# Patient Record
Sex: MALE | Race: WHITE | NOT HISPANIC OR LATINO | Employment: OTHER | URBAN - METROPOLITAN AREA
[De-identification: names, ages, dates, MRNs, and addresses within clinical notes are randomized per-mention and may not be internally consistent; named-entity substitution may affect disease eponyms.]

---
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
## 2012-07-31 LAB — CRC RECOMMENDATION EXT: NORMAL

## 2017-04-07 RX ORDER — PRAVASTATIN SODIUM 20 MG/1
20 TABLET ORAL DAILY
COMMUNITY
End: 2021-01-26

## 2017-04-07 RX ORDER — GABAPENTIN 300 MG/1
300 CAPSULE ORAL 3 TIMES DAILY
COMMUNITY
End: 2021-02-18 | Stop reason: SDUPTHER

## 2017-04-07 RX ORDER — DIAZEPAM 10 MG/1
10 TABLET ORAL 2 TIMES DAILY
COMMUNITY
End: 2021-02-18 | Stop reason: SDUPTHER

## 2017-04-07 RX ORDER — OXYCODONE HYDROCHLORIDE 30 MG/1
30 TABLET ORAL EVERY 6 HOURS PRN
COMMUNITY
End: 2021-03-18 | Stop reason: DRUGHIGH

## 2017-04-07 RX ORDER — DICLOFENAC SODIUM 25 MG/1
75 TABLET, DELAYED RELEASE ORAL 3 TIMES DAILY
COMMUNITY
End: 2021-01-26

## 2017-04-18 PROBLEM — Z72.0 TOBACCO USE: Status: ACTIVE | Noted: 2017-04-18

## 2017-04-18 PROBLEM — E78.00 HYPERCHOLESTEROLEMIA: Status: ACTIVE | Noted: 2017-04-18

## 2017-04-18 PROBLEM — I73.9 PVD (PERIPHERAL VASCULAR DISEASE) WITH CLAUDICATION: Status: ACTIVE | Noted: 2017-04-18

## 2017-04-18 PROBLEM — I10 ESSENTIAL HYPERTENSION: Status: ACTIVE | Noted: 2017-04-18

## 2017-04-19 ENCOUNTER — OFFICE VISIT (OUTPATIENT)
Dept: CARDIOLOGY | Facility: CLINIC | Age: 62
End: 2017-04-19
Payer: MEDICARE

## 2017-04-19 VITALS
BODY MASS INDEX: 35.98 KG/M2 | WEIGHT: 257 LBS | SYSTOLIC BLOOD PRESSURE: 128 MMHG | HEIGHT: 71 IN | DIASTOLIC BLOOD PRESSURE: 72 MMHG | HEART RATE: 80 BPM

## 2017-04-19 DIAGNOSIS — R06.02 SOB (SHORTNESS OF BREATH): Primary | ICD-10-CM

## 2017-04-19 DIAGNOSIS — Z01.810 ENCOUNTER FOR PRE-OPERATIVE CARDIOVASCULAR CLEARANCE: ICD-10-CM

## 2017-04-19 DIAGNOSIS — I10 ESSENTIAL HYPERTENSION: ICD-10-CM

## 2017-04-19 DIAGNOSIS — Z72.0 TOBACCO USE: ICD-10-CM

## 2017-04-19 PROCEDURE — 99214 OFFICE O/P EST MOD 30 MIN: CPT | Mod: S$GLB,,, | Performed by: INTERNAL MEDICINE

## 2017-04-19 PROCEDURE — 93010 ELECTROCARDIOGRAM REPORT: CPT | Mod: ,,, | Performed by: INTERNAL MEDICINE

## 2017-04-19 PROCEDURE — 93005 ELECTROCARDIOGRAM TRACING: CPT | Mod: S$GLB,,, | Performed by: INTERNAL MEDICINE

## 2017-04-19 RX ORDER — ASPIRIN 81 MG/1
81 TABLET ORAL DAILY
Status: ON HOLD | COMMUNITY
End: 2017-06-29 | Stop reason: HOSPADM

## 2017-04-19 RX ORDER — KETOCONAZOLE 20 MG/G
CREAM TOPICAL
Refills: 2 | COMMUNITY
Start: 2017-04-11 | End: 2021-07-02 | Stop reason: SDUPTHER

## 2017-04-19 NOTE — MR AVS SNAPSHOT
[place] [facility]  [address] [facility] [address]  Phone: [phone]                  Lei Hsu   2017 10:40 AM   Office Visit    Description:  Male : 1955   Provider:  Babs Oneal MD   Department:  [place] [facility]           Reason for Visit     Peripheral Arterial Disease     Hypertension     Hyperlipidemia           Diagnoses this Visit        Comments    SOB (shortness of breath)    -  Primary SEDENTARY, SMOKING, WILL CHECK NUCLEAR STRESS    Encounter for pre-operative cardiovascular clearance     STRESS TEST    Essential hypertension     OK WITHOUT MEDS    Tobacco use     COUNSELLING    BMI 35.0-35.9,adult     DIET           To Do List           Goals (5 Years of Data)     None      [facility]     [facility] -  Assistance  Unless otherwise directed by your provider, please contact [facility], our nurse care line that is available for  assistance.     Registered nurses in the [facility] provide: appointment scheduling, clinical advisement, health education, and other advisory services.  Call: [phone] (toll free)               Medications           Message regarding Medications     Verify the changes and/or additions to your medication regime listed below are the same as discussed with your clinician today.  If any of these changes or additions are incorrect, please notify your healthcare provider.             Verify that the below list of medications is an accurate representation of the medications you are currently taking.  If none reported, the list may be blank. If incorrect, please contact your healthcare provider. Carry this list with you in case of emergency.           Current Medications     diazePAM (VALIUM) 10 MG Tab Take 10 mg by mouth once daily.    diclofenac (VOLTAREN) 25 MG TbEC Take 75 mg by mouth 3 (three) times daily.    gabapentin (NEURONTIN) 300 MG capsule Take 300 mg by mouth 3 (three)  "times daily.    oxycodone (ROXICODONE) 30 MG Tab Take 5 mg by mouth every 6 (six) hours as needed.    pravastatin (PRAVACHOL) 20 MG tablet Take 20 mg by mouth once daily.    aspirin (ECOTRIN) 81 MG EC tablet Take 81 mg by mouth once daily.    ketoconazole (NIZORAL) 2 % cream APPLY TO AFFECTED AREA TOPICALLY ONCE DAILY           Clinical Reference Information           Your Vitals Were     BP Pulse Height Weight BMI    128/72 80 5' 11" (1.803 m) 116.6 kg (257 lb) 35.84 kg/m2      Blood Pressure          Most Recent Value    BP  128/72      Allergies as of 4/19/2017     No Known Allergies      Immunizations Administered on Date of Encounter - 4/19/2017     None      [facility] Sign-Up     Activating your [facility] account is as easy as 1-2-3!     1) Visit [email or web address], select Sign Up Now, enter this activation code and your date of birth, then select Next.  [email or web address]  Expires: 6/3/2017 11:46 AM      2) Create a username and password to use when you visit [facility] in the future and select a security question in case you lose your password and select Next.    3) Enter your e-mail address and click Sign Up!    Additional Information  If you have questions, please e-mail [email or web address] or call [phone] to talk to our [facility] staff. Remember, [facility] is NOT to be used for urgent needs. For medical emergencies, dial 911.         Instructions      Risk Factors for Heart Disease  A risk factor is something that increases your chance of having heart disease. Heart disease (also called coronary artery disease) involves damage to the heart arteries. These blood vessels need to work well to provide the oxygen your heart needs to pump blood to the rest of your body. Things like smoking or high cholesterol levels can damage arteries. You cant control some risk factors, such as age and a family history of heart disease. But there are many things you can control to reduce your risk for heart " "disease.    Unhealthy cholesterol levels  Cholesterol is a fat-like substance in your blood. It can build up along the artery walls. This is called plaque. Over time, plaque narrows the arteries and reduces blood flow to your heart or brain. If a blood clot forms or a piece of the plaque breaks off, it can completely block the artery and cause a heart attack or stroke. Your risk of heart disease goes up if you have high levels of LDL ("bad") cholesterol or triglycerides (another fatty substance that can build up). Youre also at risk if you have low HDL cholesterol ("good") cholesterol. HDL helps clear the bad cholesterol away. You're at risk if you have:  HDL cholesterol 50 mg/dL or lower; LDL cholesterol 100 mg/dL; or triglycerides of 150 mg/dL or higher.  Smoking  This is the most important risk factor you can change. Smoking causes inflammation and damages the smooth muscle that lines the arteries making them less flexible. It also raises your blood pressure, causing further damage to the artery lining. Smoking also increases your risk that your blood may clot, block an artery, and cause a heart attack or stroke. Smoking also damages your lungs, which affects the delivery of oxygen to the body. If you smoke, you are 2 to 4 times more likely to develop coronary artery disease. If you smoke, it's never too late to help your heart. Ask your doctor about nicotine replacement products and smoking cessation support.  High blood pressure  High blood pressure occurs when blood pushes too hard against artery walls. This causes damage to the artery walls and the formation of scar tissue as it heals. This makes the arteries stiff and weak. Plaque sticks to the scarred tissue narrowing and hardening the arteries. High blood pressure also causes your heart to work harder to get blood out to the body. High blood pressure raises your risk of heart attack, also known as acute myocardial infarction, or AMI, and especially " stroke. The brain tissue is especially sensitive to high blood pressure damage. You're at risk if your blood pressure is 120/80 or higher.  Negative emotions  Chronic stress, pent-up anger, and other negative emotions have been linked to heart disease. This occurs because stress increases the levels of a hormone that increase the demand on your heart. Over time, these emotions could raise your heart disease risk.  Metabolic syndrome  This is caused by a combination of certain risk factors. It puts you at extra high risk of heart disease, stroke, and diabetes. You have metabolic syndrome if you have 3 or more of the following: low HDL cholesterol; high triglycerides; high blood pressure; high blood sugar; extra weight around the waist.  Diabetes  Diabetes occurs when you have high levels of sugar (glucose) in your blood. This can damage arteries if not kept under control. Having diabetes also makes you more likely to have a silent heart attack--one without any symptoms.  Excess weight  Excess weight makes other risk factors, such as diabetes, more likely. Excess weight around the waist or stomach increases your heart disease risk the most.  Lack of physical activity  When youre not active, youre more likely to develop diabetes, high blood pressure, high cholesterol levels, and excess weight.     Most people with heart disease have more than one risk factor.   Date Last Reviewed: 3/28/2016  © 9853-0293 [employer]. [address] [place] [address], [place] [address]. All rights reserved. This information is not intended as a substitute for professional medical care. Always follow your healthcare professional's instructions.        Established High Blood Pressure    High blood pressure (hypertension) is a chronic disease. Often health care providers dont know what causes it. But it can be caused by certain health conditions and medicines.  If you have high blood pressure, you may not have any symptoms. If you do  have symptoms, they may include headache, dizziness, changes in your vision, chest pain, and shortness of breath. But even without symptoms, high blood pressure thats not treated raises your risk for heart attack and stroke. High blood pressure is a serious health risk and shouldnt be ignored.  A blood pressure reading is made up of two numbers: a higher number over a lower number. The top number is the systolic pressure. The bottom number is the diastolic pressure. A normal blood pressure is less than 120 over less than 80.  High blood pressure is when either the top number is 140 or higher, or the bottom number is 90 or higher. This must be the result when taking your blood pressure a number of times. The blood pressures between normal and high are called prehypertension.  Home care  If you have high blood pressure, you should do what is listed below to lower your blood pressure. If you are taking medicines for high blood pressure, these methods may reduce or end your need for medicines in the future.  · Begin a weight-loss program if you are overweight.  · Cut back on how much salt you get in your diet. Heres how to do this:  ¨ Dont eat foods that have a lot of salt. These include olives, pickles, smoked meats, and salted potato chips.  ¨ Dont add salt to your food at the table.  ¨ Use only small amounts of salt when cooking.  · Begin an exercise program. Talk with your health care provider about the type of exercise program that would be best for you. It doesn't have to be hard. Even brisk walking for 20 minutes 3 times a week is a good form of exercise.  · Dont take medicines that have heart stimulants. This includes many cold and sinus decongestant pills and sprays, as well as diet pills. Check the warnings about hypertension on the label. Stimulants such as amphetamine or cocaine could be lethal for someone with high blood pressure. Never take these.  · Limit how much caffeine you get in your diet.  Switch to caffeine-free products.  · Stop smoking. If you are a long-time smoker, this can be hard. Enroll in a stop-smoking program to make it more likely that you will quit for good.  · Learn how to handle stress. This is an important part of any program to lower blood pressure. Learn about relaxation methods like meditation, yoga, or biofeedback.  · If your provider prescribed medicines, take them exactly as directed. Missing doses may cause your blood pressure get out of control.  · Consider buying an automatic blood pressure machine. You can get one of these at most pharmacies. Use this to watch your blood pressure at home. Give the results to your provider.  Follow-up care  You will need to make regular visits to your health care provider. This is to check your blood pressure and to make changes to your medicines. Make a follow-up appointment as directed.  When to seek medical advice  Call your health care provider right away if any of these occur:  · Chest pain or shortness of breath  · Severe headache  · Throbbing or rushing sound in the ears  · Nosebleed  · Sudden severe pain in your belly (abdomen)  · Extreme drowsiness, confusion, or fainting  · Dizziness or dizziness with a spinning sensation (vertigo)  · Weakness of an arm or leg or one side of the face  · You have problems speaking or seeing   Date Last Reviewed: 11/25/2014  © 2816-1113 [employer]. [address] [place], [place] [id number]. All rights reserved. This information is not intended as a substitute for professional medical care. Always follow your healthcare professional's instructions.        Smoking and Peripheral Arterial Disease (PAD)  Smoking is the greatest single danger to the health of your arteries. It puts you at higher risk for peripheral arterial disease (PAD). PAD is a disease of the arteries in the legs. If you have PAD, its likely that other parts of your body are diseased, too. That puts you at high risk for  heart attack or stroke. Read on to learn how smoking can lead to PAD and affect your health.  How can smoking lead to PAD?  Smoking causes swelling and redness (inflammation) that leads to plaque forming. Plaque is a waxy material made up of cholesterol and other particles. It can build up in your artery walls. When there is too much plaque, your arteries can become narrowed and restrict blood flow. This then raises your risk for PAD and blood clots. It also worsens other risk factors, such as high blood pressure and high cholesterol. These are things that make you more likely to have artery disease.  What happens if you dont quit smoking?  · You have 2 to 4 times the risk of dying from a heart attack or stroke as a nonsmoker.  · You have a greater risk of getting severe PAD, pain in your legs when walking (claudication), dead body tissue due to lack of blood flow (gangrene), or having a leg or foot amputated.  · You are at greater risk for abdominal aortic aneurysm (AAA). This is a bulge in the aorta, a major artery. It can burst suddenly and be fatal.  What happens if you quit smoking?  · Your risk for heart attack and stroke drops as soon as you quit smoking.  · After 1 year of not smoking, your risk for heart attack falls by 50%.  · In 5 to 15 years after you quit, your risk for heart attack or stroke is the same as someone who never smoked.  · Your risk for amputation and other complications of PAD is reduced.  · Your risk of developing AAA decreases.     For more information  · ForceManager[employer].gov/kjlv-qb-ad-expert  · National Cancer [place] Smoking Quitline:2-796-77H-QUIT (2-182-772-6347)      Date Last Reviewed: 6/1/2016  © 1888-8947 [employer]. [address] [place] [address], [place] [address]. All rights reserved. This information is not intended as a substitute for professional medical care. Always follow your healthcare professional's instructions.        How to Quit Smoking  Smoking is one of the  hardest habits to break. About half of all people who have ever smoked have been able to quit. Most people who still smoke want to quit. Here are some of the best ways to stop smoking.    Keep trying  It takes most smokers about eight tries before they can quit entirely. Its important not to give up.  Go cold turkey  Most former smokers quit cold turkey (all at once). Trying to cut back gradually doesn't seem to work as well, perhaps because it continues the smoking habit. Also, it is possible to inhale more while smoking fewer cigarettes. This results in the same amount of nicotine in your body!  Get support  Support programs can be a big help, especially for heavy smokers. These groups offer lectures, ways to change behavior, and peer support. Here are some ways to find a support program:  · Free national quitline: 800-QUIT-NOW (536-928-5964).  · Hospital quit-smoking programs.  · American Lung Association: (426.347.1032).  · American Cancer Society (514-580-0593).  Support at home is important too. Nonsmokers can offer praise and encouragement. If the smoker in your life finds it hard to quit, encourage them to keep trying!  Over-the-counter medicines  Nicotine replacement therapy may make quitting easier. Certain aids, such as the nicotine patch, gum, and lozenges, are available without a prescription. It is best to use these under a doctors care, though. The skin patch provides a steady supply of nicotine. Nicotine gum and lozenges give temporary bursts of low levels of nicotine. Both methods reduce the craving for cigarettes. Warning: If you have nausea, vomiting, dizziness, weakness, or a fast heartbeat, stop using these products and see your doctor.  Prescription medicines  After reviewing your smoking patterns and prior attempts to quit, your doctor may offer a prescription medicine such as bupropion, varenicline, a nicotine inhaler, or nasal spray. Each has advantages and side effects. Your doctor can  "review these with you.  Health benefits of quitting  The benefits of quitting start right away and keep improving the longer you go without smoking. These benefits occur at any age.  So whether you are 17 or 70, quitting is a good decision. Some of the benefits include:  · 20 minutes: Blood pressure and pulse return to normal.  · 8 hours: Oxygen levels return to normal.  · 2 days: Ability to smell and taste begin to improve as damaged nerves regrow.  · 2 to 3 weeks: Circulation and lung function improve.  · 1 to 9 months: Coughing, congestion, and shortness of breath decrease; tiredness decreases.  · 1 year: Risk of heart attack decreases by half.  · 5 years: Risk of lung cancer decreases by half; risk of stroke becomes the same as a nonsmokers.  For more on how to quit smoking, try these online resources:   · [employer].gov  · "Clearing the Air" booklet from the National Cancer [place]: smokefree.gov/sites/default/files/pdf/clearing-the-air-accessible.pdf  Date Last Reviewed: 4/28/2015 © 2000-2016 [employer]. [address] [place] [address]. All rights reserved. This information is not intended as a substitute for professional medical care. Always follow your healthcare professional's instructions.        Weight Management: Getting Started  Healthy bodies come in all shapes and sizes. Not all bodies are made to be thin. For some people, a healthy weight is higher than the average weight listed on weight charts. Your healthcare provider can help you decide on a healthy weight for you.    Reasons to lose weight  Losing weight can help with some health problems, such as high blood pressure, heart disease, diabetes, sleep apnea, and arthritis. You may also feel more energy.  Set your long-term goal  Your goal doesn't even have to be a specific weight. You may decide on a fitness goal (such as being able to walk 10 miles a week), or a health goal (such as lowering your blood pressure). Choose " a goal that is measurable and reasonable, so you know when you've reached it. A goal of reaching a BMI of less than 25 is not always reasonable (or possible).   Make an action plan  Habits dont change overnight. Setting your goals too high can leave you feeling discouraged if you cant reach them. Be realistic. Choose one or two small changes you can make now. Set an action plan for how you are going to make these changes. When you can stick to this plan, keep making a few more small changes. Taking small steps will help you stay on the path to success.  Track your progress  Write down your goals. Then, keep a daily record of your progress. Write down what you eat and how active you are. This record lets you look back on how much youve done. It may also help when youre feeling frustrated. Reward yourself for success. Even if you dont reach every goal, give yourself credit for what you do get done.  Get support  Encouragement from others can help make losing weight easier. Ask your family members and friends for support. They may even want to join you. Also look to your healthcare provider, registered dietitian, and  for help. Your local hospital can give you more information about nutrition, exercise, and weight loss.  Date Last Reviewed: 1/31/2016 © 2000-2016 [employer]. [address] [place] [address], [place] [address]. All rights reserved. This information is not intended as a substitute for professional medical care. Always follow your healthcare professional's instructions.             Smoking Cessation     If you would like to quit smoking:   You may be eligible for free services if you are a [place] resident and started smoking cigarettes before September 1, 1988.  Call the Smoking Cessation Trust (SCT) toll free at (703) 424-2162 or (171) 063-5652.   Call 1-800-QUIT-NOW if you do not meet the above criteria.   Contact us via email: [email or web address]   View our  website for more information: [email or web address]        Language Assistance Services     ATTENTION: Language assistance services are available, free of charge. Please call [phone].      ATENCIÓN: Si habreginald jefferson, tiene a pritchett disposición servicios gratuitos de asistencia lingüística. Llame al [phone].     CHÚ Ý: N?u b?n nói Ti?ng Vi?t, có các d?ch v? h? tr? ngôn ng? mi?n phí dành cho b?n. G?i s? [phone].         [facility] complies with applicable Federal civil rights laws and does not discriminate on the basis of race, color, national origin, age, disability, or sex.

## 2017-04-19 NOTE — PROGRESS NOTES
Subjective:    Patient ID:  Lei Hsu is a 62 y.o. male who presents for Peripheral Arterial Disease (Cardiac clearance right knee replacement surgery); Hypertension; and Hyperlipidemia      HPI  NEW PATIENT EVALUATION , WAS SEEN IN 2013, HAD PVD WITH INTERVENTIONS, NEEDS KNEE SURGERY,HAD STRESS TEST IN 2015 , WAS OK, DR COOK, OFF BP MEDS FOR 2-3 YEARS, BP OK, STOPPED PER DR PAREDES, RECENT LABS PER DR PAREDES PCP, SEE ROS  Past Medical History:   Diagnosis Date    Atherosclerosis     Cardiac murmur     Hyperlipidemia     Hypertension     Tobacco abuse      Past Surgical History:   Procedure Laterality Date    KNEE ARTHROSCOPY      KNEE SURGERY       History reviewed. No pertinent family history.  Social History     Social History    Marital status:      Spouse name: N/A    Number of children: N/A    Years of education: N/A     Social History Main Topics    Smoking status: Current Every Day Smoker     Packs/day: 1.00     Years: 40.00     Types: Cigarettes    Smokeless tobacco: Never Used    Alcohol use No    Drug use: None    Sexual activity: Not Asked     Other Topics Concern    None     Social History Narrative       Review of patient's allergies indicates:  No Known Allergies    Current Outpatient Prescriptions:     diazePAM (VALIUM) 10 MG Tab, Take 10 mg by mouth once daily., Disp: , Rfl:     diclofenac (VOLTAREN) 25 MG TbEC, Take 75 mg by mouth 3 (three) times daily., Disp: , Rfl:     gabapentin (NEURONTIN) 300 MG capsule, Take 300 mg by mouth 3 (three) times daily., Disp: , Rfl:     oxycodone (ROXICODONE) 30 MG Tab, Take 5 mg by mouth every 6 (six) hours as needed., Disp: , Rfl:     pravastatin (PRAVACHOL) 20 MG tablet, Take 20 mg by mouth once daily., Disp: , Rfl:     aspirin (ECOTRIN) 81 MG EC tablet, Take 81 mg by mouth once daily., Disp: , Rfl:     ketoconazole (NIZORAL) 2 % cream, APPLY TO AFFECTED AREA TOPICALLY ONCE DAILY, Disp: , Rfl: 2    Review of Systems  "  Constitution: Negative for chills, decreased appetite, diaphoresis, fever, weakness, malaise/fatigue, night sweats and weight gain.   HENT: Negative for congestion, hoarse voice and nosebleeds.    Eyes: Negative for blurred vision, discharge and visual disturbance.        DRY EYES   Cardiovascular: Negative for chest pain, claudication, cyanosis, irregular heartbeat, leg swelling, near-syncope, orthopnea, palpitations, paroxysmal nocturnal dyspnea and syncope. Dyspnea on exertion: MILD.        NOT ACTIVE/ KNEES   Respiratory: Positive for shortness of breath. Negative for cough, hemoptysis, sleep disturbances due to breathing, sputum production and wheezing.    Endocrine: Negative for cold intolerance, heat intolerance, polydipsia and polyphagia.   Hematologic/Lymphatic: Negative for adenopathy and bleeding problem. Does not bruise/bleed easily.   Skin: Negative for color change, itching and nail changes.   Musculoskeletal: Positive for arthritis, joint pain and stiffness. Negative for falls. Back pain: STILL DESPITE SURGERY/FUSION.   Gastrointestinal: Negative for bloating, abdominal pain, change in bowel habit, constipation, dysphagia, flatus, heartburn, hematemesis, jaundice and melena.   Genitourinary: Negative for dysuria, flank pain, frequency, hematuria and incomplete emptying.   Neurological: Negative for brief paralysis, difficulty with concentration, dizziness, focal weakness, light-headedness, numbness, paresthesias and tremors. Loss of balance: KNEES.   Psychiatric/Behavioral: Negative for altered mental status, depression, hypervigilance and substance abuse. Memory loss: SOME. The patient has insomnia. The patient is not nervous/anxious.    Allergic/Immunologic: Negative for environmental allergies, hives and persistent infections.        Objective:      Vitals:    04/19/17 1112   BP: 128/72   Pulse: 80   Weight: 116.6 kg (257 lb)   Height: 5' 11" (1.803 m)   PainSc: 0-No pain     Body mass index is " 35.84 kg/(m^2).    Physical Exam   Constitutional: He is oriented to person, place, and time. He appears well-developed and well-nourished. He is active.   OVERWEIGHT   HENT:   Head: Normocephalic and atraumatic.   Mouth/Throat: Oropharynx is clear and moist and mucous membranes are normal.   Eyes: Conjunctivae and EOM are normal. Pupils are equal, round, and reactive to light.   Neck: Neck supple. Normal carotid pulses, no hepatojugular reflux and no JVD present. Carotid bruit is not present. No tracheal deviation, no edema and no erythema present. No thyromegaly present.   Cardiovascular: Normal rate and regular rhythm.  Exam reveals no gallop, no distant heart sounds, no friction rub and no midsystolic click.    Murmur heard.   Systolic murmur is present with a grade of 1/6  at the lower left sternal border  Pulses:       Carotid pulses are 2+ on the right side, and 2+ on the left side.       Radial pulses are 2+ on the right side, and 2+ on the left side.        Femoral pulses are 2+ on the right side, and 2+ on the left side with bruit.       Popliteal pulses are 2+ on the right side, and 2+ on the left side.        Dorsalis pedis pulses are 2+ on the right side, and 2+ on the left side.        Posterior tibial pulses are 2+ on the right side, and 2+ on the left side.   Pulmonary/Chest: Effort normal and breath sounds normal. No tachypnea and no bradypnea.   Abdominal: Soft. Bowel sounds are normal. He exhibits no distension and no mass. There is no tenderness.   Musculoskeletal: Normal range of motion. He exhibits no edema.   Lymphadenopathy:     He has no cervical adenopathy.   Neurological: He is alert and oriented to person, place, and time. No cranial nerve deficit. Coordination normal.   Skin: Skin is warm and dry. No erythema.   Psychiatric: He has a normal mood and affect. Judgment and thought content normal.   Slightly slow speech               ..    Chemistry    No results found for: NA, K, CL, CO2,  BUN, CREATININE, GLU No results found for: CALCIUM, ALKPHOS, AST, ALT, BILITOT         ..No results found for: CHOL  No results found for: HDL  No results found for: LDLCALC  No results found for: TRIG  No results found for: CHOLHDL  ..No results found for: WBC, HGB, HCT, MCV, PLT    Test(s) Reviewed  I have reviewed the following in detail:  [] Stress test   [] Angiography   [] Echocardiogram   [] Labs   [x] Other:       Assessment:         ICD-10-CM ICD-9-CM   1. SOB (shortness of breath) R06.02 786.05   2. Encounter for pre-operative cardiovascular clearance Z01.810 V72.81   3. Essential hypertension I10 401.9   4. Tobacco use Z72.0 305.1   5. BMI 35.0-35.9,adult Z68.35 V85.35     Problem List Items Addressed This Visit        Cardiology Problems    Essential hypertension       Other    Tobacco use    BMI 35.0-35.9,adult    SOB (shortness of breath) - Primary    Encounter for pre-operative cardiovascular clearance           Plan:     EKG SR , WNL, will check nuclear stress test in view of risk factors, even thogh sx mostly with smoking sedentary lifestyle, diet, weight loss, watch bp off meds, explained plan to pt/ wife, WILL CALL WITH RESULTS, rtc in 6 mo      SOB (shortness of breath)  Comments:  SEDENTARY, SMOKING, WILL CHECK NUCLEAR STRESS    Encounter for pre-operative cardiovascular clearance  Comments:  STRESS TEST    Essential hypertension  Comments:  OK WITHOUT MEDS    Tobacco use  Comments:  COUNSELLING    BMI 35.0-35.9,adult  Comments:  DIET  RTC Low level/low impact aerobic exercise 5x's/wk. Heart healthy diet and risk factor modification.    See labs and med orders.    Aerobic exercise 5x's/wk. Heart healthy diet and risk factor modification.    See labs and med orders.

## 2017-04-19 NOTE — PATIENT INSTRUCTIONS
"  Risk Factors for Heart Disease  A risk factor is something that increases your chance of having heart disease. Heart disease (also called coronary artery disease) involves damage to the heart arteries. These blood vessels need to work well to provide the oxygen your heart needs to pump blood to the rest of your body. Things like smoking or high cholesterol levels can damage arteries. You cant control some risk factors, such as age and a family history of heart disease. But there are many things you can control to reduce your risk for heart disease.    Unhealthy cholesterol levels  Cholesterol is a fat-like substance in your blood. It can build up along the artery walls. This is called plaque. Over time, plaque narrows the arteries and reduces blood flow to your heart or brain. If a blood clot forms or a piece of the plaque breaks off, it can completely block the artery and cause a heart attack or stroke. Your risk of heart disease goes up if you have high levels of LDL ("bad") cholesterol or triglycerides (another fatty substance that can build up). Youre also at risk if you have low HDL cholesterol ("good") cholesterol. HDL helps clear the bad cholesterol away. You're at risk if you have:  HDL cholesterol 50 mg/dL or lower; LDL cholesterol 100 mg/dL; or triglycerides of 150 mg/dL or higher.  Smoking  This is the most important risk factor you can change. Smoking causes inflammation and damages the smooth muscle that lines the arteries making them less flexible. It also raises your blood pressure, causing further damage to the artery lining. Smoking also increases your risk that your blood may clot, block an artery, and cause a heart attack or stroke. Smoking also damages your lungs, which affects the delivery of oxygen to the body. If you smoke, you are 2 to 4 times more likely to develop coronary artery disease. If you smoke, it's never too late to help your heart. Ask your doctor about nicotine replacement " products and smoking cessation support.  High blood pressure  High blood pressure occurs when blood pushes too hard against artery walls. This causes damage to the artery walls and the formation of scar tissue as it heals. This makes the arteries stiff and weak. Plaque sticks to the scarred tissue narrowing and hardening the arteries. High blood pressure also causes your heart to work harder to get blood out to the body. High blood pressure raises your risk of heart attack, also known as acute myocardial infarction, or AMI, and especially stroke. The brain tissue is especially sensitive to high blood pressure damage. You're at risk if your blood pressure is 120/80 or higher.  Negative emotions  Chronic stress, pent-up anger, and other negative emotions have been linked to heart disease. This occurs because stress increases the levels of a hormone that increase the demand on your heart. Over time, these emotions could raise your heart disease risk.  Metabolic syndrome  This is caused by a combination of certain risk factors. It puts you at extra high risk of heart disease, stroke, and diabetes. You have metabolic syndrome if you have 3 or more of the following: low HDL cholesterol; high triglycerides; high blood pressure; high blood sugar; extra weight around the waist.  Diabetes  Diabetes occurs when you have high levels of sugar (glucose) in your blood. This can damage arteries if not kept under control. Having diabetes also makes you more likely to have a silent heart attack--one without any symptoms.  Excess weight  Excess weight makes other risk factors, such as diabetes, more likely. Excess weight around the waist or stomach increases your heart disease risk the most.  Lack of physical activity  When youre not active, youre more likely to develop diabetes, high blood pressure, high cholesterol levels, and excess weight.     Most people with heart disease have more than one risk factor.   Date Last Reviewed:  3/28/2016  © 6627-2541 [employer]. [address] [place] [address], [place] [address]. All rights reserved. This information is not intended as a substitute for professional medical care. Always follow your healthcare professional's instructions.        Established High Blood Pressure    High blood pressure (hypertension) is a chronic disease. Often health care providers dont know what causes it. But it can be caused by certain health conditions and medicines.  If you have high blood pressure, you may not have any symptoms. If you do have symptoms, they may include headache, dizziness, changes in your vision, chest pain, and shortness of breath. But even without symptoms, high blood pressure thats not treated raises your risk for heart attack and stroke. High blood pressure is a serious health risk and shouldnt be ignored.  A blood pressure reading is made up of two numbers: a higher number over a lower number. The top number is the systolic pressure. The bottom number is the diastolic pressure. A normal blood pressure is less than 120 over less than 80.  High blood pressure is when either the top number is 140 or higher, or the bottom number is 90 or higher. This must be the result when taking your blood pressure a number of times. The blood pressures between normal and high are called prehypertension.  Home care  If you have high blood pressure, you should do what is listed below to lower your blood pressure. If you are taking medicines for high blood pressure, these methods may reduce or end your need for medicines in the future.  · Begin a weight-loss program if you are overweight.  · Cut back on how much salt you get in your diet. Heres how to do this:  ¨ Dont eat foods that have a lot of salt. These include olives, pickles, smoked meats, and salted potato chips.  ¨ Dont add salt to your food at the table.  ¨ Use only small amounts of salt when cooking.  · Begin an exercise program. Talk with your  health care provider about the type of exercise program that would be best for you. It doesn't have to be hard. Even brisk walking for 20 minutes 3 times a week is a good form of exercise.  · Dont take medicines that have heart stimulants. This includes many cold and sinus decongestant pills and sprays, as well as diet pills. Check the warnings about hypertension on the label. Stimulants such as amphetamine or cocaine could be lethal for someone with high blood pressure. Never take these.  · Limit how much caffeine you get in your diet. Switch to caffeine-free products.  · Stop smoking. If you are a long-time smoker, this can be hard. Enroll in a stop-smoking program to make it more likely that you will quit for good.  · Learn how to handle stress. This is an important part of any program to lower blood pressure. Learn about relaxation methods like meditation, yoga, or biofeedback.  · If your provider prescribed medicines, take them exactly as directed. Missing doses may cause your blood pressure get out of control.  · Consider buying an automatic blood pressure machine. You can get one of these at most pharmacies. Use this to watch your blood pressure at home. Give the results to your provider.  Follow-up care  You will need to make regular visits to your health care provider. This is to check your blood pressure and to make changes to your medicines. Make a follow-up appointment as directed.  When to seek medical advice  Call your health care provider right away if any of these occur:  · Chest pain or shortness of breath  · Severe headache  · Throbbing or rushing sound in the ears  · Nosebleed  · Sudden severe pain in your belly (abdomen)  · Extreme drowsiness, confusion, or fainting  · Dizziness or dizziness with a spinning sensation (vertigo)  · Weakness of an arm or leg or one side of the face  · You have problems speaking or seeing   Date Last Reviewed: 11/25/2014  © 7633-1089 [employer] [address]  [place] [address]. All rights reserved. This information is not intended as a substitute for professional medical care. Always follow your healthcare professional's instructions.        Smoking and Peripheral Arterial Disease (PAD)  Smoking is the greatest single danger to the health of your arteries. It puts you at higher risk for peripheral arterial disease (PAD). PAD is a disease of the arteries in the legs. If you have PAD, its likely that other parts of your body are diseased, too. That puts you at high risk for heart attack or stroke. Read on to learn how smoking can lead to PAD and affect your health.  How can smoking lead to PAD?  Smoking causes swelling and redness (inflammation) that leads to plaque forming. Plaque is a waxy material made up of cholesterol and other particles. It can build up in your artery walls. When there is too much plaque, your arteries can become narrowed and restrict blood flow. This then raises your risk for PAD and blood clots. It also worsens other risk factors, such as high blood pressure and high cholesterol. These are things that make you more likely to have artery disease.  What happens if you dont quit smoking?  · You have 2 to 4 times the risk of dying from a heart attack or stroke as a nonsmoker.  · You have a greater risk of getting severe PAD, pain in your legs when walking (claudication), dead body tissue due to lack of blood flow (gangrene), or having a leg or foot amputated.  · You are at greater risk for abdominal aortic aneurysm (AAA). This is a bulge in the aorta, a major artery. It can burst suddenly and be fatal.  What happens if you quit smoking?  · Your risk for heart attack and stroke drops as soon as you quit smoking.  · After 1 year of not smoking, your risk for heart attack falls by 50%.  · In 5 to 15 years after you quit, your risk for heart attack or stroke is the same as someone who never smoked.  · Your risk for amputation and  other complications of PAD is reduced.  · Your risk of developing AAA decreases.     For more information  · Smokefree.gov/lopw-uj-jc-expert  · National Cancer [place] Smoking Quitline:8-753-09G-QUIT (1-423.278.6431)      Date Last Reviewed: 6/1/2016  © 1561-6429 [employer]. [address] [place] [address], [place] [address]. All rights reserved. This information is not intended as a substitute for professional medical care. Always follow your healthcare professional's instructions.        How to Quit Smoking  Smoking is one of the hardest habits to break. About half of all people who have ever smoked have been able to quit. Most people who still smoke want to quit. Here are some of the best ways to stop smoking.    Keep trying  It takes most smokers about eight tries before they can quit entirely. Its important not to give up.  Go cold turkey  Most former smokers quit cold turkey (all at once). Trying to cut back gradually doesn't seem to work as well, perhaps because it continues the smoking habit. Also, it is possible to inhale more while smoking fewer cigarettes. This results in the same amount of nicotine in your body!  Get support  Support programs can be a big help, especially for heavy smokers. These groups offer lectures, ways to change behavior, and peer support. Here are some ways to find a support program:  · Free national quitline: 800-QUIT-NOW (802-382-4644).  · Hospital quit-smoking programs.  · American Lung Association: (837.470.8037).  · American Cancer Society (489-321-8836).  Support at home is important too. Nonsmokers can offer praise and encouragement. If the smoker in your life finds it hard to quit, encourage them to keep trying!  Over-the-counter medicines  Nicotine replacement therapy may make quitting easier. Certain aids, such as the nicotine patch, gum, and lozenges, are available without a prescription. It is best to use these under a doctors care, though. The skin patch  "provides a steady supply of nicotine. Nicotine gum and lozenges give temporary bursts of low levels of nicotine. Both methods reduce the craving for cigarettes. Warning: If you have nausea, vomiting, dizziness, weakness, or a fast heartbeat, stop using these products and see your doctor.  Prescription medicines  After reviewing your smoking patterns and prior attempts to quit, your doctor may offer a prescription medicine such as bupropion, varenicline, a nicotine inhaler, or nasal spray. Each has advantages and side effects. Your doctor can review these with you.  Health benefits of quitting  The benefits of quitting start right away and keep improving the longer you go without smoking. These benefits occur at any age.  So whether you are 17 or 70, quitting is a good decision. Some of the benefits include:  · 20 minutes: Blood pressure and pulse return to normal.  · 8 hours: Oxygen levels return to normal.  · 2 days: Ability to smell and taste begin to improve as damaged nerves regrow.  · 2 to 3 weeks: Circulation and lung function improve.  · 1 to 9 months: Coughing, congestion, and shortness of breath decrease; tiredness decreases.  · 1 year: Risk of heart attack decreases by half.  · 5 years: Risk of lung cancer decreases by half; risk of stroke becomes the same as a nonsmokers.  For more on how to quit smoking, try these online resources:   · Smokefree.gov  · "Clearing the Air" booklet from the National Cancer [place]: smokefree.gov/sites/default/files/pdf/clearing-the-air-accessible.pdf  Date Last Reviewed: 4/28/2015 © 2000-2016 [employer]. [address] [place] [address], [place] [address]. All rights reserved. This information is not intended as a substitute for professional medical care. Always follow your healthcare professional's instructions.        Weight Management: Getting Started  Healthy bodies come in all shapes and sizes. Not all bodies are made to be thin. For some people, a healthy " weight is higher than the average weight listed on weight charts. Your healthcare provider can help you decide on a healthy weight for you.    Reasons to lose weight  Losing weight can help with some health problems, such as high blood pressure, heart disease, diabetes, sleep apnea, and arthritis. You may also feel more energy.  Set your long-term goal  Your goal doesn't even have to be a specific weight. You may decide on a fitness goal (such as being able to walk 10 miles a week), or a health goal (such as lowering your blood pressure). Choose a goal that is measurable and reasonable, so you know when you've reached it. A goal of reaching a BMI of less than 25 is not always reasonable (or possible).   Make an action plan  Habits dont change overnight. Setting your goals too high can leave you feeling discouraged if you cant reach them. Be realistic. Choose one or two small changes you can make now. Set an action plan for how you are going to make these changes. When you can stick to this plan, keep making a few more small changes. Taking small steps will help you stay on the path to success.  Track your progress  Write down your goals. Then, keep a daily record of your progress. Write down what you eat and how active you are. This record lets you look back on how much youve done. It may also help when youre feeling frustrated. Reward yourself for success. Even if you dont reach every goal, give yourself credit for what you do get done.  Get support  Encouragement from others can help make losing weight easier. Ask your family members and friends for support. They may even want to join you. Also look to your healthcare provider, registered dietitian, and  for help. Your local hospital can give you more information about nutrition, exercise, and weight loss.  Date Last Reviewed: 1/31/2016  © 6321-5576 [employer]. [address] [place] [address], [place] [address]. All rights reserved.  This information is not intended as a substitute for professional medical care. Always follow your healthcare professional's instructions.

## 2017-04-24 ENCOUNTER — TELEPHONE (OUTPATIENT)
Dept: CARDIOLOGY | Facility: CLINIC | Age: 62
End: 2017-04-24

## 2017-04-24 NOTE — TELEPHONE ENCOUNTER
----- Message from Irlanda Toledo sent at 4/24/2017 11:08 AM CDT -----  Patient calling in reference to scheduling a stress test.     Please call patient back at [phone].     Thank you.

## 2017-04-28 ENCOUNTER — TELEPHONE (OUTPATIENT)
Dept: CARDIOLOGY | Facility: CLINIC | Age: 62
End: 2017-04-28

## 2017-04-28 NOTE — TELEPHONE ENCOUNTER
----- Message from Patrice Jackman sent at 4/28/2017 12:46 PM CDT -----  Contact: Patient  Patient called requesting appointment for stress test. Please call back at [phone]. Thanks,

## 2017-05-02 ENCOUNTER — TELEPHONE (OUTPATIENT)
Dept: CARDIOLOGY | Facility: CLINIC | Age: 62
End: 2017-05-02

## 2017-05-02 NOTE — TELEPHONE ENCOUNTER
----- Message from Miguel Angel Carvalho sent at 5/2/2017 10:35 AM CDT -----  Contact: pt  Pt needs a cardiac clearance for surgery also needs to know what's going on with the Stress test(pt states he has made several calls and not getting a response)  Call Back#[phone] or   Thanks

## 2017-05-08 ENCOUNTER — TELEPHONE (OUTPATIENT)
Dept: CARDIOLOGY | Facility: CLINIC | Age: 62
End: 2017-05-08

## 2017-05-08 NOTE — TELEPHONE ENCOUNTER
----- Message from Kate Au sent at 5/8/2017  1:29 PM CDT -----  Contact: pt, #[phone]  Clarification on injection/stress Test have on May 10  Call back on # [phone]  thanks

## 2017-05-09 ENCOUNTER — TELEPHONE (OUTPATIENT)
Dept: CARDIOLOGY | Facility: CLINIC | Age: 62
End: 2017-05-09

## 2017-05-09 DIAGNOSIS — Z72.0 TOBACCO USE: ICD-10-CM

## 2017-05-09 DIAGNOSIS — Z01.818 PRE-OP EXAM: ICD-10-CM

## 2017-05-09 DIAGNOSIS — I10 ESSENTIAL HYPERTENSION: ICD-10-CM

## 2017-05-09 DIAGNOSIS — R06.02 SOB (SHORTNESS OF BREATH): Primary | ICD-10-CM

## 2017-05-09 NOTE — TELEPHONE ENCOUNTER
----- Message from Kameron Armando sent at 5/9/2017  3:36 PM CDT -----  Contact: Wife/Brittaney Quispe called in regarding the attached patient and patient would like a call back to go over all of the instructions for his test tomorrow.  Patient especially wants to know about taking his pain medication??    Patient call back number is [phone]

## 2017-05-10 ENCOUNTER — HOSPITAL ENCOUNTER (OUTPATIENT)
Dept: RADIOLOGY | Facility: HOSPITAL | Age: 62
Discharge: HOME | End: 2017-05-10
Attending: FAMILY MEDICINE
Payer: MEDICARE

## 2017-05-10 ENCOUNTER — CLINICAL SUPPORT (OUTPATIENT)
Dept: CARDIOLOGY | Facility: CLINIC | Age: 62
End: 2017-05-10
Payer: MEDICARE

## 2017-05-10 DIAGNOSIS — R06.02 SOB (SHORTNESS OF BREATH): ICD-10-CM

## 2017-05-10 DIAGNOSIS — Z01.818 PRE-OP EXAM: ICD-10-CM

## 2017-05-10 DIAGNOSIS — Z72.0 TOBACCO USE: ICD-10-CM

## 2017-05-10 DIAGNOSIS — Z01.810 ENCOUNTER FOR PRE-OPERATIVE CARDIOVASCULAR CLEARANCE: ICD-10-CM

## 2017-05-10 DIAGNOSIS — I10 ESSENTIAL HYPERTENSION: ICD-10-CM

## 2017-05-10 LAB — DIASTOLIC DYSFUNCTION: NO

## 2017-05-10 PROCEDURE — 93016 CV STRESS TEST SUPVJ ONLY: CPT | Mod: S$PBB,,, | Performed by: INTERNAL MEDICINE

## 2017-05-10 PROCEDURE — 78452 HT MUSCLE IMAGE SPECT MULT: CPT | Mod: 26,,, | Performed by: RADIOLOGY

## 2017-05-10 PROCEDURE — 93017 CV STRESS TEST TRACING ONLY: CPT | Mod: PBBFAC,PO | Performed by: INTERNAL MEDICINE

## 2017-05-10 PROCEDURE — 93018 CV STRESS TEST I&R ONLY: CPT | Mod: S$PBB,,, | Performed by: INTERNAL MEDICINE

## 2017-06-13 ENCOUNTER — TELEPHONE (OUTPATIENT)
Dept: CARDIOLOGY | Facility: CLINIC | Age: 62
End: 2017-06-13

## 2017-06-21 ENCOUNTER — HOSPITAL ENCOUNTER (OUTPATIENT)
Dept: RADIOLOGY | Facility: HOSPITAL | Age: 62
Discharge: HOME | End: 2017-06-21
Attending: ORTHOPAEDIC SURGERY
Payer: MEDICARE

## 2017-06-21 ENCOUNTER — HOSPITAL ENCOUNTER (OUTPATIENT)
Dept: PREADMISSION TESTING | Facility: HOSPITAL | Age: 62
Discharge: HOME | End: 2017-06-21
Attending: ORTHOPAEDIC SURGERY
Payer: MEDICARE

## 2017-06-21 VITALS — HEIGHT: 72 IN | BODY MASS INDEX: 34.54 KG/M2 | WEIGHT: 255 LBS

## 2017-06-21 DIAGNOSIS — M17.11 OSTEOARTHRITIS OF RIGHT KNEE, UNSPECIFIED OSTEOARTHRITIS TYPE: Primary | ICD-10-CM

## 2017-06-21 LAB
ANION GAP SERPL CALC-SCNC: 10 MMOL/L
BACTERIA #/AREA URNS HPF: ABNORMAL /HPF
BASOPHILS # BLD AUTO: 0 K/UL
BASOPHILS NFR BLD: 0.7 %
BILIRUB UR QL STRIP: NEGATIVE
BUN SERPL-MCNC: 25 MG/DL
CALCIUM SERPL-MCNC: 9.3 MG/DL
CHLORIDE SERPL-SCNC: 102 MMOL/L
CLARITY UR: CLEAR
CO2 SERPL-SCNC: 28 MMOL/L
COLOR UR: YELLOW
CREAT SERPL-MCNC: 1.5 MG/DL
DIFFERENTIAL METHOD: ABNORMAL
EOSINOPHIL # BLD AUTO: 0.1 K/UL
EOSINOPHIL NFR BLD: 1.8 %
ERYTHROCYTE [DISTWIDTH] IN BLOOD BY AUTOMATED COUNT: 13.3 %
EST. GFR  (AFRICAN AMERICAN): 57 ML/MIN/1.73 M^2
EST. GFR  (NON AFRICAN AMERICAN): 49 ML/MIN/1.73 M^2
GLUCOSE SERPL-MCNC: 107 MG/DL
GLUCOSE UR QL STRIP: NEGATIVE
HCT VFR BLD AUTO: 44.6 %
HGB BLD-MCNC: 15.2 G/DL
HGB UR QL STRIP: NEGATIVE
HYALINE CASTS #/AREA URNS LPF: 0 /LPF
KETONES UR QL STRIP: NEGATIVE
LEUKOCYTE ESTERASE UR QL STRIP: ABNORMAL
LYMPHOCYTES # BLD AUTO: 2.7 K/UL
LYMPHOCYTES NFR BLD: 40.8 %
MCH RBC QN AUTO: 31.4 PG
MCHC RBC AUTO-ENTMCNC: 34.1 %
MCV RBC AUTO: 92 FL
MICROSCOPIC COMMENT: ABNORMAL
MONOCYTES # BLD AUTO: 0.3 K/UL
MONOCYTES NFR BLD: 5.3 %
NEUTROPHILS # BLD AUTO: 3.3 K/UL
NEUTROPHILS NFR BLD: 51.4 %
NITRITE UR QL STRIP: NEGATIVE
OTHER ELEMENTS URNS MICRO: ABNORMAL
PH UR STRIP: 6 [PH] (ref 5–8)
PLATELET # BLD AUTO: 136 K/UL
PMV BLD AUTO: 8.2 FL
POTASSIUM SERPL-SCNC: 4.6 MMOL/L
PROT UR QL STRIP: ABNORMAL
RBC # BLD AUTO: 4.84 M/UL
RBC #/AREA URNS HPF: 2 /HPF (ref 0–4)
SODIUM SERPL-SCNC: 140 MMOL/L
SP GR UR STRIP: 1.02 (ref 1–1.03)
URN SPEC COLLECT METH UR: ABNORMAL
UROBILINOGEN UR STRIP-ACNC: ABNORMAL EU/DL
WBC # BLD AUTO: 6.5 K/UL
WBC #/AREA URNS HPF: 10 /HPF (ref 0–5)

## 2017-06-21 PROCEDURE — 81000 URINALYSIS NONAUTO W/SCOPE: CPT

## 2017-06-21 PROCEDURE — 99900104 DSU ONLY-NO CHARGE-EA ADD'L HR (STAT)

## 2017-06-21 PROCEDURE — 99900103 DSU ONLY-NO CHARGE-INITIAL HR (STAT)

## 2017-06-21 PROCEDURE — 93005 ELECTROCARDIOGRAM TRACING: CPT

## 2017-06-21 PROCEDURE — 87081 CULTURE SCREEN ONLY: CPT

## 2017-06-21 PROCEDURE — 36415 COLL VENOUS BLD VENIPUNCTURE: CPT

## 2017-06-21 PROCEDURE — 71020 XR CHEST PA AND LATERAL PRE-OP: CPT | Mod: TC

## 2017-06-21 PROCEDURE — 85025 COMPLETE CBC W/AUTO DIFF WBC: CPT

## 2017-06-21 PROCEDURE — 71020 XR CHEST PA AND LATERAL PRE-OP: CPT | Mod: 26,,, | Performed by: RADIOLOGY

## 2017-06-21 PROCEDURE — 80048 BASIC METABOLIC PNL TOTAL CA: CPT

## 2017-06-21 PROCEDURE — 93010 ELECTROCARDIOGRAM REPORT: CPT | Mod: ,,, | Performed by: INTERNAL MEDICINE

## 2017-06-23 LAB — MRSA SPEC QL CULT: NORMAL

## 2017-06-26 ENCOUNTER — ANESTHESIA EVENT (OUTPATIENT)
Dept: SURGERY | Facility: HOSPITAL | Age: 62
DRG: 470 | End: 2017-06-26
Payer: MEDICARE

## 2017-06-27 ENCOUNTER — ANESTHESIA (OUTPATIENT)
Dept: SURGERY | Facility: HOSPITAL | Age: 62
DRG: 470 | End: 2017-06-27
Payer: MEDICARE

## 2017-06-27 ENCOUNTER — HOSPITAL ENCOUNTER (INPATIENT)
Facility: HOSPITAL | Age: 62
LOS: 2 days | Discharge: HOME HEALTH | DRG: 470 | End: 2017-06-29
Attending: ORTHOPAEDIC SURGERY | Admitting: INTERNAL MEDICINE
Payer: MEDICARE

## 2017-06-27 ENCOUNTER — SURGERY (OUTPATIENT)
Age: 62
End: 2017-06-27

## 2017-06-27 DIAGNOSIS — E78.00 HYPERCHOLESTEROLEMIA: ICD-10-CM

## 2017-06-27 DIAGNOSIS — I10 ESSENTIAL HYPERTENSION: ICD-10-CM

## 2017-06-27 DIAGNOSIS — Z96.651 STATUS POST TOTAL RIGHT KNEE REPLACEMENT: ICD-10-CM

## 2017-06-27 DIAGNOSIS — M17.11 OSTEOARTHRITIS OF RIGHT KNEE, UNSPECIFIED OSTEOARTHRITIS TYPE: Primary | ICD-10-CM

## 2017-06-27 DIAGNOSIS — Z72.0 TOBACCO USE: ICD-10-CM

## 2017-06-27 PROCEDURE — 76942 ECHO GUIDE FOR BIOPSY: CPT | Performed by: ANESTHESIOLOGY

## 2017-06-27 PROCEDURE — 71000033 HC RECOVERY, INTIAL HOUR: Performed by: ORTHOPAEDIC SURGERY

## 2017-06-27 PROCEDURE — 63600175 PHARM REV CODE 636 W HCPCS: Performed by: ANESTHESIOLOGY

## 2017-06-27 PROCEDURE — C1713 ANCHOR/SCREW BN/BN,TIS/BN: HCPCS | Performed by: ORTHOPAEDIC SURGERY

## 2017-06-27 PROCEDURE — C1729 CATH, DRAINAGE: HCPCS | Performed by: ORTHOPAEDIC SURGERY

## 2017-06-27 PROCEDURE — 99900104 DSU ONLY-NO CHARGE-EA ADD'L HR (STAT): Performed by: ORTHOPAEDIC SURGERY

## 2017-06-27 PROCEDURE — 25000003 PHARM REV CODE 250: Performed by: ANESTHESIOLOGY

## 2017-06-27 PROCEDURE — 63600175 PHARM REV CODE 636 W HCPCS: Performed by: NURSE ANESTHETIST, CERTIFIED REGISTERED

## 2017-06-27 PROCEDURE — 25000003 PHARM REV CODE 250: Performed by: NURSE ANESTHETIST, CERTIFIED REGISTERED

## 2017-06-27 PROCEDURE — 27200664 HC NERVE BLOCK COMPLETE KIT: Performed by: ANESTHESIOLOGY

## 2017-06-27 PROCEDURE — 37000008 HC ANESTHESIA 1ST 15 MINUTES: Performed by: ORTHOPAEDIC SURGERY

## 2017-06-27 PROCEDURE — 25000003 PHARM REV CODE 250: Performed by: ORTHOPAEDIC SURGERY

## 2017-06-27 PROCEDURE — 25000003 PHARM REV CODE 250: Performed by: INTERNAL MEDICINE

## 2017-06-27 PROCEDURE — C1776 JOINT DEVICE (IMPLANTABLE): HCPCS | Performed by: ORTHOPAEDIC SURGERY

## 2017-06-27 PROCEDURE — 63600175 PHARM REV CODE 636 W HCPCS: Performed by: ORTHOPAEDIC SURGERY

## 2017-06-27 PROCEDURE — 27200688 HC TRAY, SPINAL-HYPER/ ISOBARIC: Performed by: NURSE ANESTHETIST, CERTIFIED REGISTERED

## 2017-06-27 PROCEDURE — 36000710: Performed by: ORTHOPAEDIC SURGERY

## 2017-06-27 PROCEDURE — D9220A PRA ANESTHESIA: Mod: ANES,,, | Performed by: ANESTHESIOLOGY

## 2017-06-27 PROCEDURE — 97116 GAIT TRAINING THERAPY: CPT

## 2017-06-27 PROCEDURE — 27200750 HC INSULATED NEEDLE/ STIMUPLEX: Performed by: ANESTHESIOLOGY

## 2017-06-27 PROCEDURE — C2626 INFUSION PUMP, NON-PROG,TEMP: HCPCS | Performed by: ANESTHESIOLOGY

## 2017-06-27 PROCEDURE — 11000001 HC ACUTE MED/SURG PRIVATE ROOM

## 2017-06-27 PROCEDURE — 36000711: Performed by: ORTHOPAEDIC SURGERY

## 2017-06-27 PROCEDURE — 94761 N-INVAS EAR/PLS OXIMETRY MLT: CPT

## 2017-06-27 PROCEDURE — 27201423 OPTIME MED/SURG SUP & DEVICES STERILE SUPPLY: Performed by: ORTHOPAEDIC SURGERY

## 2017-06-27 PROCEDURE — 27000221 HC OXYGEN, UP TO 24 HOURS

## 2017-06-27 PROCEDURE — 37000009 HC ANESTHESIA EA ADD 15 MINS: Performed by: ORTHOPAEDIC SURGERY

## 2017-06-27 PROCEDURE — 99222 1ST HOSP IP/OBS MODERATE 55: CPT | Mod: ,,, | Performed by: INTERNAL MEDICINE

## 2017-06-27 PROCEDURE — 3E0T3CZ INTRODUCE REGIONAL ANESTH IN PERIPH NRV, PLEXI, PERC: ICD-10-PCS | Performed by: ANESTHESIOLOGY

## 2017-06-27 PROCEDURE — 99900103 DSU ONLY-NO CHARGE-INITIAL HR (STAT): Performed by: ORTHOPAEDIC SURGERY

## 2017-06-27 PROCEDURE — 71000039 HC RECOVERY, EACH ADD'L HOUR: Performed by: ORTHOPAEDIC SURGERY

## 2017-06-27 PROCEDURE — 97162 PT EVAL MOD COMPLEX 30 MIN: CPT

## 2017-06-27 PROCEDURE — 64448 NJX AA&/STRD FEM NRV NFS IMG: CPT | Mod: 59,RT,, | Performed by: ANESTHESIOLOGY

## 2017-06-27 PROCEDURE — 63600175 PHARM REV CODE 636 W HCPCS

## 2017-06-27 PROCEDURE — 64447 NJX AA&/STRD FEMORAL NRV IMG: CPT | Mod: 59,RT,, | Performed by: ANESTHESIOLOGY

## 2017-06-27 PROCEDURE — 0SRC0J9 REPLACEMENT OF RIGHT KNEE JOINT WITH SYNTHETIC SUBSTITUTE, CEMENTED, OPEN APPROACH: ICD-10-PCS | Performed by: ORTHOPAEDIC SURGERY

## 2017-06-27 PROCEDURE — 76942 ECHO GUIDE FOR BIOPSY: CPT | Mod: 26,,, | Performed by: ANESTHESIOLOGY

## 2017-06-27 PROCEDURE — D9220A PRA ANESTHESIA: Mod: CRNA,,, | Performed by: NURSE ANESTHETIST, CERTIFIED REGISTERED

## 2017-06-27 PROCEDURE — 97110 THERAPEUTIC EXERCISES: CPT

## 2017-06-27 DEVICE — PATELLA TRI 38X11 X3 POLYETHYL: Type: IMPLANTABLE DEVICE | Site: KNEE | Status: FUNCTIONAL

## 2017-06-27 DEVICE — CEMENT BONE W/TOBRAMYCIN: Type: IMPLANTABLE DEVICE | Site: KNEE | Status: FUNCTIONAL

## 2017-06-27 DEVICE — IMPLANTABLE DEVICE: Type: IMPLANTABLE DEVICE | Site: KNEE | Status: FUNCTIONAL

## 2017-06-27 DEVICE — BASEPLATE TIB CEM PRIM SZ 7: Type: IMPLANTABLE DEVICE | Site: KNEE | Status: FUNCTIONAL

## 2017-06-27 RX ORDER — GABAPENTIN 300 MG/1
300 CAPSULE ORAL 3 TIMES DAILY
Status: DISCONTINUED | OUTPATIENT
Start: 2017-06-27 | End: 2017-06-29 | Stop reason: HOSPADM

## 2017-06-27 RX ORDER — BUPIVACAINE HYDROCHLORIDE AND EPINEPHRINE 5; 5 MG/ML; UG/ML
INJECTION, SOLUTION EPIDURAL; INTRACAUDAL; PERINEURAL
Status: DISCONTINUED | OUTPATIENT
Start: 2017-06-27 | End: 2017-06-27 | Stop reason: HOSPADM

## 2017-06-27 RX ORDER — MIDAZOLAM HYDROCHLORIDE 1 MG/ML
INJECTION, SOLUTION INTRAMUSCULAR; INTRAVENOUS
Status: DISCONTINUED | OUTPATIENT
Start: 2017-06-27 | End: 2017-06-27

## 2017-06-27 RX ORDER — KETOROLAC TROMETHAMINE 30 MG/ML
INJECTION, SOLUTION INTRAMUSCULAR; INTRAVENOUS
Status: DISCONTINUED | OUTPATIENT
Start: 2017-06-27 | End: 2017-06-27 | Stop reason: HOSPADM

## 2017-06-27 RX ORDER — ACETAMINOPHEN 10 MG/ML
1000 INJECTION, SOLUTION INTRAVENOUS ONCE
Status: COMPLETED | OUTPATIENT
Start: 2017-06-27 | End: 2017-06-27

## 2017-06-27 RX ORDER — LIDOCAINE HCL/PF 100 MG/5ML
SYRINGE (ML) INTRAVENOUS
Status: DISCONTINUED | OUTPATIENT
Start: 2017-06-27 | End: 2017-06-27

## 2017-06-27 RX ORDER — AMOXICILLIN 250 MG
1 CAPSULE ORAL 2 TIMES DAILY
Status: DISCONTINUED | OUTPATIENT
Start: 2017-06-27 | End: 2017-06-29 | Stop reason: HOSPADM

## 2017-06-27 RX ORDER — OXYCODONE HCL 10 MG/1
30 TABLET, FILM COATED, EXTENDED RELEASE ORAL EVERY 12 HOURS
Status: DISCONTINUED | OUTPATIENT
Start: 2017-06-27 | End: 2017-06-27

## 2017-06-27 RX ORDER — OXYCODONE HYDROCHLORIDE 5 MG/1
10 TABLET ORAL
Status: DISCONTINUED | OUTPATIENT
Start: 2017-06-27 | End: 2017-06-27

## 2017-06-27 RX ORDER — ACETAMINOPHEN 500 MG
1000 TABLET ORAL EVERY 6 HOURS
Status: COMPLETED | OUTPATIENT
Start: 2017-06-28 | End: 2017-06-29

## 2017-06-27 RX ORDER — OXYCODONE HYDROCHLORIDE 5 MG/1
5 TABLET ORAL
Status: DISCONTINUED | OUTPATIENT
Start: 2017-06-27 | End: 2017-06-27

## 2017-06-27 RX ORDER — SODIUM CHLORIDE, SODIUM LACTATE, POTASSIUM CHLORIDE, CALCIUM CHLORIDE 600; 310; 30; 20 MG/100ML; MG/100ML; MG/100ML; MG/100ML
10 INJECTION, SOLUTION INTRAVENOUS CONTINUOUS
Status: DISCONTINUED | OUTPATIENT
Start: 2017-06-28 | End: 2017-06-27

## 2017-06-27 RX ORDER — ACETAMINOPHEN 325 MG/1
650 TABLET ORAL EVERY 6 HOURS
Status: DISCONTINUED | OUTPATIENT
Start: 2017-07-01 | End: 2017-06-29 | Stop reason: HOSPADM

## 2017-06-27 RX ORDER — GABAPENTIN 300 MG/1
600 CAPSULE ORAL 3 TIMES DAILY
Status: DISCONTINUED | OUTPATIENT
Start: 2017-06-27 | End: 2017-06-27

## 2017-06-27 RX ORDER — ONDANSETRON 2 MG/ML
4 INJECTION INTRAMUSCULAR; INTRAVENOUS DAILY PRN
Status: DISCONTINUED | OUTPATIENT
Start: 2017-06-27 | End: 2017-06-27

## 2017-06-27 RX ORDER — METHOCARBAMOL 500 MG/1
500 TABLET, FILM COATED ORAL 3 TIMES DAILY
Status: DISCONTINUED | OUTPATIENT
Start: 2017-06-27 | End: 2017-06-29 | Stop reason: HOSPADM

## 2017-06-27 RX ORDER — FENTANYL CITRATE 50 UG/ML
INJECTION, SOLUTION INTRAMUSCULAR; INTRAVENOUS
Status: DISCONTINUED | OUTPATIENT
Start: 2017-06-27 | End: 2017-06-27

## 2017-06-27 RX ORDER — ROPIVACAINE HYDROCHLORIDE 2 MG/ML
INJECTION, SOLUTION EPIDURAL; INFILTRATION; PERINEURAL
Status: DISCONTINUED | OUTPATIENT
Start: 2017-06-27 | End: 2017-06-27

## 2017-06-27 RX ORDER — GABAPENTIN 400 MG/1
800 CAPSULE ORAL 3 TIMES DAILY
Status: DISCONTINUED | OUTPATIENT
Start: 2017-06-27 | End: 2017-06-27

## 2017-06-27 RX ORDER — MELOXICAM 7.5 MG/1
7.5 TABLET ORAL DAILY
Status: DISCONTINUED | OUTPATIENT
Start: 2017-06-27 | End: 2017-06-27

## 2017-06-27 RX ORDER — MUPIROCIN 20 MG/G
1 OINTMENT TOPICAL 2 TIMES DAILY
Status: DISCONTINUED | OUTPATIENT
Start: 2017-06-27 | End: 2017-06-29 | Stop reason: HOSPADM

## 2017-06-27 RX ORDER — PROPOFOL 10 MG/ML
VIAL (ML) INTRAVENOUS
Status: DISCONTINUED | OUTPATIENT
Start: 2017-06-27 | End: 2017-06-27

## 2017-06-27 RX ORDER — SODIUM CHLORIDE, SODIUM LACTATE, POTASSIUM CHLORIDE, CALCIUM CHLORIDE 600; 310; 30; 20 MG/100ML; MG/100ML; MG/100ML; MG/100ML
INJECTION, SOLUTION INTRAVENOUS CONTINUOUS
Status: DISCONTINUED | OUTPATIENT
Start: 2017-06-27 | End: 2017-06-27

## 2017-06-27 RX ORDER — SODIUM CHLORIDE 450 MG/100ML
INJECTION, SOLUTION INTRAVENOUS CONTINUOUS
Status: DISCONTINUED | OUTPATIENT
Start: 2017-06-27 | End: 2017-06-29

## 2017-06-27 RX ORDER — ASPIRIN 325 MG
325 TABLET ORAL 2 TIMES DAILY
Status: DISCONTINUED | OUTPATIENT
Start: 2017-06-28 | End: 2017-06-27

## 2017-06-27 RX ORDER — OXYCODONE HYDROCHLORIDE 5 MG/1
15 TABLET ORAL
Status: DISCONTINUED | OUTPATIENT
Start: 2017-06-27 | End: 2017-06-27

## 2017-06-27 RX ORDER — ASPIRIN 325 MG
325 TABLET ORAL 2 TIMES DAILY
Status: DISCONTINUED | OUTPATIENT
Start: 2017-06-27 | End: 2017-06-29 | Stop reason: HOSPADM

## 2017-06-27 RX ORDER — ONDANSETRON HYDROCHLORIDE 2 MG/ML
INJECTION, SOLUTION INTRAMUSCULAR; INTRAVENOUS
Status: DISCONTINUED | OUTPATIENT
Start: 2017-06-27 | End: 2017-06-27

## 2017-06-27 RX ORDER — ONDANSETRON 2 MG/ML
4 INJECTION INTRAMUSCULAR; INTRAVENOUS EVERY 12 HOURS PRN
Status: DISCONTINUED | OUTPATIENT
Start: 2017-06-27 | End: 2017-06-29 | Stop reason: HOSPADM

## 2017-06-27 RX ORDER — TRANEXAMIC ACID 100 MG/ML
INJECTION, SOLUTION INTRAVENOUS
Status: DISCONTINUED | OUTPATIENT
Start: 2017-06-27 | End: 2017-06-27

## 2017-06-27 RX ORDER — KETAMINE HYDROCHLORIDE 10 MG/ML
INJECTION, SOLUTION INTRAMUSCULAR; INTRAVENOUS
Status: DISCONTINUED | OUTPATIENT
Start: 2017-06-27 | End: 2017-06-27

## 2017-06-27 RX ORDER — HYDROMORPHONE HYDROCHLORIDE 2 MG/ML
0.5 INJECTION, SOLUTION INTRAMUSCULAR; INTRAVENOUS; SUBCUTANEOUS
Status: DISCONTINUED | OUTPATIENT
Start: 2017-06-27 | End: 2017-06-29 | Stop reason: HOSPADM

## 2017-06-27 RX ORDER — PRAVASTATIN SODIUM 10 MG/1
20 TABLET ORAL DAILY
Status: DISCONTINUED | OUTPATIENT
Start: 2017-06-27 | End: 2017-06-29 | Stop reason: HOSPADM

## 2017-06-27 RX ORDER — METHOCARBAMOL 500 MG/1
500 TABLET, FILM COATED ORAL 3 TIMES DAILY
Status: DISCONTINUED | OUTPATIENT
Start: 2017-06-27 | End: 2017-06-27

## 2017-06-27 RX ORDER — CEFAZOLIN SODIUM 2 G/50ML
2 SOLUTION INTRAVENOUS ONCE
Status: COMPLETED | OUTPATIENT
Start: 2017-06-27 | End: 2017-06-27

## 2017-06-27 RX ORDER — LIDOCAINE HYDROCHLORIDE 10 MG/ML
1 INJECTION, SOLUTION EPIDURAL; INFILTRATION; INTRACAUDAL; PERINEURAL
Status: DISCONTINUED | OUTPATIENT
Start: 2017-06-27 | End: 2017-06-27

## 2017-06-27 RX ORDER — SODIUM CHLORIDE 0.9 % (FLUSH) 0.9 %
3 SYRINGE (ML) INJECTION
Status: DISCONTINUED | OUTPATIENT
Start: 2017-06-27 | End: 2017-06-27

## 2017-06-27 RX ORDER — DIPHENHYDRAMINE HYDROCHLORIDE 50 MG/ML
12.5 INJECTION INTRAMUSCULAR; INTRAVENOUS
Status: DISCONTINUED | OUTPATIENT
Start: 2017-06-27 | End: 2017-06-29 | Stop reason: HOSPADM

## 2017-06-27 RX ORDER — BUPIVACAINE HYDROCHLORIDE 2.5 MG/ML
INJECTION, SOLUTION EPIDURAL; INFILTRATION; INTRACAUDAL
Status: DISCONTINUED | OUTPATIENT
Start: 2017-06-27 | End: 2017-06-27

## 2017-06-27 RX ORDER — ACETAMINOPHEN 10 MG/ML
INJECTION, SOLUTION INTRAVENOUS
Status: DISCONTINUED | OUTPATIENT
Start: 2017-06-27 | End: 2017-06-27

## 2017-06-27 RX ADMIN — BUPIVACAINE HYDROCHLORIDE 20 ML: 2.5 INJECTION, SOLUTION EPIDURAL; INFILTRATION; INTRACAUDAL; PERINEURAL at 07:06

## 2017-06-27 RX ADMIN — Medication 1000 MG: at 10:06

## 2017-06-27 RX ADMIN — MIDAZOLAM 2 MG: 1 INJECTION INTRAMUSCULAR; INTRAVENOUS at 07:06

## 2017-06-27 RX ADMIN — STANDARDIZED SENNA CONCENTRATE AND DOCUSATE SODIUM 1 TABLET: 8.6; 5 TABLET, FILM COATED ORAL at 09:06

## 2017-06-27 RX ADMIN — KETAMINE HYDROCHLORIDE 20 MG: 10 INJECTION, SOLUTION INTRAMUSCULAR; INTRAVENOUS at 08:06

## 2017-06-27 RX ADMIN — GABAPENTIN 300 MG: 300 CAPSULE ORAL at 09:06

## 2017-06-27 RX ADMIN — METHOCARBAMOL 500 MG: 500 TABLET ORAL at 04:06

## 2017-06-27 RX ADMIN — LIDOCAINE HYDROCHLORIDE 50 MG: 20 INJECTION, SOLUTION INTRAVENOUS at 08:06

## 2017-06-27 RX ADMIN — ASPIRIN 325 MG ORAL TABLET 325 MG: 325 PILL ORAL at 09:06

## 2017-06-27 RX ADMIN — CEFAZOLIN SODIUM 2 G: 2 SOLUTION INTRAVENOUS at 08:06

## 2017-06-27 RX ADMIN — METHOCARBAMOL 500 MG: 500 TABLET ORAL at 09:06

## 2017-06-27 RX ADMIN — ACETAMINOPHEN 1000 MG: 10 INJECTION, SOLUTION INTRAVENOUS at 03:06

## 2017-06-27 RX ADMIN — GABAPENTIN 600 MG: 300 CAPSULE ORAL at 11:06

## 2017-06-27 RX ADMIN — PROPOFOL 10 MG: 10 INJECTION, EMULSION INTRAVENOUS at 08:06

## 2017-06-27 RX ADMIN — OXYCODONE HYDROCHLORIDE 30 MG: 10 TABLET, FILM COATED, EXTENDED RELEASE ORAL at 11:06

## 2017-06-27 RX ADMIN — ONDANSETRON 4 MG: 2 INJECTION, SOLUTION INTRAMUSCULAR; INTRAVENOUS at 07:06

## 2017-06-27 RX ADMIN — BUPIVACAINE HYDROCHLORIDE AND EPINEPHRINE BITARTRATE 30 ML: 5; .0091 INJECTION, SOLUTION EPIDURAL; INTRACAUDAL; PERINEURAL at 07:06

## 2017-06-27 RX ADMIN — MELOXICAM 7.5 MG: 7.5 TABLET ORAL at 11:06

## 2017-06-27 RX ADMIN — STANDARDIZED SENNA CONCENTRATE AND DOCUSATE SODIUM 1 TABLET: 8.6; 5 TABLET, FILM COATED ORAL at 01:06

## 2017-06-27 RX ADMIN — FENTANYL CITRATE 100 MCG: 50 INJECTION INTRAMUSCULAR; INTRAVENOUS at 07:06

## 2017-06-27 RX ADMIN — KETOROLAC TROMETHAMINE 30 MG: 30 INJECTION, SOLUTION INTRAMUSCULAR; INTRAVENOUS at 07:06

## 2017-06-27 RX ADMIN — PRAVASTATIN SODIUM 20 MG: 10 TABLET ORAL at 01:06

## 2017-06-27 RX ADMIN — TRANEXAMIC ACID 1000 MG: 100 INJECTION, SOLUTION INTRAVENOUS at 08:06

## 2017-06-27 RX ADMIN — ACETAMINOPHEN 1000 MG: 10 INJECTION, SOLUTION INTRAVENOUS at 08:06

## 2017-06-27 RX ADMIN — ROPIVACAINE HYDROCHLORIDE 20 ML: 2 INJECTION, SOLUTION EPIDURAL; INFILTRATION at 07:06

## 2017-06-27 RX ADMIN — SODIUM CHLORIDE: 0.45 INJECTION, SOLUTION INTRAVENOUS at 01:06

## 2017-06-27 RX ADMIN — HYDROMORPHONE HYDROCHLORIDE 0.5 MG: 2 INJECTION INTRAMUSCULAR; INTRAVENOUS; SUBCUTANEOUS at 05:06

## 2017-06-27 RX ADMIN — HYDROMORPHONE HYDROCHLORIDE 0.5 MG: 2 INJECTION INTRAMUSCULAR; INTRAVENOUS; SUBCUTANEOUS at 01:06

## 2017-06-27 RX ADMIN — SODIUM CHLORIDE, SODIUM LACTATE, POTASSIUM CHLORIDE, AND CALCIUM CHLORIDE: .6; .31; .03; .02 INJECTION, SOLUTION INTRAVENOUS at 06:06

## 2017-06-27 RX ADMIN — GABAPENTIN 300 MG: 300 CAPSULE ORAL at 01:06

## 2017-06-27 RX ADMIN — MUPIROCIN 1 G: 20 OINTMENT TOPICAL at 09:06

## 2017-06-27 RX ADMIN — ROPIVACAINE HYDROCHLORIDE: 2 INJECTION, SOLUTION EPIDURAL; INFILTRATION at 10:06

## 2017-06-27 RX ADMIN — PROPOFOL 20 MG: 10 INJECTION, EMULSION INTRAVENOUS at 09:06

## 2017-06-27 RX ADMIN — SODIUM CHLORIDE, SODIUM LACTATE, POTASSIUM CHLORIDE, AND CALCIUM CHLORIDE: .6; .31; .03; .02 INJECTION, SOLUTION INTRAVENOUS at 08:06

## 2017-06-27 RX ADMIN — MIDAZOLAM 2 MG: 1 INJECTION INTRAMUSCULAR; INTRAVENOUS at 08:06

## 2017-06-27 RX ADMIN — LIDOCAINE HYDROCHLORIDE 10 MG: 10 INJECTION, SOLUTION EPIDURAL; INFILTRATION; INTRACAUDAL; PERINEURAL at 06:06

## 2017-06-27 NOTE — TRANSFER OF CARE
"Anesthesia Transfer of Care Note    Patient: Lei Hsu    Procedure(s) Performed: Procedure(s) (LRB):  ARTHROPLASTY-KNEE (Right)    Patient location: PACU    Anesthesia Type: regional, MAC and spinal    Transport from OR: Transported from OR on 2-3 L/min O2 by NC with adequate spontaneous ventilation    Post pain: adequate analgesia    Post assessment: no apparent anesthetic complications and tolerated procedure well    Post vital signs: stable    Level of consciousness: awake, alert and oriented    Nausea/Vomiting: no nausea/vomiting    Complications: none    Transfer of care protocol was followed      Last vitals:   Visit Vitals  /77 (BP Location: Left arm, Patient Position: Lying, BP Method: Automatic)   Pulse 67   Temp 36.5 °C (97.7 °F) (Other (see comments))   Resp 20   Ht 5' 11.5" (1.816 m)   Wt 115.7 kg (255 lb)   SpO2 (!) 94%   BMI 35.07 kg/m²     "

## 2017-06-27 NOTE — PLAN OF CARE
Call to Dr Gutierrez  Pt able to move both ankles and lift the left leg off the med and bend to knee  He is able to lift hip level on the right leg  Pt sandy to maintain airway  Pt meets criteria to tranfers to the floor  Pt is released albertina

## 2017-06-27 NOTE — ANESTHESIA PROCEDURE NOTES
Spinal    Diagnosis: right total knee   Patient location during procedure: OR  Start time: 6/27/2017 7:35 AM  Timeout: 6/27/2017 7:35 AM  End time: 6/27/2017 7:48 AM  Staffing  Anesthesiologist: TANA CLARK II  Performed: anesthesiologist   Preanesthetic Checklist  Completed: patient identified, site marked, surgical consent, pre-op evaluation, timeout performed, IV checked, risks and benefits discussed and monitors and equipment checked  Spinal Block  Patient position: sitting  Prep: ChloraPrep  Patient monitoring: heart rate  Approach: midline  Location: L3-4  Injection technique: single shot  CSF Fluid: clear free-flowing CSF  Needle  Needle type: Maynor   Needle gauge: 25 G  Needle length: 3.5 in  Additional Documentation: incremental injection, negative aspiration for heme and no paresthesia on injection  Needle localization: anatomical landmarks  Assessment  Sensory level: T8   Dermatomal levels determined by pinch or prick  Ease of block: easy  Patient's tolerance of the procedure: comfortable throughout block and no complaints  Medications:  Bolus administered: 1.6 mL of 0.75 and with dextrose bupivacaine  Additional Notes  Attempted at L4/L5, unable to locate interspace 2/2 hypertrophy, reattempted at L2/3, csf obtained

## 2017-06-27 NOTE — BRIEF OP NOTE
[facility]  Brief Operative Note    SUMMARY     Surgery Date: 6/27/2017     Surgeon(s) and Role:     * Lei Almonte Jr., MD - Primary    Assisting Surgeon: Fabiana schumacher    Pre-op Diagnosis:  Osteoarthritis of right knee [M17.11]    Post-op Diagnosis:  Post-Op Diagnosis Codes:     * Osteoarthritis of right knee [M17.11]    Procedure(s) (LRB):  ARTHROPLASTY-KNEE (Right)    Anesthesia: spinal    Description of the findings of the procedure:severe o.a    Estimated Blood Loss: 150 mL         Specimens:   Specimen (12h ago through future)    None

## 2017-06-27 NOTE — PLAN OF CARE
Problem: Patient Care Overview  Goal: Plan of Care Review  Outcome: Ongoing (interventions implemented as appropriate)  A&Ox4. Gould draining clear, yellow urine. IVF infusing per order. VSS. Telemetry monitoring in use. Remains afebrile throughout shift. Remains fall-free throughout shift. Comfort level established. Good pain control with prn medications. Bed low, brakes locked, SR up x2, call light within reach. Verbalized understanding of poc. Open communication facilitated. Will continue to monitor.

## 2017-06-27 NOTE — ANESTHESIA POSTPROCEDURE EVALUATION
"Anesthesia Post Evaluation    Patient: Lei Hsu    Procedure(s) Performed: Procedure(s) (LRB):  ARTHROPLASTY-KNEE (Right)    Final Anesthesia Type: spinal  Patient location during evaluation: PACU  Patient participation: Yes- Able to Participate  Level of consciousness: awake and alert and oriented  Post-procedure vital signs: reviewed and stable  Pain management: adequate  Airway patency: patent  PONV status at discharge: No PONV  Anesthetic complications: no      Cardiovascular status: blood pressure returned to baseline  Respiratory status: unassisted, spontaneous ventilation and room air  Hydration status: euvolemic  Follow-up not needed.        Visit Vitals  /72 (BP Location: Left arm, Patient Position: Lying, BP Method: Automatic)   Pulse (!) 57   Temp 36.4 °C (97.6 °F) (Oral)   Resp 16   Ht 5' 11.5" (1.816 m)   Wt 115.7 kg (255 lb)   SpO2 97%   BMI 35.07 kg/m²       Pain/Pop Score: Pain Assessment Performed: Yes (6/27/2017 11:15 AM)  Presence of Pain: denies (6/27/2017 11:15 AM)  Pain Rating Prior to Med Admin: 0 (6/27/2017 11:10 AM)  Pop Score: 8 (6/27/2017 11:15 AM)      "

## 2017-06-27 NOTE — ANESTHESIA PROCEDURE NOTES
Peripheral    Patient location during procedure: pre-op   Block not for primary anesthetic.  Reason for block: at surgeon's request and post-op pain management   Post-op Pain Location: right knee  Start time: 6/27/2017 7:15 AM  Timeout: 6/27/2017 7:15 AM   End time: 6/27/2017 7:28 AM  Surgery related to: right tka  Staffing  Anesthesiologist: TANA CLARK II  Performed: anesthesiologist   Preanesthetic Checklist  Completed: patient identified, site marked, surgical consent, pre-op evaluation, timeout performed, IV checked, risks and benefits discussed and monitors and equipment checked  Peripheral Block  Patient position: supine  Prep: ChloraPrep  Patient monitoring: heart rate, cardiac monitor, continuous pulse ox and frequent blood pressure checks  Block type: adductor canal  Laterality: right  Injection technique: continuous  Needle  Needle type: Tuohy   Needle length: 4 in  Needle localization: anatomical landmarks and ultrasound guidance  Catheter type: non-stimulating  Catheter size: 20 G  Test dose: lidocaine 1.5% with Epi 1-to-200,000 and negative   -ultrasound image captured on disc.  Assessment  Injection assessment: negative aspiration, negative parasthesia and local visualized surrounding nerve  Paresthesia pain: none  Heart rate change: no  Slow fractionated injection: yes  Medications:  Bolus administered: 20 mL of 0.2 ropivacaine  Epinephrine added: none

## 2017-06-27 NOTE — ANESTHESIA PREPROCEDURE EVALUATION
06/27/2017  Lei Hsu is a 62 y.o., male.    Anesthesia Evaluation    I have reviewed the Patient Summary Reports.    I have reviewed the Nursing Notes.   I have reviewed the Medications.     Review of Systems  Anesthesia Hx:  No problems with previous Anesthesia Denies Hx of Anesthetic complications    Social:  Smoker    Cardiovascular:   Hypertension Denies Valvular problems/Murmurs.  Denies CAD.    Denies Dysrhythmias.   Denies Angina. hyperlipidemia    Pulmonary:   Denies COPD.  Denies Asthma. Shortness of breath  Denies Recent URI.  Denies Sleep Apnea.    Renal/:   Denies Chronic Renal Disease.     Hepatic/GI:   Denies GERD. Denies Liver Disease.    Musculoskeletal:   Arthritis     Neurological:   Denies TIA. Denies CVA. Denies Seizures.    Endocrine:   Denies Diabetes. Denies Hypothyroidism.        Physical Exam  General:  Well nourished, Obesity    Airway/Jaw/Neck:  Airway Findings: Mouth Opening: Normal Tongue: Normal  General Airway Assessment: Adult, Good  Mallampati: III  Improves to II with phonation.  TM Distance: 4-6 cm      Dental:  Dental Findings: In tact   Chest/Lungs:  Chest/Lungs Findings: Clear to auscultation, Normal Respiratory Rate     Heart/Vascular:  Heart Findings: Rate: Normal  Rhythm: Regular Rhythm  Sounds: Normal  Heart murmur: negative       Mental Status:  Mental Status Findings:  Cooperative, Alert and Oriented         Anesthesia Plan  Type of Anesthesia, risks & benefits discussed:  Anesthesia Type:  spinal  Patient's Preference:   Intra-op Monitoring Plan:   Intra-op Monitoring Plan Comments:   Post Op Pain Control Plan:   Post Op Pain Control Plan Comments:   Induction:   IV  Beta Blocker:  Patient is not currently on a Beta-Blocker (No further documentation required).       Informed Consent: Patient understands risks and agrees with Anesthesia plan.  Questions  answered. Anesthesia consent signed with patient.  ASA Score: 2     Day of Surgery Review of History & Physical:    H&P update referred to the surgeon.         Ready For Surgery From Anesthesia Perspective.

## 2017-06-27 NOTE — NURSING TRANSFER
Nursing Transfer Note      6/27/2017     Transfer To: 313    Transfer via bed    Transfer with 2 lpm  nc to O2    Transported by damon MAK/[place]    Medicines sent: N/A    Chart send with patient: Yes    Notified: spouse  At the beside    Patient reassessed at: 6/27/2017   1150  Upon arrival to floor: patient oriented to room, call bell in reach and bed in lowest position    Report tot Sharri MAK  Dressing dry and intact  CHARMAINE bulb to suction  Wife at the bedside

## 2017-06-27 NOTE — PLAN OF CARE
06/27/17 1650   PRE-TX-O2-ETCO2   O2 Device (Oxygen Therapy) nasal cannula   $ Is the patient on Oxygen? Yes   Flow (L/min) 2   Oxygen Concentration (%) 28   SpO2 96 %   Pulse Oximetry Type Intermittent   $ Pulse Oximetry - Multiple Charge Pulse Oximetry - Multiple   Ready to Wean/Extubation Screen   FIO2<60 (chart decimal) 0.28

## 2017-06-27 NOTE — H&P
PCP: Lei North MD    History & Physical    Chief Complaint: s/p right TKA    History of Present Illness:  Patient is a 62 y.o. male admitted to Hospitalist Service from Operation Room s/p right total knee arthroplasty p[erformed by Dr. Almonte. Patient reportedly has past medical history significant for OA, hypertension, hyperlipidemia and nicotine addiction. Post-operatively, patient denied chest pain, shortness of breath, abdominal pain, nausea, vomiting, headache, vision changes, focal neuro-deficits, cough or fever.    Past Medical History:   Diagnosis Date    Atherosclerosis     Cardiac murmur     Hyperlipidemia     Hypertension     Tobacco abuse      Past Surgical History:   Procedure Laterality Date    BACK SURGERY      L3-L4    KNEE ARTHROSCOPY      KNEE SURGERY       History reviewed. No pertinent family history.  Social History   Substance Use Topics    Smoking status: Current Every Day Smoker     Packs/day: 1.00     Years: 40.00     Types: Cigarettes    Smokeless tobacco: Never Used    Alcohol use No      Review of patient's allergies indicates:  No Known Allergies  PTA Medications   Medication Sig    diazePAM (VALIUM) 10 MG Tab Take 10 mg by mouth 2 (two) times daily.     gabapentin (NEURONTIN) 300 MG capsule Take 300 mg by mouth 3 (three) times daily.    ketoconazole (NIZORAL) 2 % cream APPLY TO AFFECTED AREA TOPICALLY ONCE DAILY    oxycodone (ROXICODONE) 30 MG Tab Take 30 mg by mouth every 6 (six) hours as needed.     pravastatin (PRAVACHOL) 20 MG tablet Take 20 mg by mouth once daily.    aspirin (ECOTRIN) 81 MG EC tablet Take 81 mg by mouth once daily.    diclofenac (VOLTAREN) 25 MG TbEC Take 75 mg by mouth 3 (three) times daily.     Review of Systems:  Constitutional: no fever or chills  Eyes: no visual changes  Ears, nose, mouth, throat, and face: no nasal congestion or sore throat  Respiratory: no cough or shorness of breath  Cardiovascular: no chest pain or  palpitations  Gastrointestinal: no nausea or vomiting, no abdominal pain or change in bowel habits  Genitourinary: no hematuria or dysuria  Integument/breast: no rash or pruritis  Hematologic/lymphatic: no easy bruising or lymphadenopathy  Musculoskeletal: see HPI.  Neurological: no seizures or tremors.  Behavioral/Psych: no auditory or visual hallucinations  Endocrine: no heat or cold intolerance     OBJECTIVE:     Vital Signs (Most Recent)  Temp: 97.6 °F (36.4 °C) (06/27/17 1155)  Pulse: (!) 57 (06/27/17 1155)  Resp: 16 (06/27/17 1155)  BP: 124/72 (06/27/17 1155)  SpO2: 97 % (06/27/17 1155)    Physical Exam:  General appearance: well developed, appears stated age  Head: normocephalic, atraumatic  Eyes:  conjunctivae/corneas clear. PERRL.  Nose: Nares normal. Septum midline.  Throat: lips, mucosa, and tongue normal; teeth and gums normal, no throat erythema.  Neck: supple, symmetrical, trachea midline, no JVD and thyroid not enlarged, symmetric, no tenderness/mass/nodules  Lungs:  clear to auscultation bilaterally and normal respiratory effort  Chest wall: no tenderness  Heart: regular rate and rhythm, S1, S2 normal, no murmur, click, rub or gallop  Abdomen: soft, non-tender non-distented; bowel sounds normal; no masses,  no organomegaly  Extremities: no cyanosis, clubbing or edema. Right knee dressing C/D/I.  Pulses: 2+ and symmetric  Skin: Skin color, texture, turgor normal. No rashes or lesions.  Lymph nodes: Cervical, supraclavicular, and axillary nodes normal.  Neurologic: Normal strength and tone. No focal numbness or weakness. CNII-XII intact.      Laboratory:   CBC:   Recent Labs  Lab 06/21/17  1104   WBC 6.50   RBC 4.84   HGB 15.2   HCT 44.6   *   MCV 92   MCH 31.4*   MCHC 34.1     BMP:   Recent Labs  Lab 06/21/17  1104         K 4.6      CO2 28   BUN 25*   CREATININE 1.5*   CALCIUM 9.3     Diagnostic Results:  Right knee X-Ray: There is right TKA appearing in good position and  alignment without complicating factors.  The postoperative changes including a drain in the soft tissues    Assessment/Plan:     Active Hospital Problems    Diagnosis  POA    *Status post total right knee replacement [Z96.651]  Continue to follow Orthopedic recommendations.  Needs aggressive incentive spirometry.  Follow hemoglobin and hematocrit closely.  Pain control with IV narcotics and antiemetics as needed.  Physical therapy as per Orthopedics protocol with fall precautions.    Not Applicable    Osteoarthritis of right knee [M17.11]  PRN analgesics.    Yes    BMI 35.0-35.9,adult [Z68.35]  Body mass index is 35.07 kg/m². Morbid obesity complicates all aspects of disease management from diagnostic modalities to treatment. Weight loss encouraged and health benefits explained to patient.    Not Applicable    Tobacco use [Z72.0]  Smoking cessation counseling performed. Dangers of cigarette smoking were reviewed with patient in detail and patient was encouraged to quit. Nicotine replacement options were discussed for > 3 minutes.    Yes    Hypercholesterolemia [E78.00]  Chronic problem. Will continue chronic medications and monitor for any changes, adjusting as needed.    Yes    Essential hypertension [I10]  Chronic problem. Will continue chronic medications and monitor for any changes, adjusting as needed.    Yes     VTE Risk Mitigation         Ordered     Place sequential compression device  Until discontinued  - On  mg po bid as per Dr. Almonte.    06/27/17 1149     Medium Risk of VTE  Once      06/27/17 1151        Nina Brewer MD  Department of Hospital Medicine   [facility]

## 2017-06-27 NOTE — ANESTHESIA PROCEDURE NOTES
Peripheral    Patient location during procedure: pre-op   Block not for primary anesthetic.  Reason for block: at surgeon's request and post-op pain management   Post-op Pain Location: right knee  Start time: 6/27/2017 7:15 AM  Timeout: 6/27/2017 7:15 AM   End time: 6/27/2017 7:28 AM  Surgery related to: right tka  Staffing  Anesthesiologist: TANA CLARK II  Performed: anesthesiologist   Preanesthetic Checklist  Completed: patient identified, site marked, surgical consent, pre-op evaluation, timeout performed, IV checked, risks and benefits discussed and monitors and equipment checked  Peripheral Block  Patient position: supine  Prep: ChloraPrep  Patient monitoring: heart rate, cardiac monitor, continuous pulse ox and frequent blood pressure checks  Block type: adductor canal  Laterality: right  Injection technique: single shot  Needle  Needle type: Stimuplex   Needle gauge: 21 G  Needle length: 4 in  Needle localization: anatomical landmarks and ultrasound guidance   -ultrasound image captured on disc.  Assessment  Injection assessment: negative aspiration, negative parasthesia and local visualized surrounding nerve  Paresthesia pain: none  Heart rate change: no  Slow fractionated injection: yes  Medications:  Bolus administered: 20 mL of 0.25 bupivacaine  Epinephrine added: 5 mcg/mL (1/200,000)

## 2017-06-27 NOTE — PT/OT/SLP EVAL
"Physical Therapy  Evaluation    Lei Hsu   MRN: [id number]   Admitting Diagnosis: Status post total right knee replacement    PT Received On: 06/27/17  PT Start Time: 1413     PT Stop Time: 1453    PT Total Time (min): 40 min       Billable Minutes:  Evaluation 10, Gait [id number] and Therapeutic Exercise 10    Diagnosis: Status post total right knee replacement  06-    Past Medical History:   Diagnosis Date    Atherosclerosis     Cardiac murmur     Hyperlipidemia     Hypertension     Tobacco abuse       Past Surgical History:   Procedure Laterality Date    BACK SURGERY      L3-L4    KNEE ARTHROSCOPY      KNEE SURGERY         Referring physician: Suzy  Date referred to PT: 06-    General Precautions: Standard, fall  Orthopedic Precautions: RLE weight bearing as tolerated   Braces:              Patient History:  Lives With: spouse  Living Arrangements: house  Home Accessibility: stairs to enter home  Number of Stairs to Enter Home: 11  Transportation Available: family or friend will provide  Living Environment Comment: home with spouse in an elevated home with  elevator nonfunctional and will not be fixed at discharged  DME owned (not currently used):     Previous Level of Function:  Ambulation Skills: independent  Transfer Skills: independent  ADL Skills: independent    Subjective:  Communicated with nurse Harrell prior to session.  Pt seen supine in bed with spouse present- pt with slurring of speech- stated "[place]"   Spouse agreed that it is slurred and from medications  Chief Complaint: weakness  Patient goals: get well    Pain/Comfort  Pain Rating 1: 5/10  Location - Side 1: Right  Location 1: knee  Pain Addressed 1: Reposition      Objective:   Patient found with: CHARMAINE drain, peripheral IV, Polar ice, perineural catheter     Cognitive Exam:  Oriented to: Person, Place, Time and Situation    Follows Commands/attention: Follows multistep  commands  Communication: slurring  Safety " awareness/insight to disability: impaired    Physical Exam:  Postural examination/scapula alignment: Rounded shoulder and Head forward    Skin integrity: dressing RK with CHARMAINE drain and iceman  Edema:     Sensation:   Intact    Upper Extremity Range of Motion:  Right Upper Extremity: WFL  Left Upper Extremity: WFL    Upper Extremity Strength:  Right Upper Extremity: WFL  Left Upper Extremity: WFL    Lower Extremity Range of Motion:  Right Lower Extremity: knee flexion 80 degrees  Left Lower Extremity: WFL    Lower Extremity Strength:  Right Lower Extremity: 3/5  Left Lower Extremity: 4-/5     Fine motor coordination:      Gross motor coordination:     Functional Mobility:  Bed Mobility:  Supine to Sit: Moderate Assistance  Sit to Supine: Moderate Assistance    Transfers:  Sit <> Stand Assistance: Moderate Assistance  Sit <> Stand Assistive Device: Rolling Walker    Gait:   Gait Distance: 60ft with RW with rest stands, no KI, kyphotic posture  Assistance 1: Moderate assistance  Gait Assistive Device: Rolling walker  Gait Pattern: 3-point gait  Gait Deviation(s): decreased braydon, increased time in double stance, decreased velocity of limb motion, decreased step length    Stairs:      Balance:   Static Sit: FAIR-: Maintains without assist but inconsistent   Dynamic Sit: FAIR: Cannot move trunk without losing balance  Static Stand: POOR: Needs MODERATE assist to maintain  Dynamic stand: POOR: N/A    Therapeutic Activities and Exercises:  EOB sitting with assist to RLE  Sit to stand mod assist with VC for safety- pt does not have KI ordered  Ambulated with RW to hallways with vc for posture awareness- kyphotic/back problems  Back to bed with CPM set up 0/60 degrees  Nurse Harrell at bedside    AM-PAC 6 CLICK MOBILITY  How much help from another person does this patient currently need?   1 = Unable, Total/Dependent Assistance  2 = A lot, Maximum/Moderate Assistance  3 = A little, Minimum/Contact Guard/Supervision  4 =  None, Modified Webb/Independent          AM-PAC Raw Score CMS G-Code Modifier Level of Impairment Assistance   6 % Total / Unable   7 - 9 CM 80 - 100% Maximal Assist   10 - 14 CL 60 - 80% Moderate Assist   15 - 19 CK 40 - 60% Moderate Assist   20 - 22 CJ 20 - 40% Minimal Assist   23 CI 1-20% SBA / CGA   24 CH 0% Independent/ Mod I     Patient left supine with all lines intact, call button in reach and nurse demetrio present.    Assessment:   Lei Hsu is a 62 y.o. male with a medical diagnosis of Status post total right knee replacement and seen po Day 0. Pt alert but appears medicated with slurring of speech. Previously home with spouse, functional and independent in an elevated home with stairs. Pt stated elevator is broke and stated will not be fixed in time for discharge. Pt will benefit from PT to restore strength and function.    Rehab identified problem list/impairments: Rehab identified problem list/impairments: weakness, impaired endurance, gait instability, impaired functional mobilty, impaired self care skills, impaired balance, decreased lower extremity function, decreased safety awareness, pain, decreased ROM    Rehab potential is fair.    Activity tolerance: Fair    Discharge recommendations: Discharge Facility/Level Of Care Needs: home with home health, home health PT     Barriers to discharge: Barriers to Discharge: Inaccessible home environment    Equipment recommendations: Equipment Needed After Discharge: walker, rolling     GOALS:    Physical Therapy Goals        Problem: Physical Therapy Goal    Goal Priority Disciplines Outcome Goal Variances Interventions   Physical Therapy Goal    High PT/OT, PT      Description:  Goals to be met by: 2017     Patient will increase functional independence with mobility by performin. Supine to sit with Contact Guard Assistance  2. Sit to supine with Contact Guard Assistance  3. Sit to stand transfer with Contact Guard  Assistance  4. Bed to chair transfer with Contact Guard Assistance using Rolling Walker  5. Gait  x 150 feet with Minimal Assistance using Rolling Walker.   6. Lower extremity exercise program x20 reps per handout, with assistance as needed                      PLAN:    Patient to be seen BID to address the above listed problems via gait training, therapeutic activities, therapeutic exercises  Plan of Care expires: 07/07/17  Plan of Care reviewed with: patient, spouse          Lacy Osuna, PT  06/27/2017

## 2017-06-27 NOTE — OR NURSING
SCD placed to left lower extremity prior to procedure start.  Removed by first assistant states MD does not want for procedure.  KATHY RN

## 2017-06-28 LAB
ANION GAP SERPL CALC-SCNC: 8 MMOL/L
BASOPHILS # BLD AUTO: 0.1 K/UL
BASOPHILS NFR BLD: 0.7 %
BUN SERPL-MCNC: 18 MG/DL
CALCIUM SERPL-MCNC: 8.7 MG/DL
CHLORIDE SERPL-SCNC: 104 MMOL/L
CO2 SERPL-SCNC: 27 MMOL/L
CREAT SERPL-MCNC: 1.1 MG/DL
DIFFERENTIAL METHOD: ABNORMAL
EOSINOPHIL # BLD AUTO: 0 K/UL
EOSINOPHIL NFR BLD: 0.2 %
ERYTHROCYTE [DISTWIDTH] IN BLOOD BY AUTOMATED COUNT: 12.9 %
EST. GFR  (AFRICAN AMERICAN): >60 ML/MIN/1.73 M^2
EST. GFR  (NON AFRICAN AMERICAN): >60 ML/MIN/1.73 M^2
GLUCOSE SERPL-MCNC: 206 MG/DL
HCT VFR BLD AUTO: 33.9 %
HGB BLD-MCNC: 11.6 G/DL
LYMPHOCYTES # BLD AUTO: 1.5 K/UL
LYMPHOCYTES NFR BLD: 20.9 %
MCH RBC QN AUTO: 31.5 PG
MCHC RBC AUTO-ENTMCNC: 34.3 %
MCV RBC AUTO: 92 FL
MONOCYTES # BLD AUTO: 0.6 K/UL
MONOCYTES NFR BLD: 7.9 %
NEUTROPHILS # BLD AUTO: 5.2 K/UL
NEUTROPHILS NFR BLD: 70.3 %
PLATELET # BLD AUTO: 95 K/UL
PMV BLD AUTO: 9.1 FL
POTASSIUM SERPL-SCNC: 4.6 MMOL/L
RBC # BLD AUTO: 3.69 M/UL
SODIUM SERPL-SCNC: 139 MMOL/L
WBC # BLD AUTO: 7.3 K/UL

## 2017-06-28 PROCEDURE — 97530 THERAPEUTIC ACTIVITIES: CPT

## 2017-06-28 PROCEDURE — 85025 COMPLETE CBC W/AUTO DIFF WBC: CPT

## 2017-06-28 PROCEDURE — 11000001 HC ACUTE MED/SURG PRIVATE ROOM

## 2017-06-28 PROCEDURE — 80048 BASIC METABOLIC PNL TOTAL CA: CPT

## 2017-06-28 PROCEDURE — 25000003 PHARM REV CODE 250: Performed by: ORTHOPAEDIC SURGERY

## 2017-06-28 PROCEDURE — 27000221 HC OXYGEN, UP TO 24 HOURS

## 2017-06-28 PROCEDURE — 63600175 PHARM REV CODE 636 W HCPCS: Performed by: ORTHOPAEDIC SURGERY

## 2017-06-28 PROCEDURE — 97116 GAIT TRAINING THERAPY: CPT

## 2017-06-28 PROCEDURE — 25000003 PHARM REV CODE 250: Performed by: INTERNAL MEDICINE

## 2017-06-28 PROCEDURE — 99232 SBSQ HOSP IP/OBS MODERATE 35: CPT | Mod: ,,, | Performed by: INTERNAL MEDICINE

## 2017-06-28 PROCEDURE — 36415 COLL VENOUS BLD VENIPUNCTURE: CPT

## 2017-06-28 PROCEDURE — 94799 UNLISTED PULMONARY SVC/PX: CPT

## 2017-06-28 PROCEDURE — 94761 N-INVAS EAR/PLS OXIMETRY MLT: CPT

## 2017-06-28 PROCEDURE — 25000003 PHARM REV CODE 250: Performed by: ANESTHESIOLOGY

## 2017-06-28 RX ADMIN — ACETAMINOPHEN 1000 MG: 500 TABLET, FILM COATED ORAL at 01:06

## 2017-06-28 RX ADMIN — HYDROMORPHONE HYDROCHLORIDE 0.5 MG: 2 INJECTION INTRAMUSCULAR; INTRAVENOUS; SUBCUTANEOUS at 12:06

## 2017-06-28 RX ADMIN — ACETAMINOPHEN 1000 MG: 500 TABLET, FILM COATED ORAL at 05:06

## 2017-06-28 RX ADMIN — GABAPENTIN 300 MG: 300 CAPSULE ORAL at 06:06

## 2017-06-28 RX ADMIN — ASPIRIN 325 MG ORAL TABLET 325 MG: 325 PILL ORAL at 09:06

## 2017-06-28 RX ADMIN — METHOCARBAMOL 500 MG: 500 TABLET ORAL at 02:06

## 2017-06-28 RX ADMIN — GABAPENTIN 300 MG: 300 CAPSULE ORAL at 09:06

## 2017-06-28 RX ADMIN — METHOCARBAMOL 500 MG: 500 TABLET ORAL at 09:06

## 2017-06-28 RX ADMIN — STANDARDIZED SENNA CONCENTRATE AND DOCUSATE SODIUM 1 TABLET: 8.6; 5 TABLET, FILM COATED ORAL at 09:06

## 2017-06-28 RX ADMIN — HYDROMORPHONE HYDROCHLORIDE 0.5 MG: 2 INJECTION INTRAMUSCULAR; INTRAVENOUS; SUBCUTANEOUS at 03:06

## 2017-06-28 RX ADMIN — PRAVASTATIN SODIUM 20 MG: 10 TABLET ORAL at 09:06

## 2017-06-28 RX ADMIN — HYDROMORPHONE HYDROCHLORIDE 0.5 MG: 2 INJECTION INTRAMUSCULAR; INTRAVENOUS; SUBCUTANEOUS at 04:06

## 2017-06-28 RX ADMIN — GABAPENTIN 300 MG: 300 CAPSULE ORAL at 02:06

## 2017-06-28 RX ADMIN — HYDROMORPHONE HYDROCHLORIDE 0.5 MG: 2 INJECTION INTRAMUSCULAR; INTRAVENOUS; SUBCUTANEOUS at 08:06

## 2017-06-28 RX ADMIN — ACETAMINOPHEN 1000 MG: 500 TABLET, FILM COATED ORAL at 06:06

## 2017-06-28 RX ADMIN — MUPIROCIN 1 G: 20 OINTMENT TOPICAL at 08:06

## 2017-06-28 RX ADMIN — MUPIROCIN 1 G: 20 OINTMENT TOPICAL at 09:06

## 2017-06-28 RX ADMIN — ACETAMINOPHEN 1000 MG: 500 TABLET, FILM COATED ORAL at 11:06

## 2017-06-28 RX ADMIN — METHOCARBAMOL 500 MG: 500 TABLET ORAL at 06:06

## 2017-06-28 RX ADMIN — HYDROMORPHONE HYDROCHLORIDE 0.5 MG: 2 INJECTION INTRAMUSCULAR; INTRAVENOUS; SUBCUTANEOUS at 09:06

## 2017-06-28 NOTE — PROGRESS NOTES
Progress Note  Hospital Medicine  Patient Name:Lei Hsu  MRN:  [id number]  Patient Class: IP- Inpatient  Admit Date: 6/27/2017  Length of Stay: 1 days  Expected Discharge Date:   Attending Physician: Nina Brewer MD  Primary Care Provider:  Lei North MD    SUBJECTIVE:     Principal Problem: Status post total right knee replacement  Initial history of present illness: Patient is a 62 y.o. male admitted to Hospitalist Service from [facility] s/p right total knee arthroplasty p[erformed by Dr. Almonte. Patient reportedly has past medical history significant for OA, hypertension, hyperlipidemia and nicotine addiction. Post-operatively, patient denied chest pain, shortness of breath, abdominal pain, nausea, vomiting, headache, vision changes, focal neuro-deficits, cough or fever.    PMH/PSH/SH/FH/Meds: reviewed.    Symptoms/Review of Systems: Complaining of surgical knee pain, worked with PT. No shortness of breath, cough, chest pain or headache, fever or abdominal pain.     Diet:  Adequate intake.    Activity level: Normal.    Pain: Chronic pains    OBJECTIVE:   Vital Signs (Most Recent):      Temp: 98.1 °F (36.7 °C) (06/27/17 2005)  Pulse: 60 (06/27/17 2305)  Resp: 18 (06/27/17 2305)  BP: 132/80 (06/27/17 2005)  SpO2: 98 % (06/27/17 2305)       Vital Signs Range (Last 24H):  Temp:  [97.6 °F (36.4 °C)-98.1 °F (36.7 °C)]   Pulse:  [54-92]   Resp:  [5-18]   BP: (108-132)/(58-80)   SpO2:  [94 %-98 %]     Weight: 115.7 kg (255 lb)  Body mass index is 35.07 kg/m².    Intake/Output Summary (Last 24 hours) at 06/28/17 0841  Last data filed at 06/28/17 0600   Gross per 24 hour   Intake             1825 ml   Output             3195 ml   Net            -1370 ml     Physical Examination:  General appearance: well developed, appears stated age  Head: normocephalic, atraumatic  Eyes:  conjunctivae/corneas clear. PERRL.  Nose: Nares normal. Septum midline.  Throat: lips, mucosa, and tongue normal; teeth and gums normal,  no throat erythema.  Neck: supple, symmetrical, trachea midline, no JVD and thyroid not enlarged, symmetric, no tenderness/mass/nodules  Lungs:  clear to auscultation bilaterally and normal respiratory effort  Chest wall: no tenderness  Heart: regular rate and rhythm, S1, S2 normal, no murmur, click, rub or gallop  Abdomen: soft, non-tender non-distented; bowel sounds normal; no masses,  no organomegaly  Extremities: no cyanosis, clubbing or edema. Right knee dressing C/D/I.  Pulses: 2+ and symmetric  Skin: Skin color, texture, turgor normal. No rashes or lesions.  Lymph nodes: Cervical, supraclavicular, and axillary nodes normal.  Neurologic: Normal strength and tone. No focal numbness or weakness. CNII-XII intact.      CBC:    Recent Labs  Lab 06/21/17  1104 06/28/17  0536   WBC 6.50 7.30   RBC 4.84 3.69*   HGB 15.2 11.6*   HCT 44.6 33.9*   * 95*   MCV 92 92   MCH 31.4* 31.5*   MCHC 34.1 34.3   BMP    Recent Labs  Lab 06/21/17  1104 06/28/17  0535    206*    139   K 4.6 4.6    104   CO2 28 27   BUN 25* 18   CREATININE 1.5* 1.1   CALCIUM 9.3 8.7      Diagnostic Results:  Microbiology Results (last 7 days)     ** No results found for the last 168 hours. **      Right knee X-Ray: There is right TKA appearing in good position and alignment without complicating factors.  The postoperative changes including a drain in the soft tissues    Assessment/Plan:      *Status post total right knee replacement [Z96.651]  Continue to follow Orthopedic recommendations.  Needs aggressive incentive spirometry.  Follow hemoglobin and hematocrit closely.  Pain control with PO narcotics and antiemetics as needed.  Physical therapy as per Orthopedics protocol with fall precautions.     Not Applicable    Osteoarthritis of right knee [M17.11]  PRN analgesics.      Yes    BMI 35.0-35.9,adult [Z68.35]  Body mass index is 35.07 kg/m². Morbid obesity complicates all aspects of disease management from diagnostic  modalities to treatment. Weight loss encouraged and health benefits explained to patient.     Not Applicable    Tobacco use [Z72.0]  Smoking cessation counseling performed. Dangers of cigarette smoking were reviewed with patient in detail and patient was encouraged to quit.      Yes    Hypercholesterolemia [E78.00]  Chronic problem. Will continue chronic medications and monitor for any changes, adjusting as needed.     Yes    Essential hypertension [I10]  Chronic problem. Will continue chronic medications and monitor for any changes, adjusting as needed.     Yes         VTE Risk Mitigation         Ordered     Place sequential compression device  Until discontinued      06/27/17 1149     Medium Risk of VTE  Once      06/27/17 1151        Nina Brewer MD  Department of Hospital Medicine   [facility]

## 2017-06-28 NOTE — PLAN OF CARE
06/28/17 1148   PRE-TX-O2-ETCO2   O2 Device (Oxygen Therapy) nasal cannula   $ Is the patient on Oxygen? Yes   Flow (L/min) 2   Oxygen Concentration (%) 28   SpO2 97 %   Pulse Oximetry Type Intermittent   $ Pulse Oximetry - Multiple Charge Pulse Oximetry - Multiple   Ready to Wean/Extubation Screen   FIO2<60 (chart decimal) 0.28

## 2017-06-28 NOTE — PLAN OF CARE
Problem: Patient Care Overview  Goal: Plan of Care Review  Outcome: Ongoing (interventions implemented as appropriate)  A&Ox4. Up with assist to commode. IVF infusing per order. VSS. Telemetry monitoring intact. Remains afebrile throughout shift. Remains fall-free throughout shift. Cryotherapy intact. Pain ball at 10 mL/hr. Dressing changed during my shift. Comfort level established. Good pain control with prn medications. Bed low, brakes locked, SR up x2, call light within reach. Verbalized understanding of poc. Open communication facilitated. Will continue to monitor.

## 2017-06-28 NOTE — PLAN OF CARE
06/28/17 1327   Incentive Spirometer   $ Incentive Spirometer Charges done with encouragement   Predicted Level (mL) (Incentive Spirometer) 3050   Administration (Incentive Spirometer) done with encouragement   Number of Repetitions (Incentive Spirometer) 10   Level (mL) (Incentive Spirometer) 1000   Patient Tolerance fair   instructed on IS

## 2017-06-28 NOTE — PROGRESS NOTES
[facility]  Orthopedics  Progress Note    Patient Name: Lei Hsu  MRN: [id number]  Admission Date: 6/27/2017  Hospital Length of Stay: 1 days  Attending Provider: Nina Brewer MD  Primary Care Provider: Lei North MD  Follow-up For: Procedure(s) (LRB):  ARTHROPLASTY-KNEE (Right)    Post-Operative Day: 1 Day Post-Op  Subjective:reasonable pain relief,  hct 33,  N/v ok     No new subjective & objective note has been filed under this hospital service since the last note was generated.    Assessment/Plan:routine tka rehab     No notes have been filed under this hospital service.  Service: Orthopedic Surgery        Lei Almonte Jr, MD  Orthopedics  [facility]

## 2017-06-28 NOTE — PLAN OF CARE
Problem: Physical Therapy Goal  Goal: Physical Therapy Goal  Goals to be met by: 2017     Patient will increase functional independence with mobility by performin. Supine to sit with Contact Guard Assistance  2. Sit to supine with Contact Guard Assistance  3. Sit to stand transfer with Contact Guard Assistance  4. Bed to chair transfer with Contact Guard Assistance using Rolling Walker  5. Gait  x 150 feet with Minimal Assistance using Rolling Walker.   6. Lower extremity exercise program x20 reps per handout, with assistance as needed     Outcome: Ongoing (interventions implemented as appropriate)  PT for gait training, transfer training, BLE there/ROM in AM and PM.

## 2017-06-28 NOTE — ANESTHESIA POST-OP PAIN MANAGEMENT
Acute Pain Service Progress Note    Lei Hsu is a 62 y.o., male, [id number].    Surgery:  TKA    Post Op Day #: 1    Catheter type: perineural  femoral    Infusion type: Ropivacaine 0.2%  8 basal    Problem List:    Active Hospital Problems    Diagnosis  POA    *Status post total right knee replacement [Z96.651]  Not Applicable    Osteoarthritis of right knee [M17.11]  Yes    BMI 35.0-35.9,adult [Z68.35]  Not Applicable    Tobacco use [Z72.0]  Yes    Hypercholesterolemia [E78.00]  Yes    Essential hypertension [I10]  Yes      Resolved Hospital Problems    Diagnosis Date Resolved POA   No resolved problems to display.       Subjective:     General appearance of sleepy   Pain with rest: 5    Numbers   Pain with movement: 5    Numbers   Side Effects    1. Pruritis No    2. Nausea No    3. Motor Blockade No, 2=Inability to bend knees    4. Sedation No, 2=slightly drowsy, easily aroused    Objective:        Vitals   Vitals:    06/28/17 1231   BP: 117/79   Pulse: 63   Resp: 20   Temp: 36.8 °C (98.3 °F)        Labs    Admission on 06/27/2017   Component Date Value Ref Range Status    WBC 06/28/2017 7.30  3.90 - 12.70 K/uL Final    RBC 06/28/2017 3.69* 4.60 - 6.20 M/uL Final    Hemoglobin 06/28/2017 11.6* 14.0 - 18.0 g/dL Final    Hematocrit 06/28/2017 33.9* 40.0 - 54.0 % Final    MCV 06/28/2017 92  82 - 98 fL Final    MCH 06/28/2017 31.5* 27.0 - 31.0 pg Final    MCHC 06/28/2017 34.3  32.0 - 36.0 % Final    RDW 06/28/2017 12.9  11.5 - 14.5 % Final    Platelets 06/28/2017 95* 150 - 350 K/uL Final    MPV 06/28/2017 9.1* 9.2 - 12.9 fL Final    Gran # 06/28/2017 5.2  1.8 - 7.7 K/uL Final    Lymph # 06/28/2017 1.5  1.0 - 4.8 K/uL Final    Mono # 06/28/2017 0.6  0.3 - 1.0 K/uL Final    Eos # 06/28/2017 0.0  0.0 - 0.5 K/uL Final    Baso # 06/28/2017 0.10  0.00 - 0.20 K/uL Final    Gran% 06/28/2017 70.3  38.0 - 73.0 % Final    Lymph% 06/28/2017 20.9  18.0 - 48.0 % Final    Mono% 06/28/2017 7.9  4.0 -  15.0 % Final    Eosinophil% 06/28/2017 0.2  0.0 - 8.0 % Final    Basophil% 06/28/2017 0.7  0.0 - 1.9 % Final    Differential Method 06/28/2017 Automated   Final    Sodium 06/28/2017 139  136 - 145 mmol/L Final    Potassium 06/28/2017 4.6  3.5 - 5.1 mmol/L Final    Chloride 06/28/2017 104  95 - 110 mmol/L Final    CO2 06/28/2017 27  23 - 29 mmol/L Final    Glucose 06/28/2017 206* 70 - 110 mg/dL Final    BUN, Bld 06/28/2017 18  8 - 23 mg/dL Final    Creatinine 06/28/2017 1.1  0.5 - 1.4 mg/dL Final    Calcium 06/28/2017 8.7  8.7 - 10.5 mg/dL Final    Anion Gap 06/28/2017 8  8 - 16 mmol/L Final    eGFR if  06/28/2017 >60  >60 mL/min/1.73 m^2 Final    eGFR if non African American 06/28/2017 >60  >60 mL/min/1.73 m^2 Final        Meds   Current Facility-Administered Medications   Medication Dose Route Frequency Provider Last Rate Last Dose    0.45% NaCl infusion   Intravenous Continuous Nina Brewer MD 50 mL/hr at 06/27/17 1330      acetaminophen tablet 1,000 mg  1,000 mg Oral Q6H Shaun Gutierrez II, MD   1,000 mg at 06/28/17 1135    [START ON 7/1/2017] acetaminophen tablet 650 mg  650 mg Oral Q6H Shaun Gutierrez II, MD        aspirin tablet 325 mg  325 mg Oral BID Nina Brewer MD   325 mg at 06/28/17 0902    diphenhydrAMINE injection 12.5 mg  12.5 mg Intravenous PRN Shaun Gutierrez II, MD        gabapentin capsule 300 mg  300 mg Oral TID Lei Almonte Jr., MD   300 mg at 06/28/17 0622    hydromorphone (PF) injection 0.5 mg  0.5 mg Intravenous Q3H PRN Lei Almonte Jr., MD   0.5 mg at 06/28/17 1207    methocarbamol tablet 500 mg  500 mg Oral TID Shaun Gutierrez II, MD   500 mg at 06/28/17 0622    mupirocin 2 % ointment 1 g  1 g Nasal BID Lei Almonte Jr., MD   1 g at 06/28/17 0902    ondansetron injection 4 mg  4 mg Intravenous Q12H PRN Shaun Gutierrez II, MD        pravastatin tablet 20 mg  20 mg Oral Daily Lei Almonte Jr., MD   20 mg at 06/28/17 0902    promethazine  (PHENERGAN) 6.25 mg in dextrose 5 % 50 mL IVPB  6.25 mg Intravenous Q6H PRN Shaun Gutierrez II, MD        ropivacaine (NAROPIN) 0.2% ON-Q C-BLOC 750mL (select a flow)   Perineural Continuous Shaun Gutierrez II, MD 8 mL/hr at 06/27/17 1053      senna-docusate 8.6-50 mg per tablet 1 tablet  1 tablet Oral BID Lei Almonte Jr., MD   1 tablet at 06/28/17 0902           Assessment:     Pain control inadequate    Plan:    Increased the infusion to 10 ml/hour    Evaluator Gregory Smith

## 2017-06-28 NOTE — PT/OT/SLP PROGRESS
Occupational Therapy      Lei Hsu  MRN: [id number]    Patient not seen today secondary to  (1st attempt, wife wanted pt to sleep and 2nd attempt with PT). Will follow-up.    ARIA Genao  6/28/2017

## 2017-06-28 NOTE — PLAN OF CARE
Report received, patient care assumed. VS stable. Patient resting easily awoke with verbal stimuli. Family at bedside. Call light within reach.

## 2017-06-28 NOTE — PLAN OF CARE
Report given to oncoming nurse. Patient continues sleep heavily awakening only with repeated verbal stimuli. CPM in use, correct alignment maintained with cryo in use and refilled. CHARMAINE drained 100cc. Gould emptied 1000. NV checks wnl.

## 2017-06-28 NOTE — PT/OT/SLP PROGRESS
"Physical Therapy  Treatment    Lei Hsu   MRN: [id number]   Admitting Diagnosis: Status post total right knee replacement    PT Received On: 06/28/17  PT Start Time: 1351     PT Stop Time: 1408    PT Total Time (min): 17 min       Billable Minutes:  Gait Training9 and therapeutic activity 8    Treatment Type: Treatment  PT/PTA: PTA     PTA Visit Number: 1       General Precautions: Standard, fall  Orthopedic Precautions: RLE weight bearing as tolerated   Braces:           Subjective:  Communicated with nurse Harrell prior to session.  Pt initially stating, "I don't feel like it." Pt agreeable with little encouragement    Pain/Comfort  Pain Rating 1:  (pt stating "8 or 10")  Location - Side 1: Right  Location 1: knee  Pain Addressed 1: Pre-medicate for activity, Reposition, Cessation of Activity, Nurse notified    Objective:   Patient found with: perineural catheter, oxygen, peripheral IV, Polar ice    Functional Mobility:  Bed Mobility:   Supine to Sit: Minimum Assistance  Sit to Supine: Moderate Assistance, With leg lift    Transfers:  Sit <> Stand Assistance: Moderate Assistance  Sit <> Stand Assistive Device: Rolling Walker      Gait:   Gait Distance: 50'  Assistance 1: Minimum assistance  Gait Assistive Device: Rolling walker  Gait Pattern: 3-point gait  Gait Deviation(s): decreased braydon, increased time in double stance, decreased velocity of limb motion, decreased step length, decreased stride length, decreased toe-to-floor clearance, decreased weight-shifting ability, excessive knee flexion, forward lean        Balance:   Static Sit: NORMAL: No deviations seen in posture held statically  Dynamic Sit: NORMAL: No deviations seen in posture held dynamically  Static Stand: FAIR: Maintains without assist but unable to take challenges  Dynamic stand: FAIR: Needs CONTACT GUARD during gait     Therapeutic Activities and Exercises:  Supine BLE therex: AP, SLR, hip abd/add x 10 reps each         Patient left " supine with all lines intact, call button in reach and nurse Sharri notified.    Assessment:  Lei Hsu is a 62 y.o. male with a medical diagnosis of Status post total right knee replacement and presents with impaired functional mobility/activity tolerance 2' generalized deconditioning and pain.    Rehab identified problem list/impairments: Rehab identified problem list/impairments: weakness, impaired endurance, impaired functional mobilty, gait instability, impaired balance, decreased lower extremity function, decreased safety awareness, pain, decreased ROM, orthopedic precautions    Rehab potential is fair.    Activity tolerance: Fair      GOALS:    Physical Therapy Goals        Problem: Physical Therapy Goal    Goal Priority Disciplines Outcome Goal Variances Interventions   Physical Therapy Goal    High PT/OT, PT      Description:  Goals to be met by: 2017     Patient will increase functional independence with mobility by performin. Supine to sit with Contact Guard Assistance  2. Sit to supine with Contact Guard Assistance  3. Sit to stand transfer with Contact Guard Assistance  4. Bed to chair transfer with Contact Guard Assistance using Rolling Walker  5. Gait  x 150 feet with Minimal Assistance using Rolling Walker.   6. Lower extremity exercise program x20 reps per handout, with assistance as needed                      PLAN:    Patient to be seen BID  to address the above listed problems via gait training, therapeutic activities, therapeutic exercises  Plan of Care expires: 17  Plan of Care reviewed with: patient         Araceli Anne, PTA  2017

## 2017-06-28 NOTE — PLAN OF CARE
The pt lives at home with his wife, Brittaney [phone]. Brittaney was present in the room and assisted with assessment questions. The pt denies HH but his wife is active with [facility] and they agree to see them both. The pt does not have any DME but his wife has ample that he can use including a walker, BSC, wc, and sc. He does not feel like he needs any additional DME.  The pt had a CPM machine delivered to his home last Friday. He uses [facility]. He sees Dr. North and has Medicare and [employer] insurance. He has no questions or concerns and I wrote my name and number on the pts white board. DC plan is home with [facility]. Dorene Chakraborty LMSW     06/28/17 1232   Discharge Assessment   Assessment Type Discharge Planning Assessment   Confirmed/corrected address and phone number on facesheet? Yes   Assessment information obtained from? Patient   Communicated expected length of stay with patient/caregiver no   Type of Healthcare Directive Received (Brittaney Coppolado [phone])   If Healthcare Directive is received, is it scanned into Epic? no (comment)   Prior to hospitilization cognitive status: Alert/Oriented   Prior to hospitalization functional status: Assistive Equipment   Current cognitive status: Alert/Oriented   Current Functional Status: Assistive Equipment   Lives With spouse   Able to Return to Prior Arrangements yes   Is patient able to care for self after discharge? Yes   How many people do you have in your home that can help with your care after discharge? 1   Readmission Within The Last 30 Days no previous admission in last 30 days   Patient currently being followed by outpatient case management? No   Patient currently receives home health services? No   Does the patient currently use HME? Yes   Patient currently receives private duty nursing? No   Patient currently receives any other outside agency services? No   Equipment Currently Used at Home shower chair;walker,  standard;wheelchair;other (see comments)  (CPM Machine)   Do you have any problems affording any of your prescribed medications? No  ([facility] )   Is the patient taking medications as prescribed? yes   Do you have any financial concerns preventing you from receiving the healthcare you need? No  (Medicare and [employer] )   Does the patient have transportation to healthcare appointments? Yes   Transportation Available car   On Dialysis? No   Does the patient receive services at the [facility]mad[facility]? No   Are there any open cases? No   Discharge Plan A Home;Home Health   Discharge Plan B Home with family   Patient/Family In Agreement With Plan yes

## 2017-06-28 NOTE — PT/OT/SLP PROGRESS
Physical Therapy  Treatment    Lei Hsu   MRN: [id number]   Admitting Diagnosis: Status post total right knee replacement    PT Received On: 06/28/17  PT Start Time: 1100     PT Stop Time: 1130    PT Total Time (min): 30 min       Billable Minutes:  Gait [id number] and Therapeutic Activity 15    Treatment Type: Treatment  PT/PTA: PTA     PTA Visit Number: 1       General Precautions: Standard, fall  Orthopedic Precautions: RLE weight bearing as tolerated   Braces:           Subjective:  Communicated with nurse Harrell prior to session.  Pt agreeable to session, reporting pain and fatigue.    Pain/Comfort  Pain Rating 1: 6/10  Location - Side 1: Right  Location 1: knee  Pain Addressed 1: Reposition, Distraction, Cessation of Activity, Nurse notified    Objective:   Patient found with: oxygen, peripheral IV, Polar ice, perineural catheter, SCD    Functional Mobility:  Bed Mobility:   Supine to Sit: Moderate Assistance    Transfers:  Sit <> Stand Assistance: Moderate Assistance, Maximum Assistance (3 x's total with max cueing for safety and proper tech)  Sit <> Stand Assistive Device: Rolling Walker  Bed <> Chair Technique: Stand Pivot  Bed <> Chair Assistance: Moderate Assistance  Bed <> Chair Assistive Device: Rolling Walker  Toilet Transfer Technique: Stand Pivot  Toilet Transfer Assistance: Moderate Assistance  Toilet Transfer Assistive Device: Rolling Walker    Gait:   Gait Distance: 40' in hallway and 10' from chair to restroom  Assistance 1: Moderate assistance  Gait Assistive Device: Rolling walker  Gait Pattern: 3-point gait  Gait Deviation(s): decreased braydon, increased time in double stance, decreased velocity of limb motion, decreased step length, decreased stride length, decreased toe-to-floor clearance, decreased weight-shifting ability, forward lean, excessive knee flexion (increased forward lean, not attmpeting to correct depsite cueing)      Balance:   Static Sit: NORMAL: No deviations seen in  posture held statically  Dynamic Sit: NORMAL: No deviations seen in posture held dynamically  Static Stand: FAIR: Maintains without assist but unable to take challenges  Dynamic stand: POOR: N/A     Therapeutic Activities and Exercises:  Pt assisted to restroom, however no BM noted. Pt required Supervision for toileting 2' decreased safety awareness.          Patient left up in chair with all lines intact, call button in reach and nurse Sharri notified.    Assessment:  Lei Hsu is a 62 y.o. male with a medical diagnosis of Status post total right knee replacement and presents with pain and impaired activity tolerance limiting functional mobility. .    Rehab identified problem list/impairments: Rehab identified problem list/impairments: weakness, impaired endurance, impaired functional mobilty, gait instability, impaired balance, decreased lower extremity function, decreased safety awareness, pain, decreased ROM, impaired joint extensibility, orthopedic precautions    Rehab potential is fair.    Activity tolerance: Fair      GOALS:    Physical Therapy Goals        Problem: Physical Therapy Goal    Goal Priority Disciplines Outcome Goal Variances Interventions   Physical Therapy Goal    High PT/OT, PT      Description:  Goals to be met by: 2017     Patient will increase functional independence with mobility by performin. Supine to sit with Contact Guard Assistance  2. Sit to supine with Contact Guard Assistance  3. Sit to stand transfer with Contact Guard Assistance  4. Bed to chair transfer with Contact Guard Assistance using Rolling Walker  5. Gait  x 150 feet with Minimal Assistance using Rolling Walker.   6. Lower extremity exercise program x20 reps per handout, with assistance as needed                      PLAN:    Patient to be seen BID  to address the above listed problems via gait training, therapeutic activities, therapeutic exercises  Plan of Care expires: 17  Plan of Care reviewed  with: patient         Araceli Anne, PTA  06/28/2017

## 2017-06-29 VITALS
OXYGEN SATURATION: 93 % | HEART RATE: 72 BPM | TEMPERATURE: 99 F | SYSTOLIC BLOOD PRESSURE: 136 MMHG | BODY MASS INDEX: 34.54 KG/M2 | DIASTOLIC BLOOD PRESSURE: 81 MMHG | RESPIRATION RATE: 18 BRPM | HEIGHT: 72 IN | WEIGHT: 255 LBS

## 2017-06-29 LAB
ANION GAP SERPL CALC-SCNC: 7 MMOL/L
BASOPHILS # BLD AUTO: 0 K/UL
BASOPHILS NFR BLD: 0.6 %
BUN SERPL-MCNC: 14 MG/DL
CALCIUM SERPL-MCNC: 8.7 MG/DL
CHLORIDE SERPL-SCNC: 108 MMOL/L
CO2 SERPL-SCNC: 28 MMOL/L
CREAT SERPL-MCNC: 0.9 MG/DL
DIFFERENTIAL METHOD: ABNORMAL
EOSINOPHIL # BLD AUTO: 0 K/UL
EOSINOPHIL NFR BLD: 0.8 %
ERYTHROCYTE [DISTWIDTH] IN BLOOD BY AUTOMATED COUNT: 13.2 %
EST. GFR  (AFRICAN AMERICAN): >60 ML/MIN/1.73 M^2
EST. GFR  (NON AFRICAN AMERICAN): >60 ML/MIN/1.73 M^2
GLUCOSE SERPL-MCNC: 136 MG/DL
HCT VFR BLD AUTO: 31.9 %
HGB BLD-MCNC: 11 G/DL
LYMPHOCYTES # BLD AUTO: 1.6 K/UL
LYMPHOCYTES NFR BLD: 28.8 %
MCH RBC QN AUTO: 31.8 PG
MCHC RBC AUTO-ENTMCNC: 34.6 %
MCV RBC AUTO: 92 FL
MONOCYTES # BLD AUTO: 0.5 K/UL
MONOCYTES NFR BLD: 9.6 %
NEUTROPHILS # BLD AUTO: 3.4 K/UL
NEUTROPHILS NFR BLD: 60.2 %
PLATELET # BLD AUTO: 98 K/UL
PMV BLD AUTO: 8.9 FL
POTASSIUM SERPL-SCNC: 3.9 MMOL/L
RBC # BLD AUTO: 3.47 M/UL
SODIUM SERPL-SCNC: 143 MMOL/L
WBC # BLD AUTO: 5.7 K/UL

## 2017-06-29 PROCEDURE — 25000003 PHARM REV CODE 250: Performed by: INTERNAL MEDICINE

## 2017-06-29 PROCEDURE — 94761 N-INVAS EAR/PLS OXIMETRY MLT: CPT

## 2017-06-29 PROCEDURE — 85025 COMPLETE CBC W/AUTO DIFF WBC: CPT

## 2017-06-29 PROCEDURE — 36415 COLL VENOUS BLD VENIPUNCTURE: CPT

## 2017-06-29 PROCEDURE — 97530 THERAPEUTIC ACTIVITIES: CPT

## 2017-06-29 PROCEDURE — 80048 BASIC METABOLIC PNL TOTAL CA: CPT

## 2017-06-29 PROCEDURE — 94799 UNLISTED PULMONARY SVC/PX: CPT

## 2017-06-29 PROCEDURE — 25000003 PHARM REV CODE 250: Performed by: ORTHOPAEDIC SURGERY

## 2017-06-29 PROCEDURE — 99239 HOSP IP/OBS DSCHRG MGMT >30: CPT | Mod: ,,, | Performed by: INTERNAL MEDICINE

## 2017-06-29 PROCEDURE — 25000003 PHARM REV CODE 250: Performed by: ANESTHESIOLOGY

## 2017-06-29 PROCEDURE — 63600175 PHARM REV CODE 636 W HCPCS: Performed by: ORTHOPAEDIC SURGERY

## 2017-06-29 PROCEDURE — 97116 GAIT TRAINING THERAPY: CPT

## 2017-06-29 RX ORDER — ASPIRIN 325 MG
325 TABLET ORAL 2 TIMES DAILY
Refills: 0 | COMMUNITY
Start: 2017-06-29 | End: 2021-01-26 | Stop reason: DRUGHIGH

## 2017-06-29 RX ADMIN — ACETAMINOPHEN 1000 MG: 500 TABLET, FILM COATED ORAL at 05:06

## 2017-06-29 RX ADMIN — GABAPENTIN 300 MG: 300 CAPSULE ORAL at 05:06

## 2017-06-29 RX ADMIN — PRAVASTATIN SODIUM 20 MG: 10 TABLET ORAL at 08:06

## 2017-06-29 RX ADMIN — MUPIROCIN 1 G: 20 OINTMENT TOPICAL at 09:06

## 2017-06-29 RX ADMIN — STANDARDIZED SENNA CONCENTRATE AND DOCUSATE SODIUM 1 TABLET: 8.6; 5 TABLET, FILM COATED ORAL at 08:06

## 2017-06-29 RX ADMIN — HYDROMORPHONE HYDROCHLORIDE 0.5 MG: 2 INJECTION INTRAMUSCULAR; INTRAVENOUS; SUBCUTANEOUS at 01:06

## 2017-06-29 RX ADMIN — HYDROMORPHONE HYDROCHLORIDE 0.5 MG: 2 INJECTION INTRAMUSCULAR; INTRAVENOUS; SUBCUTANEOUS at 05:06

## 2017-06-29 RX ADMIN — METHOCARBAMOL 500 MG: 500 TABLET ORAL at 01:06

## 2017-06-29 RX ADMIN — GABAPENTIN 300 MG: 300 CAPSULE ORAL at 01:06

## 2017-06-29 RX ADMIN — ASPIRIN 325 MG ORAL TABLET 325 MG: 325 PILL ORAL at 08:06

## 2017-06-29 RX ADMIN — ACETAMINOPHEN 1000 MG: 500 TABLET, FILM COATED ORAL at 01:06

## 2017-06-29 RX ADMIN — ACETAMINOPHEN 1000 MG: 500 TABLET, FILM COATED ORAL at 12:06

## 2017-06-29 RX ADMIN — HYDROMORPHONE HYDROCHLORIDE 0.5 MG: 2 INJECTION INTRAMUSCULAR; INTRAVENOUS; SUBCUTANEOUS at 08:06

## 2017-06-29 RX ADMIN — HYDROMORPHONE HYDROCHLORIDE 0.5 MG: 2 INJECTION INTRAMUSCULAR; INTRAVENOUS; SUBCUTANEOUS at 02:06

## 2017-06-29 RX ADMIN — METHOCARBAMOL 500 MG: 500 TABLET ORAL at 05:06

## 2017-06-29 RX ADMIN — SODIUM CHLORIDE: 0.45 INJECTION, SOLUTION INTRAVENOUS at 05:06

## 2017-06-29 NOTE — DISCHARGE SUMMARY
Discharge Summary  Hospital Medicine    Admit Date: 6/27/2017    Date and Time: 6/29/20171:41 PM    Discharge Attending Physician: Nina Brewer MD    Primary Care Physician: Lei North MD    Diagnoses:  Active Hospital Problems    Diagnosis  POA    *Status post total right knee replacement [Z96.651]  Not Applicable    Osteoarthritis of right knee [M17.11]  Yes    BMI 35.0-35.9,adult [Z68.35]  Not Applicable    Tobacco use [Z72.0]  Yes    Hypercholesterolemia [E78.00]  Yes    Essential hypertension [I10]  Yes      Resolved Hospital Problems    Diagnosis Date Resolved POA   No resolved problems to display.     Discharged Condition: Good    Hospital Course:   Patient is a 62 y.o. male admitted to Hospitalist Service from Operation Room s/p right total knee arthroplasty p[erformed by Dr. Almonte. Patient reportedly has past medical history significant for OA, hypertension, hyperlipidemia and nicotine addiction. Post-operatively, patient denied chest pain, shortness of breath, abdominal pain, nausea, vomiting, headache, vision changes, focal neuro-deficits, cough or fever. Patient was admitted to Hospitalist medicine service. Patient was followed by Dr. Almonte. Post-operative, patient did well. Pain adequately controlled. Patient participated with physical therapy. Home health and home physical therapy has been arranged. Fall precautions discussed with the patient. Patient to follow up with primary care physician next week and orthopedic doctor in 2 weeks. Post-operative anti-coagulation as per orthopedics recommendations advised. In case of chest pain, shortness of breath, stroke or stroke like symptoms, high grade fever or any signs or symptoms of surgical site wound infection symptoms, patient to return to nearest emergency room as soon as possible. Patient was discharged home in stable condition with following discharge plan of care.   Total time with the patient was 30 minutes and greater than 50% was spent  in counseling and coordination of care. The assessment and plan have been discussed at length. Physicians' notes reviewed. Labs and procedure reviewed.     Consults: Dr. Almonte    Significant Diagnostic Studies:   Right knee X-Ray: There is right TKA appearing in good position and alignment without complicating factors.  The postoperative changes including a drain in the soft tissues    Microbiology Results (last 7 days)     ** No results found for the last 168 hours. **        Special Treatments/Procedures: None  Disposition: Home or Self Care    Medications:  Reconciled Home Medications: Current Discharge Medication List      START taking these medications    Details   aspirin 325 MG tablet Take 1 tablet (325 mg total) by mouth 2 (two) times daily.  Refills: 0         CONTINUE these medications which have NOT CHANGED    Details   diazePAM (VALIUM) 10 MG Tab Take 10 mg by mouth 2 (two) times daily.       gabapentin (NEURONTIN) 300 MG capsule Take 300 mg by mouth 3 (three) times daily.      ketoconazole (NIZORAL) 2 % cream APPLY TO AFFECTED AREA TOPICALLY ONCE DAILY  Refills: 2      oxycodone (ROXICODONE) 30 MG Tab Take 30 mg by mouth every 6 (six) hours as needed.       pravastatin (PRAVACHOL) 20 MG tablet Take 20 mg by mouth once daily.      diclofenac (VOLTAREN) 25 MG TbEC Take 75 mg by mouth 3 (three) times daily.         STOP taking these medications       aspirin (ECOTRIN) 81 MG EC tablet Comments:   Reason for Stopping:               Discharge Procedure Orders  Ambulatory referral to Home Health   Referral Priority: Routine Referral Type: Home Health   Referral Reason: Specialty Services Required    Requested Specialty: Home Health Services    Number of Visits Requested: 1      Diet general   Order Comments: Cardiac/ 2 gram sodium low cholesterol diet     Other restrictions (specify):   Order Comments: Fall precautions     Call MD for:   Order Comments: For worsening symptoms, chest pain, shortness of breath,  increased abdominal pain, high grade fever, stroke or stroke like symptoms, immediately go to the nearest Emergency Room or call 911 as soon as possible.       Follow-up Information     Lei Almonte Jr, MD On 7/12/2017.    Specialty:  Orthopedic Surgery  Why:  @1:30pm   Contact information:  [address] [address]  [phone]             Lei North MD In 1 week.    Specialty:  Family Medicine  Contact information:  [address] [facility] [id number] [phone]

## 2017-06-29 NOTE — PLAN OF CARE
Problem: Physical Therapy Goal  Goal: Physical Therapy Goal  Goals to be met by: 2017     Patient will increase functional independence with mobility by performin. Supine to sit with Contact Guard Assistance  2. Sit to supine with Contact Guard Assistance  3. Sit to stand transfer with Contact Guard Assistance  4. Bed to chair transfer with Contact Guard Assistance using Rolling Walker  5. Gait  x 150 feet with Minimal Assistance using Rolling Walker.   6. Lower extremity exercise program x20 reps per handout, with assistance as needed     Outcome: Ongoing (interventions implemented as appropriate)  PT for gait training, transfer training in AM and PM.

## 2017-06-29 NOTE — PROGRESS NOTES
[facility] sent patient information to [facility] through [facility] for processing and acceptance.[email or web address] Carmona      The referral for the patient in [facility], room 313, bed 313 A admitted at 6/27/2017 5:59 AM has been updated by [email or web address].  Update: Accepted.

## 2017-06-29 NOTE — PROGRESS NOTES
"[facility]  Orthopedics  Progress Note    Patient Name: Lei Hsu  MRN: [id number]  Admission Date: 6/27/2017  Hospital Length of Stay: 2 days  Attending Provider: Nina Brewer MD  Primary Care Provider: Lei North MD  Follow-up For: Procedure(s) (LRB):  ARTHROPLASTY-KNEE (Right)    Post-Operative Day: 2 Days Post-Op  Subjective:     Principal Problem:Status post total right knee replacement    Principal Orthopedic Problem:     Interval History:     Review of patient's allergies indicates:  No Known Allergies    Current Facility-Administered Medications   Medication    acetaminophen tablet 1,000 mg    [START ON 7/1/2017] acetaminophen tablet 650 mg    aspirin tablet 325 mg    diphenhydrAMINE injection 12.5 mg    gabapentin capsule 300 mg    hydromorphone (PF) injection 0.5 mg    methocarbamol tablet 500 mg    mupirocin 2 % ointment 1 g    ondansetron injection 4 mg    pravastatin tablet 20 mg    promethazine (PHENERGAN) 6.25 mg in dextrose 5 % 50 mL IVPB    ropivacaine (NAROPIN) 0.2% ON-Q C-BLOC 750mL (select a flow)    senna-docusate 8.6-50 mg per tablet 1 tablet     Objective:     Vital Signs (Most Recent):  Temp: 98.7 °F (37.1 °C) (06/29/17 1223)  Pulse: 72 (06/29/17 1223)  Resp: 18 (06/29/17 1223)  BP: 136/81 (06/29/17 1223)  SpO2: (!) 93 % (06/29/17 1223) Vital Signs (24h Range):  Temp:  [97.7 °F (36.5 °C)-98.8 °F (37.1 °C)] 98.7 °F (37.1 °C)  Pulse:  [65-73] 72  Resp:  [18-20] 18  SpO2:  [93 %-100 %] 93 %  BP: (115-139)/(63-81) 136/81     Weight: 115.7 kg (255 lb)  Height: 5' 11.5" (181.6 cm)  Body mass index is 35.07 kg/m².      Intake/Output Summary (Last 24 hours) at 06/29/17 1238  Last data filed at 06/29/17 0600   Gross per 24 hour   Intake             2160 ml   Output              250 ml   Net             1910 ml       Ortho/SPM Exam    Significant Labs:   CBC:   Recent Labs  Lab 06/28/17  0536 06/29/17  0548   WBC 7.30 5.70   HGB 11.6* 11.0*   HCT 33.9* 31.9* "   PLT 95* 98*       Significant Imaging: I have reviewed all pertinent imaging results/findings.    Assessment/Plan:     * Status post total right knee replacement    Pod 2,   Reasonable pain relief,,  Knee looks good,, n/v ok,, moder swell,, walks fair         Can go home later today  If walk better,,, has pain meds,,, rtc 2 weeks              Lei Almonte Jr, MD  Orthopedics  [facility]

## 2017-06-29 NOTE — PLAN OF CARE
Problem: Patient Care Overview  Goal: Plan of Care Review  Outcome: Ongoing (interventions implemented as appropriate)  Pt is on room air. IS done 10* 1300, with good effort.

## 2017-06-29 NOTE — ASSESSMENT & PLAN NOTE
Pod 2,   Reasonable pain relief,,  Knee looks good,, n/v ok,, moder swell,, walks fair         Can go home later today  If walk better,,, has pain meds,,, rtc 2 weeks

## 2017-06-29 NOTE — PT/OT/SLP PROGRESS
Physical Therapy  Treatment    Lei Hsu   MRN: [id number]   Admitting Diagnosis: Status post total right knee replacement    PT Received On: 06/29/17  PT Start Time: 1355     PT Stop Time: 1420    PT Total Time (min): 25 min       Billable Minutes:  Gait [id number]    Treatment Type: Treatment  PT/PTA: PTA     PTA Visit Number: 3       General Precautions: Standard, fall  Orthopedic Precautions: RLE weight bearing as tolerated   Braces:           Subjective:  Communicated with nurse Mcneil prior to session.  Pt agreeable to session, in hopes of going home after walking.     Pain/Comfort  Pain Rating 1:  (did not numerically rate)  Location - Side 1: Right  Location 1: knee  Pain Addressed 1: Distraction, Nurse notified, Pre-medicate for activity    Objective:   Patient found with: perineural catheter, peripheral IV    Functional Mobility:  Bed Mobility:   Supine to Sit: Stand by Assistance  Sit to Supine: Stand by Assistance    Transfers:  Sit <> Stand Assistance: Minimum Assistance  Sit <> Stand Assistive Device: Rolling Walker      Gait:   Gait Distance: 220' with brief standing rest breaks  Assistance 1: Minimum assistance, Contact Guard Assistance  Gait Assistive Device: Rolling walker  Gait Pattern: 3-point gait  Gait Deviation(s): decreased braydon, increased time in double stance, decreased velocity of limb motion, decreased step length, decreased toe-to-floor clearance, excessive knee flexion, forward lean      Balance:   Static Sit: NORMAL: No deviations seen in posture held statically  Dynamic Sit: NORMAL: No deviations seen in posture held dynamically  Static Stand: FAIR: Maintains without assist but unable to take challenges  Dynamic stand: FAIR: Needs CONTACT GUARD during gait         Patient left supine with all lines intact, call button in reach, nurse Mcneil notified and spouse present.    Assessment:  Lei Hsu is a 62 y.o. male with a medical diagnosis of Status post total right knee  replacement and presents with improved gait tolerance/distance this PM, inhipes of going home.    Rehab identified problem list/impairments: Rehab identified problem list/impairments: weakness, impaired endurance, impaired functional mobilty, gait instability, impaired balance, decreased lower extremity function, decreased safety awareness, pain, decreased ROM, orthopedic precautions    Rehab potential is good.    Activity tolerance: Fair        GOALS:    Physical Therapy Goals        Problem: Physical Therapy Goal    Goal Priority Disciplines Outcome Goal Variances Interventions   Physical Therapy Goal    High PT/OT, PT Ongoing (interventions implemented as appropriate)     Description:  Goals to be met by: 2017     Patient will increase functional independence with mobility by performin. Supine to sit with Contact Guard Assistance  2. Sit to supine with Contact Guard Assistance  3. Sit to stand transfer with Contact Guard Assistance  4. Bed to chair transfer with Contact Guard Assistance using Rolling Walker  5. Gait  x 150 feet with Minimal Assistance using Rolling Walker.   6. Lower extremity exercise program x20 reps per handout, with assistance as needed                      PLAN:    Patient to be seen BID  to address the above listed problems via gait training, therapeutic activities, therapeutic exercises  Plan of Care expires: 17  Plan of Care reviewed with: patient, spouse         Araceli Anne, PTA  2017

## 2017-06-29 NOTE — PROGRESS NOTES
[facility]  Orthopedics  Progress Note    Patient Name: Lei Hsu  MRN: [id number]  Admission Date: 6/27/2017  Hospital Length of Stay: 2 days  Attending Provider: Nina Brewer MD  Primary Care Provider: Lei North MD  Follow-up For: Procedure(s) (LRB):  ARTHROPLASTY-KNEE (Right)    Post-Operative Day: 2 Days Post-Op  Subjective:     No new subjective & objective note has been filed under this hospital service since the last note was generated.    Assessment/Plan:     * Status post total right knee replacement    Pod 2,   Reasonable pain relief,,  Knee looks good,, n/v ok,, moder swell,, walks fair         Can go home later today  If walk better,,, has pain meds,,, rtc 2 weeks              Lei Almonte Jr, MD  Orthopedics  [facility]

## 2017-06-29 NOTE — PLAN OF CARE
Problem: Patient Care Overview  Goal: Plan of Care Review  Hourly rounding utilized. Pt. Able to verbalize needs. POC discussed with pt. And his wife. Both verbalized understanding. No further questions at this time. Pt. Requesting to have his home medication Roxicodone 30mg q6 hr resumed. States that Dilaudid is not working for him. States that even the increase in Q-Bloc anesthesia has not helped. Pt. Does admit to having chronic back pain. Using cryotherapy.  Pt. Refusing SCD and to wear CPM. Educated and stressed importance of using them both. Pt. Verbalized understanding, to still chose not to wear. Tolerating regular diet. Reports BM on 6/28/17. Dressing to right knee, CDI. Remains free from injury. Call light in reach. Bed low and locked. SRX2.

## 2017-06-29 NOTE — PLAN OF CARE
Problem: Patient Care Overview  Goal: Plan of Care Review  Patient averaging 2250ml on IS.  Hr 70 and 02 saturation 94% on room air.

## 2017-06-29 NOTE — PT/OT/SLP PROGRESS
"Physical Therapy  Treatment    Lei Hsu   MRN: [id number]   Admitting Diagnosis: Status post total right knee replacement    PT Received On: 06/29/17  PT Start Time: 0908     PT Stop Time: 0934    PT Total Time (min): 26 min       Billable Minutes:  Gait [id number] and Therapeutic Activity 16    Treatment Type: Treatment  PT/PTA: PTA     PTA Visit Number: 2       General Precautions: Standard, fall  Orthopedic Precautions: RLE weight bearing as tolerated   Braces:           Subjective:  Communicated with nurse Mcneil prior to session.  Pt agreeable to session.     Pain/Comfort  Pain Rating 1: 5/10 ("It's a little less today")  Location - Side 1: Right  Location 1: knee  Pain Addressed 1: Pre-medicate for activity, Reposition, Cessation of Activity, Nurse notified, Distraction    Objective:   Patient found with: perineural catheter, peripheral IV, Polar ice    Functional Mobility:  Bed Mobility:   Supine to Sit: Minimum Assistance    Transfers:  Sit <> Stand Assistance: Moderate Assistance, Minimum Assistance  Sit <> Stand Assistive Device: Rolling Walker  Bed <> Chair Technique: Stand Pivot  Bed <> Chair Assistance: Minimum Assistance  Bed <> Chair Assistive Device: Rolling Walker  Toilet Transfer Technique: Squat Pivot  Toilet Transfer Assistance: Minimum Assistance  Toilet Transfer Assistive Device: Rolling Walker    Gait:   Gait Distance: 15 from bed to restroom; 20' and 35' in hallway with seated rest break in between   Assistance 1: Minimum assistance  Gait Assistive Device: Rolling walker  Gait Pattern: 3-point gait  Gait Deviation(s): decreased braydon, increased time in double stance, decreased velocity of limb motion, decreased step length, decreased stride length, decreased toe-to-floor clearance, excessive knee flexion, forward lean        Balance:   Static Sit: NORMAL: No deviations seen in posture held statically  Dynamic Sit: NORMAL: No deviations seen in posture held dynamically  Static Stand: " FAIR: Maintains without assist but unable to take challenges  Dynamic stand: FAIR: Needs CONTACT GUARD during gait     Therapeutic Activities and Exercises:  Pt assisted to restroom, SBA for toileting 2' pt's decreased safety awareness.     Pt performed BLE therex seated in chair: AP, LAQ (AAROM R), hip abd/add x 15 reps each    Patient left up in chair with all lines intact, call button in reach and nurse Tarun notified.    Assessment:  Lei Hsu is a 62 y.o. male with a medical diagnosis of Status post total right knee replacement and presents with pain and and overall impaired activity tolerance.    Rehab identified problem list/impairments: Rehab identified problem list/impairments: weakness, impaired endurance, impaired functional mobilty, gait instability, impaired balance, impaired cognition, decreased coordination, decreased lower extremity function, decreased safety awareness, pain, decreased ROM, orthopedic precautions    Rehab potential is fair.    Activity tolerance: Fair      GOALS:    Physical Therapy Goals        Problem: Physical Therapy Goal    Goal Priority Disciplines Outcome Goal Variances Interventions   Physical Therapy Goal    High PT/OT, PT Ongoing (interventions implemented as appropriate)     Description:  Goals to be met by: 2017     Patient will increase functional independence with mobility by performin. Supine to sit with Contact Guard Assistance  2. Sit to supine with Contact Guard Assistance  3. Sit to stand transfer with Contact Guard Assistance  4. Bed to chair transfer with Contact Guard Assistance using Rolling Walker  5. Gait  x 150 feet with Minimal Assistance using Rolling Walker.   6. Lower extremity exercise program x20 reps per handout, with assistance as needed                      PLAN:    Patient to be seen BID  to address the above listed problems via gait training, therapeutic activities, therapeutic exercises  Plan of Care expires: 17  Plan of  Care reviewed with: patient         Araceli Anne, PTA  06/29/2017

## 2017-06-29 NOTE — SUBJECTIVE & OBJECTIVE
"Principal Problem:Status post total right knee replacement    Principal Orthopedic Problem:     Interval History:     Review of patient's allergies indicates:  No Known Allergies    Current Facility-Administered Medications   Medication    acetaminophen tablet 1,000 mg    [START ON 7/1/2017] acetaminophen tablet 650 mg    aspirin tablet 325 mg    diphenhydrAMINE injection 12.5 mg    gabapentin capsule 300 mg    hydromorphone (PF) injection 0.5 mg    methocarbamol tablet 500 mg    mupirocin 2 % ointment 1 g    ondansetron injection 4 mg    pravastatin tablet 20 mg    promethazine (PHENERGAN) 6.25 mg in dextrose 5 % 50 mL IVPB    ropivacaine (NAROPIN) 0.2% ON-Q C-BLOC 750mL (select a flow)    senna-docusate 8.6-50 mg per tablet 1 tablet     Objective:     Vital Signs (Most Recent):  Temp: 98.7 °F (37.1 °C) (06/29/17 1223)  Pulse: 72 (06/29/17 1223)  Resp: 18 (06/29/17 1223)  BP: 136/81 (06/29/17 1223)  SpO2: (!) 93 % (06/29/17 1223) Vital Signs (24h Range):  Temp:  [97.7 °F (36.5 °C)-98.8 °F (37.1 °C)] 98.7 °F (37.1 °C)  Pulse:  [65-73] 72  Resp:  [18-20] 18  SpO2:  [93 %-100 %] 93 %  BP: (115-139)/(63-81) 136/81     Weight: 115.7 kg (255 lb)  Height: 5' 11.5" (181.6 cm)  Body mass index is 35.07 kg/m².      Intake/Output Summary (Last 24 hours) at 06/29/17 1238  Last data filed at 06/29/17 0600   Gross per 24 hour   Intake             2160 ml   Output              250 ml   Net             1910 ml       Ortho/SPM Exam    Significant Labs:   CBC:   Recent Labs  Lab 06/28/17  0536 06/29/17  0548   WBC 7.30 5.70   HGB 11.6* 11.0*   HCT 33.9* 31.9*   PLT 95* 98*       Significant Imaging: I have reviewed all pertinent imaging results/findings.  "

## 2017-06-29 NOTE — ANESTHESIA POST-OP PAIN MANAGEMENT
Acute Pain Service Progress Note    Lei Hsu is a 62 y.o., male, [id number].    Surgery:  Right knee arthroplasty     Post Op Day #: 2    Catheter type: perineural  adductor canal     Infusion type: Ropivacaine 0.2%  8 basal    Problem List:    Active Hospital Problems    Diagnosis  POA    *Status post total right knee replacement [Z96.651]  Not Applicable    Osteoarthritis of right knee [M17.11]  Yes    BMI 35.0-35.9,adult [Z68.35]  Not Applicable    Tobacco use [Z72.0]  Yes    Hypercholesterolemia [E78.00]  Yes    Essential hypertension [I10]  Yes      Resolved Hospital Problems    Diagnosis Date Resolved POA   No resolved problems to display.       Subjective:     General appearance of alert, oriented, no complaints   Pain with rest: 3        Pain with movement: 4        Side Effects    1. Pruritis No    2. Nausea No    3. Motor Blockade No,    4. Sedation No,    Objective:        Catheter site clean, dry, intact         Vitals   Vitals:    06/29/17 1223   BP: 136/81   Pulse: 72   Resp: 18   Temp: 37.1 °C (98.7 °F)        Labs    Admission on 06/27/2017   Component Date Value Ref Range Status    WBC 06/28/2017 7.30  3.90 - 12.70 K/uL Final    RBC 06/28/2017 3.69* 4.60 - 6.20 M/uL Final    Hemoglobin 06/28/2017 11.6* 14.0 - 18.0 g/dL Final    Hematocrit 06/28/2017 33.9* 40.0 - 54.0 % Final    MCV 06/28/2017 92  82 - 98 fL Final    MCH 06/28/2017 31.5* 27.0 - 31.0 pg Final    MCHC 06/28/2017 34.3  32.0 - 36.0 % Final    RDW 06/28/2017 12.9  11.5 - 14.5 % Final    Platelets 06/28/2017 95* 150 - 350 K/uL Final    MPV 06/28/2017 9.1* 9.2 - 12.9 fL Final    Gran # 06/28/2017 5.2  1.8 - 7.7 K/uL Final    Lymph # 06/28/2017 1.5  1.0 - 4.8 K/uL Final    Mono # 06/28/2017 0.6  0.3 - 1.0 K/uL Final    Eos # 06/28/2017 0.0  0.0 - 0.5 K/uL Final    Baso # 06/28/2017 0.10  0.00 - 0.20 K/uL Final    Gran% 06/28/2017 70.3  38.0 - 73.0 % Final    Lymph% 06/28/2017 20.9  18.0 - 48.0 % Final    Mono%  06/28/2017 7.9  4.0 - 15.0 % Final    Eosinophil% 06/28/2017 0.2  0.0 - 8.0 % Final    Basophil% 06/28/2017 0.7  0.0 - 1.9 % Final    Differential Method 06/28/2017 Automated   Final    Sodium 06/28/2017 139  136 - 145 mmol/L Final    Potassium 06/28/2017 4.6  3.5 - 5.1 mmol/L Final    Chloride 06/28/2017 104  95 - 110 mmol/L Final    CO2 06/28/2017 27  23 - 29 mmol/L Final    Glucose 06/28/2017 206* 70 - 110 mg/dL Final    BUN, Bld 06/28/2017 18  8 - 23 mg/dL Final    Creatinine 06/28/2017 1.1  0.5 - 1.4 mg/dL Final    Calcium 06/28/2017 8.7  8.7 - 10.5 mg/dL Final    Anion Gap 06/28/2017 8  8 - 16 mmol/L Final    eGFR if  06/28/2017 >60  >60 mL/min/1.73 m^2 Final    eGFR if non African American 06/28/2017 >60  >60 mL/min/1.73 m^2 Final    WBC 06/29/2017 5.70  3.90 - 12.70 K/uL Final    RBC 06/29/2017 3.47* 4.60 - 6.20 M/uL Final    Hemoglobin 06/29/2017 11.0* 14.0 - 18.0 g/dL Final    Hematocrit 06/29/2017 31.9* 40.0 - 54.0 % Final    MCV 06/29/2017 92  82 - 98 fL Final    MCH 06/29/2017 31.8* 27.0 - 31.0 pg Final    MCHC 06/29/2017 34.6  32.0 - 36.0 % Final    RDW 06/29/2017 13.2  11.5 - 14.5 % Final    Platelets 06/29/2017 98* 150 - 350 K/uL Final    MPV 06/29/2017 8.9* 9.2 - 12.9 fL Final    Gran # 06/29/2017 3.4  1.8 - 7.7 K/uL Final    Lymph # 06/29/2017 1.6  1.0 - 4.8 K/uL Final    Mono # 06/29/2017 0.5  0.3 - 1.0 K/uL Final    Eos # 06/29/2017 0.0  0.0 - 0.5 K/uL Final    Baso # 06/29/2017 0.00  0.00 - 0.20 K/uL Final    Gran% 06/29/2017 60.2  38.0 - 73.0 % Final    Lymph% 06/29/2017 28.8  18.0 - 48.0 % Final    Mono% 06/29/2017 9.6  4.0 - 15.0 % Final    Eosinophil% 06/29/2017 0.8  0.0 - 8.0 % Final    Basophil% 06/29/2017 0.6  0.0 - 1.9 % Final    Differential Method 06/29/2017 Automated   Final    Sodium 06/29/2017 143  136 - 145 mmol/L Final    Potassium 06/29/2017 3.9  3.5 - 5.1 mmol/L Final    Chloride 06/29/2017 108  95 - 110 mmol/L Final    CO2  06/29/2017 28  23 - 29 mmol/L Final    Glucose 06/29/2017 136* 70 - 110 mg/dL Final    BUN, Bld 06/29/2017 14  8 - 23 mg/dL Final    Creatinine 06/29/2017 0.9  0.5 - 1.4 mg/dL Final    Calcium 06/29/2017 8.7  8.7 - 10.5 mg/dL Final    Anion Gap 06/29/2017 7* 8 - 16 mmol/L Final    eGFR if African American 06/29/2017 >60  >60 mL/min/1.73 m^2 Final    eGFR if non African American 06/29/2017 >60  >60 mL/min/1.73 m^2 Final        Meds   Current Facility-Administered Medications   Medication Dose Route Frequency Provider Last Rate Last Dose    acetaminophen tablet 1,000 mg  1,000 mg Oral Q6H Shaun Gutierrez II, MD   1,000 mg at 06/29/17 0534    [START ON 7/1/2017] acetaminophen tablet 650 mg  650 mg Oral Q6H Shaun Gutierrez II, MD        aspirin tablet 325 mg  325 mg Oral BID Aqib MD Daniella   325 mg at 06/29/17 0857    diphenhydrAMINE injection 12.5 mg  12.5 mg Intravenous PRN Shaun Gutierrez II, MD        gabapentin capsule 300 mg  300 mg Oral TID Lei Almonte Jr., MD   300 mg at 06/29/17 0534    hydromorphone (PF) injection 0.5 mg  0.5 mg Intravenous Q3H PRN Lei Almonte Jr., MD   0.5 mg at 06/29/17 1328    methocarbamol tablet 500 mg  500 mg Oral TID Shaun Gutierrez II, MD   500 mg at 06/29/17 0534    mupirocin 2 % ointment 1 g  1 g Nasal BID Lei Almonte Jr., MD   1 g at 06/29/17 0900    ondansetron injection 4 mg  4 mg Intravenous Q12H PRN Shaun Gutierrez II, MD        pravastatin tablet 20 mg  20 mg Oral Daily Lei Almonte Jr., MD   20 mg at 06/29/17 0857    promethazine (PHENERGAN) 6.25 mg in dextrose 5 % 50 mL IVPB  6.25 mg Intravenous Q6H PRN Shaun Gutierrez II, MD        ropivacaine (NAROPIN) 0.2% ON-Q C-BLOC 750mL (select a flow)   Perineural Continuous Shaun Gutierrez II, MD 8 mL/hr at 06/27/17 1053      senna-docusate 8.6-50 mg per tablet 1 tablet  1 tablet Oral BID Lei Almonte Jr., MD   1 tablet at 06/29/17 0882          Assessment:     Pain control  adequate    Plan:     Patient doing well, continue present treatment. Will discontinue catheter tomorrow.      Evaluator Joe Pollock

## 2017-06-29 NOTE — PT/OT/SLP PROGRESS
"Occupational Therapy      Lei Hsu  MRN: [id number]    Patient not seen today secondary to  (wife saw therapist in elevator and stated, " Don't even think about going in room 323, we are leaving.").      ARIA Genao  6/29/2017  "

## 2017-06-30 ENCOUNTER — PATIENT OUTREACH (OUTPATIENT)
Dept: ADMINISTRATIVE | Facility: CLINIC | Age: 62
End: 2017-06-30

## 2017-06-30 NOTE — ADDENDUM NOTE
Addendum  created 06/30/17 0976 by Kun Saavedra MD    Anesthesia Event edited, Sign clinical note

## 2017-06-30 NOTE — ANESTHESIA POST-OP PAIN MANAGEMENT
Acute Pain Service Progress Note    Lei Hsu is a 62 y.o., male, [id number].    Surgery:  Knee arthroplasty    Post Op Day #: 3    Catheter type: perineural  femoral    Infusion type: Ropivacaine 0.2%  8 basal    Problem List:    Active Hospital Problems    Diagnosis  POA    *Status post total right knee replacement [Z96.651]  Not Applicable    Osteoarthritis of right knee [M17.11]  Yes    BMI 35.0-35.9,adult [Z68.35]  Not Applicable    Tobacco use [Z72.0]  Yes    Hypercholesterolemia [E78.00]  Yes    Essential hypertension [I10]  Yes      Resolved Hospital Problems    Diagnosis Date Resolved POA   No resolved problems to display.       Subjective:     General appearance of alert, oriented, no complaints   Pain with rest: 2    Numbers   Pain with movement: 2    Numbers   Side Effects    1. Pruritis No    2. Nausea No    3. Motor Blockade No, 0=Ability to raise lower extremities off bed    4. Sedation No, 1=awake and alert    Objective:     Catheter level    Catheter site removed with tip intact   Catheter placement   Vitals   Vitals:    06/29/17 1223   BP: 136/81   Pulse: 72   Resp: 18   Temp: 37.1 °C (98.7 °F)        Labs    Admission on 06/27/2017, Discharged on 06/29/2017   Component Date Value Ref Range Status    WBC 06/28/2017 7.30  3.90 - 12.70 K/uL Final    RBC 06/28/2017 3.69* 4.60 - 6.20 M/uL Final    Hemoglobin 06/28/2017 11.6* 14.0 - 18.0 g/dL Final    Hematocrit 06/28/2017 33.9* 40.0 - 54.0 % Final    MCV 06/28/2017 92  82 - 98 fL Final    MCH 06/28/2017 31.5* 27.0 - 31.0 pg Final    MCHC 06/28/2017 34.3  32.0 - 36.0 % Final    RDW 06/28/2017 12.9  11.5 - 14.5 % Final    Platelets 06/28/2017 95* 150 - 350 K/uL Final    MPV 06/28/2017 9.1* 9.2 - 12.9 fL Final    Gran # 06/28/2017 5.2  1.8 - 7.7 K/uL Final    Lymph # 06/28/2017 1.5  1.0 - 4.8 K/uL Final    Mono # 06/28/2017 0.6  0.3 - 1.0 K/uL Final    Eos # 06/28/2017 0.0  0.0 - 0.5 K/uL Final    Baso # 06/28/2017 0.10  0.00 -  0.20 K/uL Final    Gran% 06/28/2017 70.3  38.0 - 73.0 % Final    Lymph% 06/28/2017 20.9  18.0 - 48.0 % Final    Mono% 06/28/2017 7.9  4.0 - 15.0 % Final    Eosinophil% 06/28/2017 0.2  0.0 - 8.0 % Final    Basophil% 06/28/2017 0.7  0.0 - 1.9 % Final    Differential Method 06/28/2017 Automated   Final    Sodium 06/28/2017 139  136 - 145 mmol/L Final    Potassium 06/28/2017 4.6  3.5 - 5.1 mmol/L Final    Chloride 06/28/2017 104  95 - 110 mmol/L Final    CO2 06/28/2017 27  23 - 29 mmol/L Final    Glucose 06/28/2017 206* 70 - 110 mg/dL Final    BUN, Bld 06/28/2017 18  8 - 23 mg/dL Final    Creatinine 06/28/2017 1.1  0.5 - 1.4 mg/dL Final    Calcium 06/28/2017 8.7  8.7 - 10.5 mg/dL Final    Anion Gap 06/28/2017 8  8 - 16 mmol/L Final    eGFR if  06/28/2017 >60  >60 mL/min/1.73 m^2 Final    eGFR if non African American 06/28/2017 >60  >60 mL/min/1.73 m^2 Final    WBC 06/29/2017 5.70  3.90 - 12.70 K/uL Final    RBC 06/29/2017 3.47* 4.60 - 6.20 M/uL Final    Hemoglobin 06/29/2017 11.0* 14.0 - 18.0 g/dL Final    Hematocrit 06/29/2017 31.9* 40.0 - 54.0 % Final    MCV 06/29/2017 92  82 - 98 fL Final    MCH 06/29/2017 31.8* 27.0 - 31.0 pg Final    MCHC 06/29/2017 34.6  32.0 - 36.0 % Final    RDW 06/29/2017 13.2  11.5 - 14.5 % Final    Platelets 06/29/2017 98* 150 - 350 K/uL Final    MPV 06/29/2017 8.9* 9.2 - 12.9 fL Final    Gran # 06/29/2017 3.4  1.8 - 7.7 K/uL Final    Lymph # 06/29/2017 1.6  1.0 - 4.8 K/uL Final    Mono # 06/29/2017 0.5  0.3 - 1.0 K/uL Final    Eos # 06/29/2017 0.0  0.0 - 0.5 K/uL Final    Baso # 06/29/2017 0.00  0.00 - 0.20 K/uL Final    Gran% 06/29/2017 60.2  38.0 - 73.0 % Final    Lymph% 06/29/2017 28.8  18.0 - 48.0 % Final    Mono% 06/29/2017 9.6  4.0 - 15.0 % Final    Eosinophil% 06/29/2017 0.8  0.0 - 8.0 % Final    Basophil% 06/29/2017 0.6  0.0 - 1.9 % Final    Differential Method 06/29/2017 Automated   Final    Sodium 06/29/2017 143  136 - 145  mmol/L Final    Potassium 06/29/2017 3.9  3.5 - 5.1 mmol/L Final    Chloride 06/29/2017 108  95 - 110 mmol/L Final    CO2 06/29/2017 28  23 - 29 mmol/L Final    Glucose 06/29/2017 136* 70 - 110 mg/dL Final    BUN, Bld 06/29/2017 14  8 - 23 mg/dL Final    Creatinine 06/29/2017 0.9  0.5 - 1.4 mg/dL Final    Calcium 06/29/2017 8.7  8.7 - 10.5 mg/dL Final    Anion Gap 06/29/2017 7* 8 - 16 mmol/L Final    eGFR if African American 06/29/2017 >60  >60 mL/min/1.73 m^2 Final    eGFR if non African American 06/29/2017 >60  >60 mL/min/1.73 m^2 Final        Meds   No current facility-administered medications for this encounter.      Current Outpatient Prescriptions   Medication Sig Dispense Refill    diazePAM (VALIUM) 10 MG Tab Take 10 mg by mouth 2 (two) times daily.       gabapentin (NEURONTIN) 300 MG capsule Take 300 mg by mouth 3 (three) times daily.      ketoconazole (NIZORAL) 2 % cream APPLY TO AFFECTED AREA TOPICALLY ONCE DAILY  2    oxycodone (ROXICODONE) 30 MG Tab Take 30 mg by mouth every 6 (six) hours as needed.       pravastatin (PRAVACHOL) 20 MG tablet Take 20 mg by mouth once daily.      aspirin 325 MG tablet Take 1 tablet (325 mg total) by mouth 2 (two) times daily.  0    diclofenac (VOLTAREN) 25 MG TbEC Take 75 mg by mouth 3 (three) times daily.          Anticoagulant dose    Assessment:     Pain control adequate    Plan:     Patient doing well, continue present treatment.    Evaluator Kun Saavedra

## 2017-06-30 NOTE — PLAN OF CARE
06/30/17 1632   Final Note   Assessment Type Final Discharge Note   Discharge Disposition Home-Health   Discharge planning education complete? Yes

## 2017-07-13 NOTE — OP NOTE
DATE OF PROCEDURE:  06/27/2017    PROCEDURE:  Total knee arthroplasty, right.    PREOPERATIVE DIAGNOSIS:  Severe osteoarthritis of the right knee.    POSTOPERATIVE DIAGNOSIS:  Severe osteoarthritis of the right knee.    ANESTHESIA:  General.    SURGEON:  Lei Almonte Jr., M.D.    ASSISTANT:  Yash Zamarripa.    PROCEDURE IN DETAIL:  The patient was taken to the Operating Room and placed   under spinal anesthesia.  We then prepped and draped right leg in satisfactory   manner, exsanguinated with the Esmarch, elevated the tourniquet to 300.  I did   use a leg wynne.  Preoperatively, he had a very impressive flexion contracture   of 15 to 20 degrees.  We made a medial parapatellar incision using a prior   medial scar, opened the skin and subcutaneous, dissected across the extensor   mechanism, did our medial arthrotomy and then started to exposing the joint.  He   had a lot of synovitis.  He had lots of spurring and loose bodies throughout   the knee and we removed lots of loose bodies.  Once we were able to mobilize the   patella, we then did a rough cut on the patella, trying to get some of that out   of the way, lot of spurring around the patella.  We then exposed our tibia, big   spurring on the tibia, particularly medially.  This was basically a varus knee,   got our retractors in, could not see the tibia.  We put on our external tibial   cutting guide and made our proximal tibial cut, very good bone on the medial   side.  We then removed that big wafer of bone and worked our way to the femur,   again a lot of spurring all around the femur and even in the notch we removed   spurs as needed as we went along.  We put our intramedullary guide up the femur   and then attached our anterior referencing cut, made our anterior cut on the   femur and it came out satisfactorily.  We then made our distal femoral cut at 4   degrees with a 10 mm block, that came out nicely, good bone stock on the femur.    We then again  removed spurs as need be to size it up.  We sized that femur to a   size 7, [place] Triathlon, put on the finishing block and made our anterior and   posterior and chamfer cuts.  We then went posteriorly and removed some more of   the remaining menisci and then went around medially and released some of the   medial and posterior corner, which were very tight and also took off some of the   medial spurring on the tibia.  All this allowed us to mobilize that medial   corner.  A lot of loose bodies and lot of spurring encountered.  We also did   some releasing posterior and laterally, but then we were finally able to get a   good look at the tibia, cleaned it up, sized it to a 7, put on a baseplate, we   got our keel punch and then we did a trial reduction.  The size 7 femur fitted   out very nicely; 11 mm poly was a little tight in both flexion and extension.    So, I went back to a 9 mm poly, that gave us good extension and good flexion,   seemed to be balanced, medial side now was actually a little loose within the   lateral side as we had with all that releasing but I thought it was very   satisfactory, got an AP x-ray to show that the overall alignment was   satisfactory and the position of the implants was good.  Once we were satisfied   with all of that and all that cleaned up debris posteriorly, loose bodies, etc.,   we then finished our patella and sized it to a 38 which fit very nicely, 3   holes for cemented patella.  We then removed all of our trials, irrigated, put   some bone in the femoral canal and cemented on our tibia size 7, put a 9 mm,   size 7 X3 poly Linton deep dish cruciate retaining implant on the plate and   then put a Pressfit femur size 7, cruciate retaining and cemented on our 38 mm   oblong patella.  Excess cement removed.  We irrigated when we finished.  I   thought the stability of the knee and the position of the knee looked quite nice   when finished.  We then closed our medial  retinaculum with #1 Vicryl, replaced   the VMO with #2 Tycron.  Nature of the extensor mechanism was okay on the way   out, left 1 Jac-Martinez drain inside the knee, brought out through a separate   stab wound and our tourniquet was down as we were closing our retinaculum.  We   then closed sub-Q with 2-0 Vicryl and closed the skin with running subcuticular   Vicryl and Steri-Strips.  He was then dressed, reversed from anesthesia, taken   to the Recovery Room in satisfactory condition, tolerated the procedure well.    Blood loss about 150 mL, 1 drain was left.      SWAPNIL  dd: 06/27/2017 10:05:17 (CDT)  td: 07/13/2017 01:07:15 (CDT)  Doc ID   #[id number]  Job ID #[id number]    CC:

## 2020-12-14 ENCOUNTER — TELEPHONE (OUTPATIENT)
Dept: FAMILY MEDICINE | Facility: CLINIC | Age: 65
End: 2020-12-14

## 2020-12-14 NOTE — TELEPHONE ENCOUNTER
Called patient on home number and mobile number. Home number - unable to contact pt or leave voice message.   Mobile number - forwarded to ; left voice message requesting phone call back regarding recent paperwork inquiry.

## 2020-12-14 NOTE — TELEPHONE ENCOUNTER
Looked through patient's chart in [place] for document in question, no document to be found. Called patient for further clarification - no answer; unable to leave voice message. There is no document at  in [facility] under the patient's name, either.

## 2020-12-14 NOTE — TELEPHONE ENCOUNTER
Patient brought additional copy of form to office for Dr. North to complete. Lelo is taking care of form completion.

## 2020-12-14 NOTE — TELEPHONE ENCOUNTER
"----- Message from Peyton Calderon sent at 12/14/2020  9:42 AM CST -----  Contact: self/[phone]  Pt called in regards to checking the status of his life insurance paper work. Pt dropped the paper work off the to check out desk.  Pt would like a call back "     Please advise    "

## 2021-01-26 ENCOUNTER — OFFICE VISIT (OUTPATIENT)
Dept: CARDIOLOGY | Facility: CLINIC | Age: 66
End: 2021-01-26
Payer: MEDICARE

## 2021-01-26 VITALS
DIASTOLIC BLOOD PRESSURE: 74 MMHG | RESPIRATION RATE: 16 BRPM | BODY MASS INDEX: 35.21 KG/M2 | HEIGHT: 72 IN | WEIGHT: 260 LBS | OXYGEN SATURATION: 96 % | SYSTOLIC BLOOD PRESSURE: 120 MMHG | HEART RATE: 77 BPM

## 2021-01-26 DIAGNOSIS — I25.10 CORONARY ARTERY DISEASE, ANGINA PRESENCE UNSPECIFIED, UNSPECIFIED VESSEL OR LESION TYPE, UNSPECIFIED WHETHER NATIVE OR TRANSPLANTED HEART: ICD-10-CM

## 2021-01-26 DIAGNOSIS — E11.9 TYPE 2 DIABETES MELLITUS WITHOUT COMPLICATION, WITHOUT LONG-TERM CURRENT USE OF INSULIN: ICD-10-CM

## 2021-01-26 DIAGNOSIS — I10 ESSENTIAL HYPERTENSION: ICD-10-CM

## 2021-01-26 DIAGNOSIS — E78.00 HYPERCHOLESTEROLEMIA: ICD-10-CM

## 2021-01-26 PROCEDURE — 99214 OFFICE O/P EST MOD 30 MIN: CPT | Mod: S$GLB,,, | Performed by: NURSE PRACTITIONER

## 2021-01-26 PROCEDURE — 99214 PR OFFICE/OUTPT VISIT, EST, LEVL IV, 30-39 MIN: ICD-10-PCS | Mod: S$GLB,,, | Performed by: NURSE PRACTITIONER

## 2021-01-26 RX ORDER — MORPHINE SULFATE 15 MG/1
TABLET, FILM COATED, EXTENDED RELEASE ORAL
COMMUNITY
Start: 2020-11-07 | End: 2021-03-18 | Stop reason: DRUGHIGH

## 2021-01-26 RX ORDER — ROSUVASTATIN CALCIUM 5 MG/1
TABLET, COATED ORAL
COMMUNITY
Start: 2020-10-21 | End: 2021-02-18 | Stop reason: SDUPTHER

## 2021-01-26 RX ORDER — METFORMIN HYDROCHLORIDE 500 MG/1
TABLET, EXTENDED RELEASE ORAL
COMMUNITY
Start: 2021-01-16 | End: 2021-02-18 | Stop reason: SDUPTHER

## 2021-01-26 RX ORDER — ASPIRIN 81 MG/1
81 TABLET ORAL 2 TIMES DAILY
COMMUNITY

## 2021-02-18 ENCOUNTER — LAB VISIT (OUTPATIENT)
Dept: LAB | Facility: CLINIC | Age: 66
End: 2021-02-18
Payer: MEDICARE

## 2021-02-18 ENCOUNTER — OFFICE VISIT (OUTPATIENT)
Dept: FAMILY MEDICINE | Facility: CLINIC | Age: 66
End: 2021-02-18
Payer: MEDICARE

## 2021-02-18 VITALS
TEMPERATURE: 97 F | OXYGEN SATURATION: 97 % | WEIGHT: 264.19 LBS | SYSTOLIC BLOOD PRESSURE: 130 MMHG | DIASTOLIC BLOOD PRESSURE: 80 MMHG | BODY MASS INDEX: 35.78 KG/M2 | HEIGHT: 72 IN | HEART RATE: 89 BPM

## 2021-02-18 DIAGNOSIS — E11.21 DIABETIC NEPHROPATHY ASSOCIATED WITH TYPE 2 DIABETES MELLITUS: ICD-10-CM

## 2021-02-18 DIAGNOSIS — I25.10 CORONARY ARTERY DISEASE, ANGINA PRESENCE UNSPECIFIED, UNSPECIFIED VESSEL OR LESION TYPE, UNSPECIFIED WHETHER NATIVE OR TRANSPLANTED HEART: ICD-10-CM

## 2021-02-18 DIAGNOSIS — E11.51 PERIPHERAL VASCULAR DISORDER DUE TO DIABETES MELLITUS: Primary | ICD-10-CM

## 2021-02-18 DIAGNOSIS — E78.00 HYPERCHOLESTEROLEMIA: Primary | ICD-10-CM

## 2021-02-18 DIAGNOSIS — I10 ESSENTIAL HYPERTENSION: ICD-10-CM

## 2021-02-18 PROBLEM — E78.2 MIXED HYPERLIPIDEMIA: Status: ACTIVE | Noted: 2017-04-18

## 2021-02-18 PROBLEM — Z95.5 S/P CORONARY ARTERY STENT PLACEMENT: Status: ACTIVE | Noted: 2019-04-08

## 2021-02-18 PROBLEM — G89.29 CHRONIC PAIN: Status: ACTIVE | Noted: 2017-07-20

## 2021-02-18 PROBLEM — M19.90 OSTEOARTHRITIS: Status: ACTIVE | Noted: 2021-02-18

## 2021-02-18 PROBLEM — E66.9 OBESITY WITH BODY MASS INDEX 30 OR GREATER: Status: ACTIVE | Noted: 2017-04-19

## 2021-02-18 LAB
ALBUMIN SERPL BCP-MCNC: 4.3 G/DL (ref 3.5–5.2)
ALP SERPL-CCNC: 179 U/L (ref 55–135)
ALT SERPL W/O P-5'-P-CCNC: 31 U/L (ref 10–44)
ANION GAP SERPL CALC-SCNC: 12 MMOL/L (ref 8–16)
AST SERPL-CCNC: 27 U/L (ref 10–40)
BASOPHILS # BLD AUTO: 0.08 K/UL (ref 0–0.2)
BASOPHILS NFR BLD: 0.9 % (ref 0–1.9)
BILIRUB SERPL-MCNC: 0.5 MG/DL (ref 0.1–1)
BUN SERPL-MCNC: 21 MG/DL (ref 8–23)
CALCIUM SERPL-MCNC: 8.8 MG/DL (ref 8.7–10.5)
CHLORIDE SERPL-SCNC: 106 MMOL/L (ref 95–110)
CO2 SERPL-SCNC: 26 MMOL/L (ref 23–29)
CREAT SERPL-MCNC: 1.4 MG/DL (ref 0.5–1.4)
DIFFERENTIAL METHOD: ABNORMAL
EOSINOPHIL # BLD AUTO: 0.1 K/UL (ref 0–0.5)
EOSINOPHIL NFR BLD: 1.1 % (ref 0–8)
ERYTHROCYTE [DISTWIDTH] IN BLOOD BY AUTOMATED COUNT: 14.6 % (ref 11.5–14.5)
EST. GFR  (AFRICAN AMERICAN): >60 ML/MIN/1.73 M^2
EST. GFR  (NON AFRICAN AMERICAN): 52 ML/MIN/1.73 M^2
GLUCOSE SERPL-MCNC: 148 MG/DL (ref 70–110)
GLUCOSE SERPL-MCNC: 150 MG/DL (ref 70–110)
HBA1C MFR BLD: 7.3 %
HBA1C MFR BLD: 7.3 % (ref 4–6)
HCT VFR BLD AUTO: 40.7 % (ref 40–54)
HGB BLD-MCNC: 11.7 G/DL (ref 14–18)
IMM GRANULOCYTES # BLD AUTO: 0.02 K/UL (ref 0–0.04)
IMM GRANULOCYTES NFR BLD AUTO: 0.2 % (ref 0–0.5)
LYMPHOCYTES # BLD AUTO: 2 K/UL (ref 1–4.8)
LYMPHOCYTES NFR BLD: 22.6 % (ref 18–48)
MCH RBC QN AUTO: 23.5 PG (ref 27–31)
MCHC RBC AUTO-ENTMCNC: 28.7 G/DL (ref 32–36)
MCV RBC AUTO: 82 FL (ref 82–98)
MONOCYTES # BLD AUTO: 0.6 K/UL (ref 0.3–1)
MONOCYTES NFR BLD: 7.2 % (ref 4–15)
NEUTROPHILS # BLD AUTO: 6.1 K/UL (ref 1.8–7.7)
NEUTROPHILS NFR BLD: 68 % (ref 38–73)
NRBC BLD-RTO: 0 /100 WBC
PLATELET # BLD AUTO: 174 K/UL (ref 150–350)
PMV BLD AUTO: 10.8 FL (ref 9.2–12.9)
POTASSIUM SERPL-SCNC: 4.7 MMOL/L (ref 3.5–5.1)
PROT SERPL-MCNC: 7.9 G/DL (ref 6–8.4)
RBC # BLD AUTO: 4.98 M/UL (ref 4.6–6.2)
SODIUM SERPL-SCNC: 144 MMOL/L (ref 136–145)
WBC # BLD AUTO: 8.9 K/UL (ref 3.9–12.7)

## 2021-02-18 PROCEDURE — 82962 GLUCOSE BLOOD TEST: CPT | Mod: ,,, | Performed by: FAMILY MEDICINE

## 2021-02-18 PROCEDURE — 83036 POCT HEMOGLOBIN A1C: ICD-10-PCS | Mod: QW,,, | Performed by: FAMILY MEDICINE

## 2021-02-18 PROCEDURE — 99213 PR OFFICE/OUTPT VISIT, EST, LEVL III, 20-29 MIN: ICD-10-PCS | Mod: 25,S$GLB,, | Performed by: FAMILY MEDICINE

## 2021-02-18 PROCEDURE — 80053 COMPREHEN METABOLIC PANEL: CPT

## 2021-02-18 PROCEDURE — 85025 COMPLETE CBC W/AUTO DIFF WBC: CPT

## 2021-02-18 PROCEDURE — 82962 POCT GLUCOSE, HAND-HELD DEVICE: ICD-10-PCS | Mod: ,,, | Performed by: FAMILY MEDICINE

## 2021-02-18 PROCEDURE — 83036 HEMOGLOBIN GLYCOSYLATED A1C: CPT | Mod: QW,,, | Performed by: FAMILY MEDICINE

## 2021-02-18 PROCEDURE — 99213 OFFICE O/P EST LOW 20 MIN: CPT | Mod: 25,S$GLB,, | Performed by: FAMILY MEDICINE

## 2021-02-18 RX ORDER — ROSUVASTATIN CALCIUM 5 MG/1
5 TABLET, COATED ORAL DAILY
Qty: 90 TABLET | Refills: 3 | Status: SHIPPED | OUTPATIENT
Start: 2021-02-18 | End: 2021-11-22 | Stop reason: SDUPTHER

## 2021-02-18 RX ORDER — GABAPENTIN 300 MG/1
300 CAPSULE ORAL 3 TIMES DAILY
Qty: 270 CAPSULE | Refills: 3 | Status: SHIPPED | OUTPATIENT
Start: 2021-02-18 | End: 2021-03-18 | Stop reason: SDUPTHER

## 2021-02-18 RX ORDER — CELECOXIB 200 MG/1
200 CAPSULE ORAL DAILY
COMMUNITY
Start: 2021-02-08 | End: 2021-02-18 | Stop reason: SDUPTHER

## 2021-02-18 RX ORDER — METFORMIN HYDROCHLORIDE 500 MG/1
TABLET, EXTENDED RELEASE ORAL
Qty: 270 TABLET | Refills: 3 | Status: SHIPPED | OUTPATIENT
Start: 2021-02-18 | End: 2021-11-22 | Stop reason: SDUPTHER

## 2021-02-18 RX ORDER — CELECOXIB 200 MG/1
200 CAPSULE ORAL DAILY
Qty: 90 CAPSULE | Refills: 3 | Status: SHIPPED | OUTPATIENT
Start: 2021-02-18 | End: 2021-07-02 | Stop reason: SDUPTHER

## 2021-02-18 RX ORDER — DIAZEPAM 10 MG/1
10 TABLET ORAL DAILY
Qty: 30 TABLET | Refills: 0 | Status: SHIPPED | OUTPATIENT
Start: 2021-02-18 | End: 2021-03-18 | Stop reason: SDUPTHER

## 2021-02-25 DIAGNOSIS — Z12.11 COLON CANCER SCREENING: ICD-10-CM

## 2021-02-25 DIAGNOSIS — E11.9 TYPE 2 DIABETES MELLITUS WITHOUT COMPLICATION: ICD-10-CM

## 2021-03-05 ENCOUNTER — IMMUNIZATION (OUTPATIENT)
Dept: PRIMARY CARE CLINIC | Facility: CLINIC | Age: 66
End: 2021-03-05
Payer: MEDICARE

## 2021-03-05 DIAGNOSIS — Z23 NEED FOR VACCINATION: Primary | ICD-10-CM

## 2021-03-05 PROCEDURE — 0001A COVID-19, MRNA, LNP-S, PF, 30 MCG/0.3 ML DOSE VACCINE: ICD-10-PCS | Mod: CV19,S$GLB,, | Performed by: FAMILY MEDICINE

## 2021-03-05 PROCEDURE — 0001A COVID-19, MRNA, LNP-S, PF, 30 MCG/0.3 ML DOSE VACCINE: CPT | Mod: CV19,S$GLB,, | Performed by: FAMILY MEDICINE

## 2021-03-05 PROCEDURE — 91300 COVID-19, MRNA, LNP-S, PF, 30 MCG/0.3 ML DOSE VACCINE: ICD-10-PCS | Mod: S$GLB,,, | Performed by: FAMILY MEDICINE

## 2021-03-05 PROCEDURE — 91300 COVID-19, MRNA, LNP-S, PF, 30 MCG/0.3 ML DOSE VACCINE: CPT | Mod: S$GLB,,, | Performed by: FAMILY MEDICINE

## 2021-03-08 ENCOUNTER — TELEPHONE (OUTPATIENT)
Dept: FAMILY MEDICINE | Facility: CLINIC | Age: 66
End: 2021-03-08

## 2021-03-15 ENCOUNTER — PATIENT OUTREACH (OUTPATIENT)
Dept: ADMINISTRATIVE | Facility: HOSPITAL | Age: 66
End: 2021-03-15

## 2021-03-17 ENCOUNTER — TELEPHONE (OUTPATIENT)
Dept: FAMILY MEDICINE | Facility: CLINIC | Age: 66
End: 2021-03-17

## 2021-03-18 ENCOUNTER — OFFICE VISIT (OUTPATIENT)
Dept: FAMILY MEDICINE | Facility: CLINIC | Age: 66
End: 2021-03-18
Payer: MEDICARE

## 2021-03-18 VITALS
DIASTOLIC BLOOD PRESSURE: 72 MMHG | TEMPERATURE: 97 F | BODY MASS INDEX: 35.19 KG/M2 | OXYGEN SATURATION: 94 % | WEIGHT: 259.81 LBS | HEART RATE: 90 BPM | HEIGHT: 72 IN | SYSTOLIC BLOOD PRESSURE: 128 MMHG

## 2021-03-18 DIAGNOSIS — F41.9 ANXIETY: ICD-10-CM

## 2021-03-18 DIAGNOSIS — I25.10 CORONARY ARTERIOSCLEROSIS IN NATIVE ARTERY: ICD-10-CM

## 2021-03-18 DIAGNOSIS — M16.11 PRIMARY OSTEOARTHRITIS OF RIGHT HIP: ICD-10-CM

## 2021-03-18 DIAGNOSIS — G89.4 CHRONIC PAIN SYNDROME: ICD-10-CM

## 2021-03-18 DIAGNOSIS — R09.89 CAROTID BRUIT, UNSPECIFIED LATERALITY: ICD-10-CM

## 2021-03-18 DIAGNOSIS — E11.21 DIABETIC NEPHROPATHY ASSOCIATED WITH TYPE 2 DIABETES MELLITUS: Primary | ICD-10-CM

## 2021-03-18 LAB — GLUCOSE SERPL-MCNC: 124 MG/DL (ref 70–110)

## 2021-03-18 PROCEDURE — 82962 GLUCOSE BLOOD TEST: CPT | Mod: ,,, | Performed by: FAMILY MEDICINE

## 2021-03-18 PROCEDURE — 99214 OFFICE O/P EST MOD 30 MIN: CPT | Mod: S$GLB,,, | Performed by: FAMILY MEDICINE

## 2021-03-18 PROCEDURE — 82962 POCT GLUCOSE, HAND-HELD DEVICE: ICD-10-PCS | Mod: ,,, | Performed by: FAMILY MEDICINE

## 2021-03-18 PROCEDURE — 99214 PR OFFICE/OUTPT VISIT, EST, LEVL IV, 30-39 MIN: ICD-10-PCS | Mod: S$GLB,,, | Performed by: FAMILY MEDICINE

## 2021-03-18 RX ORDER — OXYCODONE HYDROCHLORIDE 20 MG/1
1 TABLET ORAL 4 TIMES DAILY PRN
COMMUNITY
Start: 2021-03-16

## 2021-03-18 RX ORDER — MORPHINE SULFATE 30 MG/1
30 TABLET ORAL 2 TIMES DAILY PRN
COMMUNITY
Start: 2021-03-16

## 2021-03-18 RX ORDER — DIAZEPAM 10 MG/1
10 TABLET ORAL DAILY
Qty: 30 TABLET | Refills: 0 | Status: SHIPPED | OUTPATIENT
Start: 2021-03-18 | End: 2021-07-02

## 2021-03-18 RX ORDER — PREDNISOLONE ACETATE 10 MG/ML
SUSPENSION/ DROPS OPHTHALMIC
COMMUNITY
End: 2021-07-27

## 2021-03-18 RX ORDER — GABAPENTIN 300 MG/1
CAPSULE ORAL
Qty: 270 CAPSULE | Refills: 11
Start: 2021-03-18 | End: 2021-07-02

## 2021-03-18 RX ORDER — BACITRACIN ZINC AND POLYMYXIN B SULFATE 500; 10000 [USP'U]/G; [USP'U]/G
OINTMENT OPHTHALMIC
COMMUNITY
End: 2021-07-27

## 2021-03-18 RX ORDER — OFLOXACIN 3 MG/ML
SOLUTION/ DROPS OPHTHALMIC
COMMUNITY
End: 2021-07-27

## 2021-03-26 ENCOUNTER — IMMUNIZATION (OUTPATIENT)
Dept: PRIMARY CARE CLINIC | Facility: CLINIC | Age: 66
End: 2021-03-26
Payer: MEDICARE

## 2021-03-26 DIAGNOSIS — Z23 NEED FOR VACCINATION: Primary | ICD-10-CM

## 2021-03-26 PROCEDURE — 91300 COVID-19, MRNA, LNP-S, PF, 30 MCG/0.3 ML DOSE VACCINE: CPT | Mod: S$GLB,,, | Performed by: FAMILY MEDICINE

## 2021-03-26 PROCEDURE — 0002A COVID-19, MRNA, LNP-S, PF, 30 MCG/0.3 ML DOSE VACCINE: ICD-10-PCS | Mod: CV19,S$GLB,, | Performed by: FAMILY MEDICINE

## 2021-03-26 PROCEDURE — 0002A COVID-19, MRNA, LNP-S, PF, 30 MCG/0.3 ML DOSE VACCINE: CPT | Mod: CV19,S$GLB,, | Performed by: FAMILY MEDICINE

## 2021-03-26 PROCEDURE — 91300 COVID-19, MRNA, LNP-S, PF, 30 MCG/0.3 ML DOSE VACCINE: ICD-10-PCS | Mod: S$GLB,,, | Performed by: FAMILY MEDICINE

## 2021-03-30 ENCOUNTER — PATIENT OUTREACH (OUTPATIENT)
Dept: ADMINISTRATIVE | Facility: HOSPITAL | Age: 66
End: 2021-03-30

## 2021-04-20 ENCOUNTER — PATIENT OUTREACH (OUTPATIENT)
Dept: ADMINISTRATIVE | Facility: HOSPITAL | Age: 66
End: 2021-04-20

## 2021-06-01 ENCOUNTER — TELEPHONE (OUTPATIENT)
Dept: FAMILY MEDICINE | Facility: CLINIC | Age: 66
End: 2021-06-01

## 2021-06-15 ENCOUNTER — LAB VISIT (OUTPATIENT)
Dept: LAB | Facility: CLINIC | Age: 66
End: 2021-06-15
Payer: MEDICARE

## 2021-06-15 DIAGNOSIS — E11.9 TYPE 2 DIABETES MELLITUS WITHOUT COMPLICATION, WITHOUT LONG-TERM CURRENT USE OF INSULIN: ICD-10-CM

## 2021-06-15 DIAGNOSIS — E11.9 TYPE 2 DIABETES MELLITUS WITHOUT COMPLICATION, WITHOUT LONG-TERM CURRENT USE OF INSULIN: Primary | ICD-10-CM

## 2021-06-15 LAB
T4 FREE SERPL-MCNC: 0.8 NG/DL (ref 0.71–1.51)
TSH SERPL DL<=0.005 MIU/L-ACNC: 11.39 UIU/ML (ref 0.4–4)

## 2021-06-15 PROCEDURE — 84439 ASSAY OF FREE THYROXINE: CPT | Performed by: STUDENT IN AN ORGANIZED HEALTH CARE EDUCATION/TRAINING PROGRAM

## 2021-06-15 PROCEDURE — 84443 ASSAY THYROID STIM HORMONE: CPT | Performed by: STUDENT IN AN ORGANIZED HEALTH CARE EDUCATION/TRAINING PROGRAM

## 2021-06-15 PROCEDURE — 36415 PR COLLECTION VENOUS BLOOD,VENIPUNCTURE: ICD-10-PCS | Mod: ,,, | Performed by: STUDENT IN AN ORGANIZED HEALTH CARE EDUCATION/TRAINING PROGRAM

## 2021-06-15 PROCEDURE — 36415 COLL VENOUS BLD VENIPUNCTURE: CPT | Mod: ,,, | Performed by: STUDENT IN AN ORGANIZED HEALTH CARE EDUCATION/TRAINING PROGRAM

## 2021-06-15 PROCEDURE — 83036 HEMOGLOBIN GLYCOSYLATED A1C: CPT | Performed by: STUDENT IN AN ORGANIZED HEALTH CARE EDUCATION/TRAINING PROGRAM

## 2021-06-16 LAB
ESTIMATED AVG GLUCOSE: 128 MG/DL (ref 68–131)
HBA1C MFR BLD: 6.1 % (ref 4–5.6)

## 2021-06-18 ENCOUNTER — TELEPHONE (OUTPATIENT)
Dept: FAMILY MEDICINE | Facility: CLINIC | Age: 66
End: 2021-06-18

## 2021-07-02 ENCOUNTER — PATIENT MESSAGE (OUTPATIENT)
Dept: ADMINISTRATIVE | Facility: HOSPITAL | Age: 66
End: 2021-07-02

## 2021-07-02 ENCOUNTER — TELEPHONE (OUTPATIENT)
Dept: FAMILY MEDICINE | Facility: CLINIC | Age: 66
End: 2021-07-02

## 2021-07-02 ENCOUNTER — OFFICE VISIT (OUTPATIENT)
Dept: FAMILY MEDICINE | Facility: CLINIC | Age: 66
End: 2021-07-02
Payer: MEDICARE

## 2021-07-02 VITALS
HEIGHT: 72 IN | WEIGHT: 251.19 LBS | HEART RATE: 82 BPM | TEMPERATURE: 99 F | OXYGEN SATURATION: 97 % | BODY MASS INDEX: 34.02 KG/M2 | SYSTOLIC BLOOD PRESSURE: 130 MMHG | DIASTOLIC BLOOD PRESSURE: 82 MMHG

## 2021-07-02 DIAGNOSIS — E11.22 TYPE 2 DIABETES MELLITUS WITH STAGE 3A CHRONIC KIDNEY DISEASE, WITHOUT LONG-TERM CURRENT USE OF INSULIN: Primary | ICD-10-CM

## 2021-07-02 DIAGNOSIS — M16.11 PRIMARY OSTEOARTHRITIS OF RIGHT HIP: ICD-10-CM

## 2021-07-02 DIAGNOSIS — I25.10 CORONARY ARTERIOSCLEROSIS IN NATIVE ARTERY: ICD-10-CM

## 2021-07-02 DIAGNOSIS — E03.8 SUBCLINICAL HYPOTHYROIDISM: ICD-10-CM

## 2021-07-02 DIAGNOSIS — B35.6 JOCK ITCH: ICD-10-CM

## 2021-07-02 DIAGNOSIS — R79.89 ELEVATED TSH: ICD-10-CM

## 2021-07-02 DIAGNOSIS — Z96.651 STATUS POST TOTAL RIGHT KNEE REPLACEMENT: ICD-10-CM

## 2021-07-02 DIAGNOSIS — Z79.891 LONG TERM (CURRENT) USE OF OPIATE ANALGESIC: ICD-10-CM

## 2021-07-02 DIAGNOSIS — I73.9 PVD (PERIPHERAL VASCULAR DISEASE) WITH CLAUDICATION: ICD-10-CM

## 2021-07-02 DIAGNOSIS — N18.31 TYPE 2 DIABETES MELLITUS WITH STAGE 3A CHRONIC KIDNEY DISEASE, WITHOUT LONG-TERM CURRENT USE OF INSULIN: Primary | ICD-10-CM

## 2021-07-02 DIAGNOSIS — I10 ESSENTIAL HYPERTENSION: ICD-10-CM

## 2021-07-02 DIAGNOSIS — E11.21 DIABETIC NEPHROPATHY ASSOCIATED WITH TYPE 2 DIABETES MELLITUS: ICD-10-CM

## 2021-07-02 DIAGNOSIS — G89.4 CHRONIC PAIN SYNDROME: ICD-10-CM

## 2021-07-02 DIAGNOSIS — E78.2 MIXED HYPERLIPIDEMIA: ICD-10-CM

## 2021-07-02 PROCEDURE — 99214 OFFICE O/P EST MOD 30 MIN: CPT | Mod: S$GLB,,, | Performed by: STUDENT IN AN ORGANIZED HEALTH CARE EDUCATION/TRAINING PROGRAM

## 2021-07-02 PROCEDURE — 99214 PR OFFICE/OUTPT VISIT, EST, LEVL IV, 30-39 MIN: ICD-10-PCS | Mod: S$GLB,,, | Performed by: STUDENT IN AN ORGANIZED HEALTH CARE EDUCATION/TRAINING PROGRAM

## 2021-07-02 RX ORDER — GABAPENTIN 300 MG/1
300 CAPSULE ORAL 2 TIMES DAILY
Qty: 90 CAPSULE | Refills: 3
Start: 2021-07-02 | End: 2021-11-22 | Stop reason: SDUPTHER

## 2021-07-02 RX ORDER — KETOCONAZOLE 20 MG/G
CREAM TOPICAL 2 TIMES DAILY
Qty: 60 G | Refills: 1 | Status: SHIPPED | OUTPATIENT
Start: 2021-07-02 | End: 2021-07-27

## 2021-07-02 RX ORDER — CELECOXIB 200 MG/1
200 CAPSULE ORAL DAILY
Qty: 90 CAPSULE | Refills: 3 | Status: SHIPPED | OUTPATIENT
Start: 2021-07-02 | End: 2021-11-22 | Stop reason: SDUPTHER

## 2021-07-02 RX ORDER — KETOCONAZOLE 20 MG/G
CREAM TOPICAL 2 TIMES DAILY
Qty: 60 G | Refills: 1 | Status: SHIPPED | OUTPATIENT
Start: 2021-07-02 | End: 2021-07-02

## 2021-07-05 ENCOUNTER — HOSPITAL ENCOUNTER (EMERGENCY)
Facility: HOSPITAL | Age: 66
Discharge: HOME | End: 2021-07-06
Attending: EMERGENCY MEDICINE
Payer: MEDICARE

## 2021-07-05 DIAGNOSIS — N20.0 KIDNEY STONE: Primary | ICD-10-CM

## 2021-07-05 LAB
ALBUMIN SERPL BCP-MCNC: 4.2 G/DL (ref 3.5–5.2)
ALP SERPL-CCNC: 124 U/L (ref 55–135)
ALT SERPL W/O P-5'-P-CCNC: 27 U/L (ref 10–44)
ANION GAP SERPL CALC-SCNC: 10 MMOL/L (ref 8–16)
AST SERPL-CCNC: 26 U/L (ref 10–40)
BASOPHILS # BLD AUTO: 0.03 K/UL (ref 0–0.2)
BASOPHILS NFR BLD: 0.4 % (ref 0–1.9)
BILIRUB SERPL-MCNC: 0.9 MG/DL (ref 0.1–1)
BUN SERPL-MCNC: 23 MG/DL (ref 8–23)
CALCIUM SERPL-MCNC: 9 MG/DL (ref 8.7–10.5)
CHLORIDE SERPL-SCNC: 105 MMOL/L (ref 95–110)
CO2 SERPL-SCNC: 24 MMOL/L (ref 23–29)
CREAT SERPL-MCNC: 1.4 MG/DL (ref 0.5–1.4)
DIFFERENTIAL METHOD: ABNORMAL
EOSINOPHIL # BLD AUTO: 0 K/UL (ref 0–0.5)
EOSINOPHIL NFR BLD: 0.6 % (ref 0–8)
ERYTHROCYTE [DISTWIDTH] IN BLOOD BY AUTOMATED COUNT: 15.5 % (ref 11.5–14.5)
EST. GFR  (AFRICAN AMERICAN): >60 ML/MIN/1.73 M^2
EST. GFR  (NON AFRICAN AMERICAN): 52 ML/MIN/1.73 M^2
GLUCOSE SERPL-MCNC: 138 MG/DL (ref 70–110)
HCT VFR BLD AUTO: 36.6 % (ref 40–54)
HGB BLD-MCNC: 11 G/DL (ref 14–18)
IMM GRANULOCYTES # BLD AUTO: 0.01 K/UL (ref 0–0.04)
IMM GRANULOCYTES NFR BLD AUTO: 0.1 % (ref 0–0.5)
LYMPHOCYTES # BLD AUTO: 0.9 K/UL (ref 1–4.8)
LYMPHOCYTES NFR BLD: 13.5 % (ref 18–48)
MCH RBC QN AUTO: 23.2 PG (ref 27–31)
MCHC RBC AUTO-ENTMCNC: 30.1 G/DL (ref 32–36)
MCV RBC AUTO: 77 FL (ref 82–98)
MONOCYTES # BLD AUTO: 0.5 K/UL (ref 0.3–1)
MONOCYTES NFR BLD: 7.1 % (ref 4–15)
NEUTROPHILS # BLD AUTO: 5.3 K/UL (ref 1.8–7.7)
NEUTROPHILS NFR BLD: 78.3 % (ref 38–73)
NRBC BLD-RTO: 0 /100 WBC
PLATELET # BLD AUTO: 128 K/UL (ref 150–450)
PMV BLD AUTO: 10.4 FL (ref 9.2–12.9)
POTASSIUM SERPL-SCNC: 4.3 MMOL/L (ref 3.5–5.1)
PROT SERPL-MCNC: 7.6 G/DL (ref 6–8.4)
RBC # BLD AUTO: 4.75 M/UL (ref 4.6–6.2)
SODIUM SERPL-SCNC: 139 MMOL/L (ref 136–145)
WBC # BLD AUTO: 6.73 K/UL (ref 3.9–12.7)

## 2021-07-05 PROCEDURE — 85025 COMPLETE CBC W/AUTO DIFF WBC: CPT | Performed by: EMERGENCY MEDICINE

## 2021-07-05 PROCEDURE — 80053 COMPREHEN METABOLIC PANEL: CPT | Performed by: EMERGENCY MEDICINE

## 2021-07-05 PROCEDURE — 63600175 PHARM REV CODE 636 W HCPCS: Performed by: EMERGENCY MEDICINE

## 2021-07-05 PROCEDURE — 96374 THER/PROPH/DIAG INJ IV PUSH: CPT | Mod: 59

## 2021-07-05 PROCEDURE — 99284 EMERGENCY DEPT VISIT MOD MDM: CPT | Mod: 25

## 2021-07-05 RX ORDER — METFORMIN HYDROCHLORIDE 500 MG/1
1000 TABLET, EXTENDED RELEASE ORAL NIGHTLY
COMMUNITY
End: 2021-07-27

## 2021-07-05 RX ORDER — MORPHINE SULFATE 2 MG/ML
2 INJECTION, SOLUTION INTRAMUSCULAR; INTRAVENOUS
Status: COMPLETED | OUTPATIENT
Start: 2021-07-05 | End: 2021-07-05

## 2021-07-05 RX ADMIN — MORPHINE SULFATE 2 MG: 2 INJECTION, SOLUTION INTRAMUSCULAR; INTRAVENOUS at 10:07

## 2021-07-06 ENCOUNTER — TELEPHONE (OUTPATIENT)
Dept: PODIATRY | Facility: CLINIC | Age: 66
End: 2021-07-06

## 2021-07-06 VITALS
SYSTOLIC BLOOD PRESSURE: 165 MMHG | BODY MASS INDEX: 33.86 KG/M2 | WEIGHT: 250 LBS | OXYGEN SATURATION: 96 % | RESPIRATION RATE: 18 BRPM | TEMPERATURE: 98 F | DIASTOLIC BLOOD PRESSURE: 79 MMHG | HEART RATE: 66 BPM | HEIGHT: 72 IN

## 2021-07-06 LAB
BACTERIA #/AREA URNS HPF: NEGATIVE /HPF
BILIRUB UR QL STRIP: NEGATIVE
CLARITY UR: CLEAR
COLOR UR: YELLOW
GLUCOSE SERPL-MCNC: 143 MG/DL (ref 70–110)
GLUCOSE UR QL STRIP: NEGATIVE
HGB UR QL STRIP: ABNORMAL
HYALINE CASTS #/AREA URNS LPF: 5 /LPF
KETONES UR QL STRIP: NEGATIVE
LEUKOCYTE ESTERASE UR QL STRIP: NEGATIVE
MICROSCOPIC COMMENT: ABNORMAL
NITRITE UR QL STRIP: NEGATIVE
PH UR STRIP: 6 [PH] (ref 5–8)
PROT UR QL STRIP: NEGATIVE
RBC #/AREA URNS HPF: 23 /HPF (ref 0–4)
SP GR UR STRIP: >=1.03 (ref 1–1.03)
SQUAMOUS #/AREA URNS HPF: 3 /HPF
URN SPEC COLLECT METH UR: ABNORMAL
UROBILINOGEN UR STRIP-ACNC: 1 EU/DL
WBC #/AREA URNS HPF: 1 /HPF (ref 0–5)

## 2021-07-06 PROCEDURE — 81001 URINALYSIS AUTO W/SCOPE: CPT | Performed by: EMERGENCY MEDICINE

## 2021-07-06 PROCEDURE — 51701 INSERT BLADDER CATHETER: CPT

## 2021-07-06 PROCEDURE — 96375 TX/PRO/DX INJ NEW DRUG ADDON: CPT | Mod: 59

## 2021-07-06 PROCEDURE — 96361 HYDRATE IV INFUSION ADD-ON: CPT

## 2021-07-06 PROCEDURE — 82962 GLUCOSE BLOOD TEST: CPT

## 2021-07-06 PROCEDURE — 63600175 PHARM REV CODE 636 W HCPCS: Performed by: EMERGENCY MEDICINE

## 2021-07-06 PROCEDURE — 96376 TX/PRO/DX INJ SAME DRUG ADON: CPT

## 2021-07-06 PROCEDURE — 25000003 PHARM REV CODE 250: Performed by: EMERGENCY MEDICINE

## 2021-07-06 RX ORDER — OXYCODONE AND ACETAMINOPHEN 5; 325 MG/1; MG/1
1 TABLET ORAL EVERY 4 HOURS PRN
Qty: 8 TABLET | Refills: 0 | Status: SHIPPED | OUTPATIENT
Start: 2021-07-06 | End: 2021-07-27

## 2021-07-06 RX ORDER — MORPHINE SULFATE 2 MG/ML
2 INJECTION, SOLUTION INTRAMUSCULAR; INTRAVENOUS
Status: DISCONTINUED | OUTPATIENT
Start: 2021-07-06 | End: 2021-07-06 | Stop reason: HOSPADM

## 2021-07-06 RX ORDER — HYDROMORPHONE HYDROCHLORIDE 1 MG/ML
0.5 INJECTION, SOLUTION INTRAMUSCULAR; INTRAVENOUS; SUBCUTANEOUS
Status: COMPLETED | OUTPATIENT
Start: 2021-07-06 | End: 2021-07-06

## 2021-07-06 RX ADMIN — HYDROMORPHONE HYDROCHLORIDE 0.5 MG: 1 INJECTION, SOLUTION INTRAMUSCULAR; INTRAVENOUS; SUBCUTANEOUS at 12:07

## 2021-07-06 RX ADMIN — SODIUM CHLORIDE 500 ML: 0.9 INJECTION, SOLUTION INTRAVENOUS at 12:07

## 2021-07-06 RX ADMIN — HYDROMORPHONE HYDROCHLORIDE 0.5 MG: 1 INJECTION, SOLUTION INTRAMUSCULAR; INTRAVENOUS; SUBCUTANEOUS at 01:07

## 2021-07-07 ENCOUNTER — PATIENT MESSAGE (OUTPATIENT)
Dept: ADMINISTRATIVE | Facility: HOSPITAL | Age: 66
End: 2021-07-07

## 2021-07-12 ENCOUNTER — HOSPITAL ENCOUNTER (OUTPATIENT)
Dept: RADIOLOGY | Facility: HOSPITAL | Age: 66
Discharge: HOME | End: 2021-07-12
Attending: SPECIALIST
Payer: MEDICARE

## 2021-07-12 DIAGNOSIS — N20.1 CALCULUS OF URETER: ICD-10-CM

## 2021-07-12 DIAGNOSIS — N20.1 CALCULUS OF URETER: Primary | ICD-10-CM

## 2021-07-12 PROCEDURE — 74018 RADEX ABDOMEN 1 VIEW: CPT | Mod: TC,PO

## 2021-07-20 ENCOUNTER — LAB VISIT (OUTPATIENT)
Dept: LAB | Facility: CLINIC | Age: 66
End: 2021-07-20
Payer: MEDICARE

## 2021-07-20 DIAGNOSIS — Z12.5 SCREENING FOR PROSTATE CANCER: ICD-10-CM

## 2021-07-20 DIAGNOSIS — Z12.5 PROSTATE CANCER SCREENING: ICD-10-CM

## 2021-07-20 DIAGNOSIS — N20.1 CALCULUS OF URETER: ICD-10-CM

## 2021-07-20 DIAGNOSIS — Z12.5 SPECIAL SCREENING FOR MALIGNANT NEOPLASM OF PROSTATE: Primary | ICD-10-CM

## 2021-07-20 LAB
ANION GAP SERPL CALC-SCNC: 8 MMOL/L (ref 8–16)
BUN SERPL-MCNC: 17 MG/DL (ref 8–23)
CALCIUM SERPL-MCNC: 9.3 MG/DL (ref 8.7–10.5)
CHLORIDE SERPL-SCNC: 103 MMOL/L (ref 95–110)
CO2 SERPL-SCNC: 28 MMOL/L (ref 23–29)
COMPLEXED PSA SERPL-MCNC: 0.86 NG/ML (ref 0–4)
CREAT SERPL-MCNC: 1.3 MG/DL (ref 0.5–1.4)
EST. GFR  (AFRICAN AMERICAN): >60 ML/MIN/1.73 M^2
EST. GFR  (NON AFRICAN AMERICAN): 57 ML/MIN/1.73 M^2
GLUCOSE SERPL-MCNC: 103 MG/DL (ref 70–110)
POTASSIUM SERPL-SCNC: 4.5 MMOL/L (ref 3.5–5.1)
SODIUM SERPL-SCNC: 139 MMOL/L (ref 136–145)

## 2021-07-20 PROCEDURE — 84153 ASSAY OF PSA TOTAL: CPT | Performed by: SPECIALIST

## 2021-07-20 PROCEDURE — 80048 BASIC METABOLIC PNL TOTAL CA: CPT | Performed by: SPECIALIST

## 2021-07-27 ENCOUNTER — OFFICE VISIT (OUTPATIENT)
Dept: CARDIOLOGY | Facility: CLINIC | Age: 66
End: 2021-07-27
Payer: MEDICARE

## 2021-07-27 VITALS
HEIGHT: 72 IN | OXYGEN SATURATION: 98 % | DIASTOLIC BLOOD PRESSURE: 80 MMHG | SYSTOLIC BLOOD PRESSURE: 124 MMHG | WEIGHT: 245 LBS | HEART RATE: 74 BPM | RESPIRATION RATE: 16 BRPM | BODY MASS INDEX: 33.18 KG/M2

## 2021-07-27 DIAGNOSIS — I73.9 PVD (PERIPHERAL VASCULAR DISEASE) WITH CLAUDICATION: ICD-10-CM

## 2021-07-27 DIAGNOSIS — E66.9 OBESITY WITH BODY MASS INDEX 30 OR GREATER: ICD-10-CM

## 2021-07-27 DIAGNOSIS — E11.21 DIABETIC NEPHROPATHY ASSOCIATED WITH TYPE 2 DIABETES MELLITUS: ICD-10-CM

## 2021-07-27 DIAGNOSIS — I20.89 STABLE ANGINA PECTORIS: Primary | ICD-10-CM

## 2021-07-27 DIAGNOSIS — G89.29 OTHER CHRONIC PAIN: ICD-10-CM

## 2021-07-27 DIAGNOSIS — E78.2 MIXED HYPERLIPIDEMIA: ICD-10-CM

## 2021-07-27 DIAGNOSIS — I10 ESSENTIAL HYPERTENSION: ICD-10-CM

## 2021-07-27 DIAGNOSIS — R10.13 DYSPEPSIA: ICD-10-CM

## 2021-07-27 PROCEDURE — 99214 PR OFFICE/OUTPT VISIT, EST, LEVL IV, 30-39 MIN: ICD-10-PCS | Mod: S$GLB,,, | Performed by: INTERNAL MEDICINE

## 2021-07-27 PROCEDURE — 93010 ELECTROCARDIOGRAM REPORT: CPT | Mod: S$GLB,,, | Performed by: INTERNAL MEDICINE

## 2021-07-27 PROCEDURE — 99214 OFFICE O/P EST MOD 30 MIN: CPT | Mod: S$GLB,,, | Performed by: INTERNAL MEDICINE

## 2021-07-27 PROCEDURE — 93005 ELECTROCARDIOGRAM TRACING: CPT | Mod: S$GLB,,, | Performed by: INTERNAL MEDICINE

## 2021-07-27 PROCEDURE — 93010 EKG 12-LEAD: ICD-10-PCS | Mod: S$GLB,,, | Performed by: INTERNAL MEDICINE

## 2021-07-27 PROCEDURE — 93005 EKG 12-LEAD: ICD-10-PCS | Mod: S$GLB,,, | Performed by: INTERNAL MEDICINE

## 2021-10-07 ENCOUNTER — PATIENT MESSAGE (OUTPATIENT)
Dept: ADMINISTRATIVE | Facility: HOSPITAL | Age: 66
End: 2021-10-07

## 2021-11-15 DIAGNOSIS — M43.26 FUSION OF SPINE OF LUMBAR REGION: Primary | ICD-10-CM

## 2021-11-15 DIAGNOSIS — Z79.891 LONG TERM CURRENT USE OF OPIATE ANALGESIC: ICD-10-CM

## 2021-11-15 DIAGNOSIS — G89.4 CHRONIC PAIN SYNDROME: ICD-10-CM

## 2021-11-15 DIAGNOSIS — M46.1 SACROILIITIS, NOT ELSEWHERE CLASSIFIED: ICD-10-CM

## 2021-11-15 DIAGNOSIS — M25.561 RIGHT KNEE PAIN: ICD-10-CM

## 2021-11-15 DIAGNOSIS — M25.551 RIGHT HIP PAIN: ICD-10-CM

## 2021-11-22 ENCOUNTER — LAB VISIT (OUTPATIENT)
Dept: LAB | Facility: CLINIC | Age: 66
End: 2021-11-22
Payer: MEDICARE

## 2021-11-22 ENCOUNTER — OFFICE VISIT (OUTPATIENT)
Dept: FAMILY MEDICINE | Facility: CLINIC | Age: 66
End: 2021-11-22
Payer: MEDICARE

## 2021-11-22 VITALS
DIASTOLIC BLOOD PRESSURE: 78 MMHG | SYSTOLIC BLOOD PRESSURE: 126 MMHG | BODY MASS INDEX: 31.56 KG/M2 | OXYGEN SATURATION: 96 % | WEIGHT: 233 LBS | TEMPERATURE: 98 F | HEART RATE: 80 BPM | RESPIRATION RATE: 18 BRPM | HEIGHT: 72 IN

## 2021-11-22 DIAGNOSIS — E11.22 TYPE 2 DIABETES MELLITUS WITH STAGE 3A CHRONIC KIDNEY DISEASE, WITHOUT LONG-TERM CURRENT USE OF INSULIN: ICD-10-CM

## 2021-11-22 DIAGNOSIS — M16.11 PRIMARY OSTEOARTHRITIS OF RIGHT HIP: ICD-10-CM

## 2021-11-22 DIAGNOSIS — G89.4 CHRONIC PAIN SYNDROME: ICD-10-CM

## 2021-11-22 DIAGNOSIS — G89.29 CHRONIC RIGHT HIP PAIN: ICD-10-CM

## 2021-11-22 DIAGNOSIS — E78.2 MIXED HYPERLIPIDEMIA: ICD-10-CM

## 2021-11-22 DIAGNOSIS — N18.31 TYPE 2 DIABETES MELLITUS WITH STAGE 3A CHRONIC KIDNEY DISEASE, WITHOUT LONG-TERM CURRENT USE OF INSULIN: Primary | ICD-10-CM

## 2021-11-22 DIAGNOSIS — G89.29 OTHER CHRONIC PAIN: ICD-10-CM

## 2021-11-22 DIAGNOSIS — N18.31 TYPE 2 DIABETES MELLITUS WITH STAGE 3A CHRONIC KIDNEY DISEASE, WITHOUT LONG-TERM CURRENT USE OF INSULIN: ICD-10-CM

## 2021-11-22 DIAGNOSIS — E11.22 TYPE 2 DIABETES MELLITUS WITH STAGE 3A CHRONIC KIDNEY DISEASE, WITHOUT LONG-TERM CURRENT USE OF INSULIN: Primary | ICD-10-CM

## 2021-11-22 DIAGNOSIS — E11.21 DIABETIC NEPHROPATHY ASSOCIATED WITH TYPE 2 DIABETES MELLITUS: ICD-10-CM

## 2021-11-22 DIAGNOSIS — Z79.891 LONG TERM (CURRENT) USE OF OPIATE ANALGESIC: ICD-10-CM

## 2021-11-22 DIAGNOSIS — M25.551 CHRONIC RIGHT HIP PAIN: ICD-10-CM

## 2021-11-22 PROCEDURE — 36415 PR COLLECTION VENOUS BLOOD,VENIPUNCTURE: ICD-10-PCS | Mod: ,,, | Performed by: STUDENT IN AN ORGANIZED HEALTH CARE EDUCATION/TRAINING PROGRAM

## 2021-11-22 PROCEDURE — 99213 OFFICE O/P EST LOW 20 MIN: CPT | Mod: S$GLB,,, | Performed by: STUDENT IN AN ORGANIZED HEALTH CARE EDUCATION/TRAINING PROGRAM

## 2021-11-22 PROCEDURE — 36415 COLL VENOUS BLD VENIPUNCTURE: CPT | Mod: ,,, | Performed by: STUDENT IN AN ORGANIZED HEALTH CARE EDUCATION/TRAINING PROGRAM

## 2021-11-22 PROCEDURE — 83036 HEMOGLOBIN GLYCOSYLATED A1C: CPT | Performed by: STUDENT IN AN ORGANIZED HEALTH CARE EDUCATION/TRAINING PROGRAM

## 2021-11-22 PROCEDURE — 99213 PR OFFICE/OUTPT VISIT, EST, LEVL III, 20-29 MIN: ICD-10-PCS | Mod: S$GLB,,, | Performed by: STUDENT IN AN ORGANIZED HEALTH CARE EDUCATION/TRAINING PROGRAM

## 2021-11-22 RX ORDER — ROSUVASTATIN CALCIUM 5 MG/1
5 TABLET, COATED ORAL DAILY
Qty: 90 TABLET | Refills: 3 | Status: SHIPPED | OUTPATIENT
Start: 2021-11-22 | End: 2022-06-07 | Stop reason: SDUPTHER

## 2021-11-22 RX ORDER — METFORMIN HYDROCHLORIDE 500 MG/1
TABLET, EXTENDED RELEASE ORAL
Qty: 270 TABLET | Refills: 3 | Status: SHIPPED | OUTPATIENT
Start: 2021-11-22 | End: 2022-06-07 | Stop reason: SDUPTHER

## 2021-11-22 RX ORDER — GABAPENTIN 300 MG/1
300 CAPSULE ORAL 2 TIMES DAILY
Qty: 180 CAPSULE | Refills: 3
Start: 2021-11-22 | End: 2022-12-07

## 2021-11-22 RX ORDER — CELECOXIB 200 MG/1
200 CAPSULE ORAL DAILY
Qty: 90 CAPSULE | Refills: 3 | Status: SHIPPED | OUTPATIENT
Start: 2021-11-22 | End: 2022-06-07 | Stop reason: SDUPTHER

## 2021-11-23 LAB
ESTIMATED AVG GLUCOSE: 120 MG/DL (ref 68–131)
HBA1C MFR BLD: 5.8 % (ref 4–5.6)

## 2021-11-29 ENCOUNTER — HOSPITAL ENCOUNTER (OUTPATIENT)
Dept: RADIOLOGY | Facility: HOSPITAL | Age: 66
Discharge: HOME | End: 2021-11-29
Attending: PAIN MEDICINE
Payer: MEDICARE

## 2021-11-29 DIAGNOSIS — M46.1 SACROILIITIS, NOT ELSEWHERE CLASSIFIED: ICD-10-CM

## 2021-11-29 DIAGNOSIS — G89.4 CHRONIC PAIN SYNDROME: ICD-10-CM

## 2021-11-29 DIAGNOSIS — M43.26 FUSION OF SPINE OF LUMBAR REGION: ICD-10-CM

## 2021-11-29 DIAGNOSIS — M25.551 RIGHT HIP PAIN: ICD-10-CM

## 2021-11-29 DIAGNOSIS — Z79.891 LONG TERM CURRENT USE OF OPIATE ANALGESIC: ICD-10-CM

## 2021-11-29 DIAGNOSIS — M25.561 RIGHT KNEE PAIN: ICD-10-CM

## 2021-11-29 PROCEDURE — 72148 MRI LUMBAR SPINE W/O DYE: CPT | Mod: TC,PO

## 2021-11-29 PROCEDURE — 73502 X-RAY EXAM HIP UNI 2-3 VIEWS: CPT | Mod: TC,PO,RT

## 2021-11-29 PROCEDURE — 72110 X-RAY EXAM L-2 SPINE 4/>VWS: CPT | Mod: TC,PO

## 2021-12-01 ENCOUNTER — HOSPITAL ENCOUNTER (OUTPATIENT)
Dept: RADIOLOGY | Facility: CLINIC | Age: 66
Discharge: HOME | End: 2021-12-01
Attending: INTERNAL MEDICINE
Payer: MEDICARE

## 2021-12-01 ENCOUNTER — HOSPITAL ENCOUNTER (OUTPATIENT)
Dept: CARDIOLOGY | Facility: CLINIC | Age: 66
Discharge: HOME | End: 2021-12-01
Attending: INTERNAL MEDICINE
Payer: MEDICARE

## 2021-12-01 VITALS — HEIGHT: 72 IN | BODY MASS INDEX: 33.18 KG/M2 | WEIGHT: 245 LBS

## 2021-12-01 DIAGNOSIS — E78.2 MIXED HYPERLIPIDEMIA: ICD-10-CM

## 2021-12-01 DIAGNOSIS — I20.89 STABLE ANGINA PECTORIS: ICD-10-CM

## 2021-12-01 DIAGNOSIS — I73.9 PVD (PERIPHERAL VASCULAR DISEASE) WITH CLAUDICATION: ICD-10-CM

## 2021-12-01 DIAGNOSIS — I10 ESSENTIAL HYPERTENSION: ICD-10-CM

## 2021-12-01 PROCEDURE — 78452 HT MUSCLE IMAGE SPECT MULT: CPT | Mod: S$GLB,,, | Performed by: INTERNAL MEDICINE

## 2021-12-01 PROCEDURE — 78452 STRESS TEST WITH MYOCARDIAL PERFUSION (CUPID ONLY): ICD-10-PCS | Mod: S$GLB,,, | Performed by: INTERNAL MEDICINE

## 2021-12-01 PROCEDURE — A9502 TC99M TETROFOSMIN: HCPCS | Mod: S$GLB,,, | Performed by: INTERNAL MEDICINE

## 2021-12-01 PROCEDURE — 93306 TTE W/DOPPLER COMPLETE: CPT | Mod: S$GLB,,, | Performed by: INTERNAL MEDICINE

## 2021-12-01 PROCEDURE — 93306 ECHO (CUPID ONLY): ICD-10-PCS | Mod: S$GLB,,, | Performed by: INTERNAL MEDICINE

## 2021-12-01 PROCEDURE — 93015 CV STRESS TEST SUPVJ I&R: CPT | Mod: S$GLB,,, | Performed by: INTERNAL MEDICINE

## 2021-12-01 PROCEDURE — A9502 STRESS TEST WITH MYOCARDIAL PERFUSION (CUPID ONLY): ICD-10-PCS | Mod: S$GLB,,, | Performed by: INTERNAL MEDICINE

## 2021-12-01 PROCEDURE — 93015 STRESS TEST WITH MYOCARDIAL PERFUSION (CUPID ONLY): ICD-10-PCS | Mod: S$GLB,,, | Performed by: INTERNAL MEDICINE

## 2021-12-01 RX ORDER — REGADENOSON 0.08 MG/ML
0.4 INJECTION, SOLUTION INTRAVENOUS ONCE
Status: COMPLETED | OUTPATIENT
Start: 2021-12-01 | End: 2021-12-01

## 2021-12-01 RX ADMIN — REGADENOSON 0.4 MG: 0.08 INJECTION, SOLUTION INTRAVENOUS at 09:12

## 2021-12-03 ENCOUNTER — TELEPHONE (OUTPATIENT)
Dept: CARDIOLOGY | Facility: CLINIC | Age: 66
End: 2021-12-03
Payer: MEDICARE

## 2021-12-07 LAB
CV PHARM DOSE: 0.4 MG
CV STRESS BASE HR: 65 BPM
DIASTOLIC BLOOD PRESSURE: 82 MMHG
EJECTION FRACTION- HIGH: 65 %
END DIASTOLIC INDEX-HIGH: 158 ML/M2
END DIASTOLIC INDEX-LOW: 94 ML/M2
END SYSTOLIC INDEX-HIGH: 71 ML/M2
END SYSTOLIC INDEX-LOW: 33 ML/M2
NUC STRESS DIASTOLIC VOLUME INDEX: 77
NUC STRESS EJECTION FRACTION: 57 %
NUC STRESS SYSTOLIC VOLUME INDEX: 33
OHS CV CPX 1 MINUTE RECOVERY HEART RATE: 90 BPM
OHS CV CPX 85 PERCENT MAX PREDICTED HEART RATE MALE: 131
OHS CV CPX MAX PREDICTED HEART RATE: 154
OHS CV CPX PATIENT IS FEMALE: 0
OHS CV CPX PATIENT IS MALE: 1
OHS CV CPX PEAK DIASTOLIC BLOOD PRESSURE: 64 MMHG
OHS CV CPX PEAK HEAR RATE: 90 BPM
OHS CV CPX PEAK RATE PRESSURE PRODUCT: NORMAL
OHS CV CPX PEAK SYSTOLIC BLOOD PRESSURE: 120 MMHG
OHS CV CPX PERCENT MAX PREDICTED HEART RATE ACHIEVED: 58
OHS CV CPX RATE PRESSURE PRODUCT PRESENTING: 7280
RETIRED EF AND QEF - SEE NOTES: 53 %
SYSTOLIC BLOOD PRESSURE: 112 MMHG

## 2021-12-08 LAB
AORTIC ROOT ANNULUS: 3.7 CM
AORTIC VALVE CUSP SEPERATION: 2.3 CM
AV INDEX (PROSTH): 0.82
AV MEAN GRADIENT: 3 MMHG
AV PEAK GRADIENT: 5 MMHG
AV VALVE AREA: 4.35 CM2
AV VELOCITY RATIO: 0.98
BSA FOR ECHO PROCEDURE: 2.38 M2
CV ECHO LV RWT: 0.63 CM
DOP CALC AO PEAK VEL: 1.14 M/S
DOP CALC AO VTI: 26.1 CM
DOP CALC LVOT AREA: 5.3 CM2
DOP CALC LVOT DIAMETER: 2.6 CM
DOP CALC LVOT PEAK VEL: 1.12 M/S
DOP CALC LVOT STROKE VOLUME: 113.56 CM3
DOP CALCLVOT PEAK VEL VTI: 21.4 CM
E WAVE DECELERATION TIME: 211 MS
E/A RATIO: 1.1
E/E' RATIO: 8 M/S
ECHO LV POSTERIOR WALL: 1.38 CM (ref 0.6–1.1)
EJECTION FRACTION: 62 %
FRACTIONAL SHORTENING: 33 % (ref 28–44)
INTERVENTRICULAR SEPTUM: 1.28 CM (ref 0.6–1.1)
IVRT: 106 MS
LA MAJOR: 4.3 CM
LEFT ATRIUM SIZE: 4.2 CM
LEFT INTERNAL DIMENSION IN SYSTOLE: 2.92 CM (ref 2.1–4)
LEFT VENTRICLE MASS INDEX: 94 G/M2
LEFT VENTRICULAR INTERNAL DIMENSION IN DIASTOLE: 4.35 CM (ref 3.5–6)
LEFT VENTRICULAR MASS: 218.72 G
LV LATERAL E/E' RATIO: 7 M/S
LV SEPTAL E/E' RATIO: 9.33 M/S
MV PEAK A VEL: 0.51 M/S
MV PEAK E VEL: 0.56 M/S
PISA TR MAX VEL: 2.31 M/S
RA PRESSURE: 3 MMHG
RIGHT VENTRICULAR END-DIASTOLIC DIMENSION: 2.15 CM
TDI LATERAL: 0.08 M/S
TDI SEPTAL: 0.06 M/S
TDI: 0.07 M/S
TR MAX PG: 21 MMHG
TV REST PULMONARY ARTERY PRESSURE: 24 MMHG

## 2022-01-31 ENCOUNTER — OFFICE VISIT (OUTPATIENT)
Dept: CARDIOLOGY | Facility: CLINIC | Age: 67
End: 2022-01-31
Payer: MEDICARE

## 2022-01-31 VITALS
BODY MASS INDEX: 30.07 KG/M2 | HEIGHT: 72 IN | WEIGHT: 222 LBS | DIASTOLIC BLOOD PRESSURE: 78 MMHG | SYSTOLIC BLOOD PRESSURE: 126 MMHG | HEART RATE: 84 BPM

## 2022-01-31 DIAGNOSIS — R94.39 POSITIVE CARDIAC STRESS TEST: Primary | ICD-10-CM

## 2022-01-31 DIAGNOSIS — I10 ESSENTIAL HYPERTENSION: ICD-10-CM

## 2022-01-31 DIAGNOSIS — E78.2 MIXED HYPERLIPIDEMIA: ICD-10-CM

## 2022-01-31 DIAGNOSIS — E11.9 TYPE 2 DIABETES MELLITUS WITHOUT COMPLICATION, WITHOUT LONG-TERM CURRENT USE OF INSULIN: ICD-10-CM

## 2022-01-31 DIAGNOSIS — I20.89 STABLE ANGINA PECTORIS: ICD-10-CM

## 2022-01-31 DIAGNOSIS — E11.21 DIABETIC NEPHROPATHY ASSOCIATED WITH TYPE 2 DIABETES MELLITUS: ICD-10-CM

## 2022-01-31 PROCEDURE — 99214 OFFICE O/P EST MOD 30 MIN: CPT | Mod: S$GLB,,, | Performed by: NURSE PRACTITIONER

## 2022-01-31 PROCEDURE — 99214 PR OFFICE/OUTPT VISIT, EST, LEVL IV, 30-39 MIN: ICD-10-PCS | Mod: S$GLB,,, | Performed by: NURSE PRACTITIONER

## 2022-01-31 RX ORDER — SODIUM CHLORIDE 9 MG/ML
INJECTION, SOLUTION INTRAVENOUS ONCE
Status: CANCELLED | OUTPATIENT
Start: 2022-01-31 | End: 2022-01-31

## 2022-01-31 RX ORDER — ISOSORBIDE MONONITRATE 30 MG/1
30 TABLET, EXTENDED RELEASE ORAL DAILY
Qty: 30 TABLET | Refills: 11 | Status: SHIPPED | OUTPATIENT
Start: 2022-01-31 | End: 2022-12-27

## 2022-01-31 RX ORDER — DIPHENHYDRAMINE HCL 25 MG
50 CAPSULE ORAL
Status: CANCELLED | OUTPATIENT
Start: 2022-01-31

## 2022-01-31 NOTE — ASSESSMENT & PLAN NOTE
Will plan for OP Cath  Continue ASA   Continue Statin  Add Imdur 30 mg daily  Discussed with patient the risks benefits and options and risks include but not limited to bleeding, vascular damage, renal failure, stroke, myocardial infarction, emergency bypass surgery and death.  All questions have been answered to patient's satisfaction.  Patient has voiced understanding of the procedure and agrees to move forward.  Informed Consent Obtained  See orders for further details.

## 2022-01-31 NOTE — H&P (VIEW-ONLY)
Subjective:    Patient ID:  Lei Hsu is a 66 y.o. male patient here for evaluation Hyperlipidemia, Hypertension, and Results (STRESS // ECHO)      History of Present Illness:       Mr. Hsu is here today for his follow up visit. He is here to obtain the results of his recent stress and ECHO. Stress is positive for ischemia. ECHO Shows normal LVEF. He has been having some chest pains he tells me now and than.       Review of patient's allergies indicates:  No Known Allergies    Past Medical History:   Diagnosis Date    Atherosclerosis     CAD (coronary artery disease) 1/26/2021    Cardiac murmur     DM (diabetes mellitus) 1/26/2021    Hyperlipidemia     Hypertension     Tobacco abuse      Past Surgical History:   Procedure Laterality Date    BACK SURGERY      L3-L4    KNEE ARTHROSCOPY      KNEE SURGERY       Social History     Tobacco Use    Smoking status: Current Every Day Smoker     Packs/day: 1.00     Years: 40.00     Pack years: 40.00     Types: Vaping with nicotine    Smokeless tobacco: Never Used   Substance Use Topics    Alcohol use: No    Drug use: No        Review of Systems:    As noted in HPI in addition      REVIEW OF SYSTEMS  CARDIOVASCULAR: +cp  RESPIRATORY: No recent fever, cough chills, SOB or congestion  : No blood in the urine  GI: No Nausea, vomiting, constipation, diarrhea, blood, or reflux.  MUSCULOSKELETAL: No myalgias  NEURO: No lightheadedness or dizziness  EYES: No Double vision, blurry, vision or headache              Objective        Vitals:    01/31/22 1401   BP: 126/78   Pulse: 84       LIPIDS - LAST 2   Lab Results   Component Value Date    CHOL 122 02/18/2021    HDL 34 (L) 02/18/2021    LDLCALC 62.8 (L) 02/18/2021    TRIG 126 02/18/2021    CHOLHDL 27.9 02/18/2021       CBC - LAST 2  Lab Results   Component Value Date    WBC 6.73 07/05/2021    WBC 8.90 02/18/2021    RBC 4.75 07/05/2021    RBC 4.98 02/18/2021    HGB 11.0 (L) 07/05/2021    HGB 11.7 (L)  02/18/2021    HCT 36.6 (L) 07/05/2021    HCT 40.7 02/18/2021    MCV 77 (L) 07/05/2021    MCV 82 02/18/2021    MCH 23.2 (L) 07/05/2021    MCH 23.5 (L) 02/18/2021    MCHC 30.1 (L) 07/05/2021    MCHC 28.7 (L) 02/18/2021    RDW 15.5 (H) 07/05/2021    RDW 14.6 (H) 02/18/2021     (L) 07/05/2021     02/18/2021    MPV 10.4 07/05/2021    MPV 10.8 02/18/2021    GRAN 5.3 07/05/2021    GRAN 78.3 (H) 07/05/2021    LYMPH 0.9 (L) 07/05/2021    LYMPH 13.5 (L) 07/05/2021    MONO 0.5 07/05/2021    MONO 7.1 07/05/2021    BASO 0.03 07/05/2021    BASO 0.08 02/18/2021    NRBC 0 07/05/2021    NRBC 0 02/18/2021       CHEMISTRY & LIVER FUNCTION - LAST 2  Lab Results   Component Value Date     07/20/2021     07/05/2021    K 4.5 07/20/2021    K 4.3 07/05/2021     07/20/2021     07/05/2021    CO2 28 07/20/2021    CO2 24 07/05/2021    ANIONGAP 8 07/20/2021    ANIONGAP 10 07/05/2021    BUN 17 07/20/2021    BUN 23 07/05/2021    CREATININE 1.3 07/20/2021    CREATININE 1.4 07/05/2021     07/20/2021     (H) 07/05/2021    CALCIUM 9.3 07/20/2021    CALCIUM 9.0 07/05/2021    ALBUMIN 4.2 07/05/2021    ALBUMIN 4.3 02/18/2021    PROT 7.6 07/05/2021    PROT 7.9 02/18/2021    ALKPHOS 124 07/05/2021    ALKPHOS 179 (H) 02/18/2021    ALT 27 07/05/2021    ALT 31 02/18/2021    AST 26 07/05/2021    AST 27 02/18/2021    BILITOT 0.9 07/05/2021    BILITOT 0.5 02/18/2021        CARDIAC PROFILE - LAST 2  No results found for: BNP, CPK, CPKMB, LDH, TROPONINI     COAGULATION - LAST 2  No results found for: LABPT, INR, APTT    ENDOCRINE & PSA - LAST 2  Lab Results   Component Value Date    HGBA1C 5.8 (H) 11/22/2021    HGBA1C 6.1 (H) 06/15/2021    TSH 11.386 (H) 06/15/2021    PSA 0.86 07/20/2021        ECHOCARDIOGRAM RESULTS  Results for orders placed during the hospital encounter of 12/01/21    Echo    Interpretation Summary  · The left ventricle is normal in size with mild concentric hypertrophy and normal systolic  function.  · The estimated ejection fraction is 62%.  · Normal left ventricular diastolic function.  · Normal right ventricular size with normal right ventricular systolic function.  · Mild left atrial enlargement.  · Normal central venous pressure (3 mmHg).  · The estimated PA systolic pressure is 24 mmHg.  · Mild tricuspid regurgitation.      CURRENT/PREVIOUS VISIT EKG  Results for orders placed or performed in visit on 07/27/21   IN OFFICE EKG 12-LEAD (to [place])    Collection Time: 07/27/21  2:27 PM    Narrative    Test Reason : I20.8,I73.9,I10,E78.2,    Vent. Rate : 075 BPM     Atrial Rate : 075 BPM     P-R Int : 140 ms          QRS Dur : 098 ms      QT Int : 354 ms       P-R-T Axes : 001 067 056 degrees     QTc Int : 395 ms    Normal sinus rhythm  Normal ECG  When compared with ECG of 21-JUN-2017 10:16,  No significant change was found  Confirmed by Ronny Torres MD [id number]) on 7/29/2021 9:34:28 AM    Referred By:  VB           Confirmed By:Ronny Torres MD     No valid procedures specified.   Results for orders placed during the hospital encounter of 12/01/21    Nuclear Stress - Cardiology Interpreted    Interpretation Summary    Abnormal myocardial perfusion scan.    There are two defects.    Defect #1 - There is a moderate to severe intensity, moderate to large sized, mostly reversible with some fixed areas defect in the mid to apical inferior and inferoseptal wall(s).    Defect #2 - There is a small to moderate sized, moderate intensity, mostly fixed with some reversibilty defect in the basal to apical anterior and anteroapical wall(s) in the typical distribution of the LAD territory.    The gated perfusion images showed an ejection fraction of 57% post stress. Normal ejection fraction is greater than 53%.    There is normal wall motion post stress.    LV cavity size is  and normal at stress.    The EKG portion of this study is negative for ischemia.    The patient reported no chest pain during  the stress test.    During stress, occasional PACs are noted.    Recommend further cardiac evaluation.    No valid procedures specified.    PHYSICAL EXAM  CONSTITUTIONAL: Well built, well nourished in no apparent distress  NECK: no carotid bruit, no JVD  LUNGS: CTA  CHEST WALL: no tenderness  HEART: regular rate and rhythm, S1, S2 normal, no murmur, click, rub or gallop   ABDOMEN: soft, non-tender; bowel sounds normal; no masses,  no organomegaly  EXTREMITIES: Extremities normal, no edema, no calf tenderness noted  NEURO: AAO X 3    I HAVE REVIEWED :    The vital signs, nurses notes, and all the pertinent radiology and labs.        Current Outpatient Medications   Medication Instructions    aspirin (ECOTRIN) 162 mg, Oral, Daily    celecoxib (CELEBREX) 200 mg, Oral, Daily    gabapentin (NEURONTIN) 300 mg, Oral, 2 times daily    isosorbide mononitrate (IMDUR) 30 mg, Oral, Daily    ketoconazole (NIZORAL) 2 % cream APPLY TOPICALLY TWICE DAILY FOR jock itch    metFORMIN (GLUCOPHAGE-XR) 500 MG ER 24hr tablet Take one by mouth in the morning and two by mouth in the evening.    MS CONTIN 30 mg, Oral, 2 times daily PRN    oxyCODONE (ROXICODONE) 20 mg Tab immediate release tablet 1 tablet, Oral, 4 times daily PRN    rosuvastatin (CRESTOR) 5 mg, Oral, Daily          Assessment & Plan     Essential hypertension  Controlled on current regimen  Add Imdur 30 mg for angina    Positive cardiac stress test  Will plan for OP Cath  Continue ASA   Continue Statin  Add Imdur 30 mg daily  Discussed with patient the risks benefits and options and risks include but not limited to bleeding, vascular damage, renal failure, stroke, myocardial infarction, emergency bypass surgery and death.  All questions have been answered to patient's satisfaction.  Patient has voiced understanding of the procedure and agrees to move forward.  Informed Consent Obtained  See orders for further details.    Stable angina pectoris  Add Imdur 30 mg  daily    Mixed hyperlipidemia  Continue Crestor daily          No follow-ups on file.

## 2022-01-31 NOTE — PROGRESS NOTES
Subjective:    Patient ID:  Lei Hsu is a 66 y.o. male patient here for evaluation Hyperlipidemia, Hypertension, and Results (STRESS // ECHO)      History of Present Illness:       Mr. Hsu is here today for his follow up visit. He is here to obtain the results of his recent stress and ECHO. Stress is positive for ischemia. ECHO Shows normal LVEF. He has been having some chest pains he tells me now and than.       Review of patient's allergies indicates:  No Known Allergies    Past Medical History:   Diagnosis Date    Atherosclerosis     CAD (coronary artery disease) 1/26/2021    Cardiac murmur     DM (diabetes mellitus) 1/26/2021    Hyperlipidemia     Hypertension     Tobacco abuse      Past Surgical History:   Procedure Laterality Date    BACK SURGERY      L3-L4    KNEE ARTHROSCOPY      KNEE SURGERY       Social History     Tobacco Use    Smoking status: Current Every Day Smoker     Packs/day: 1.00     Years: 40.00     Pack years: 40.00     Types: Vaping with nicotine    Smokeless tobacco: Never Used   Substance Use Topics    Alcohol use: No    Drug use: No        Review of Systems:    As noted in HPI in addition      REVIEW OF SYSTEMS  CARDIOVASCULAR: +cp  RESPIRATORY: No recent fever, cough chills, SOB or congestion  : No blood in the urine  GI: No Nausea, vomiting, constipation, diarrhea, blood, or reflux.  MUSCULOSKELETAL: No myalgias  NEURO: No lightheadedness or dizziness  EYES: No Double vision, blurry, vision or headache              Objective        Vitals:    01/31/22 1401   BP: 126/78   Pulse: 84       LIPIDS - LAST 2   Lab Results   Component Value Date    CHOL 122 02/18/2021    HDL 34 (L) 02/18/2021    LDLCALC 62.8 (L) 02/18/2021    TRIG 126 02/18/2021    CHOLHDL 27.9 02/18/2021       CBC - LAST 2  Lab Results   Component Value Date    WBC 6.73 07/05/2021    WBC 8.90 02/18/2021    RBC 4.75 07/05/2021    RBC 4.98 02/18/2021    HGB 11.0 (L) 07/05/2021    HGB 11.7 (L)  02/18/2021    HCT 36.6 (L) 07/05/2021    HCT 40.7 02/18/2021    MCV 77 (L) 07/05/2021    MCV 82 02/18/2021    MCH 23.2 (L) 07/05/2021    MCH 23.5 (L) 02/18/2021    MCHC 30.1 (L) 07/05/2021    MCHC 28.7 (L) 02/18/2021    RDW 15.5 (H) 07/05/2021    RDW 14.6 (H) 02/18/2021     (L) 07/05/2021     02/18/2021    MPV 10.4 07/05/2021    MPV 10.8 02/18/2021    GRAN 5.3 07/05/2021    GRAN 78.3 (H) 07/05/2021    LYMPH 0.9 (L) 07/05/2021    LYMPH 13.5 (L) 07/05/2021    MONO 0.5 07/05/2021    MONO 7.1 07/05/2021    BASO 0.03 07/05/2021    BASO 0.08 02/18/2021    NRBC 0 07/05/2021    NRBC 0 02/18/2021       CHEMISTRY & LIVER FUNCTION - LAST 2  Lab Results   Component Value Date     07/20/2021     07/05/2021    K 4.5 07/20/2021    K 4.3 07/05/2021     07/20/2021     07/05/2021    CO2 28 07/20/2021    CO2 24 07/05/2021    ANIONGAP 8 07/20/2021    ANIONGAP 10 07/05/2021    BUN 17 07/20/2021    BUN 23 07/05/2021    CREATININE 1.3 07/20/2021    CREATININE 1.4 07/05/2021     07/20/2021     (H) 07/05/2021    CALCIUM 9.3 07/20/2021    CALCIUM 9.0 07/05/2021    ALBUMIN 4.2 07/05/2021    ALBUMIN 4.3 02/18/2021    PROT 7.6 07/05/2021    PROT 7.9 02/18/2021    ALKPHOS 124 07/05/2021    ALKPHOS 179 (H) 02/18/2021    ALT 27 07/05/2021    ALT 31 02/18/2021    AST 26 07/05/2021    AST 27 02/18/2021    BILITOT 0.9 07/05/2021    BILITOT 0.5 02/18/2021        CARDIAC PROFILE - LAST 2  No results found for: BNP, CPK, CPKMB, LDH, TROPONINI     COAGULATION - LAST 2  No results found for: LABPT, INR, APTT    ENDOCRINE & PSA - LAST 2  Lab Results   Component Value Date    HGBA1C 5.8 (H) 11/22/2021    HGBA1C 6.1 (H) 06/15/2021    TSH 11.386 (H) 06/15/2021    PSA 0.86 07/20/2021        ECHOCARDIOGRAM RESULTS  Results for orders placed during the hospital encounter of 12/01/21    Echo    Interpretation Summary  · The left ventricle is normal in size with mild concentric hypertrophy and normal systolic  function.  · The estimated ejection fraction is 62%.  · Normal left ventricular diastolic function.  · Normal right ventricular size with normal right ventricular systolic function.  · Mild left atrial enlargement.  · Normal central venous pressure (3 mmHg).  · The estimated PA systolic pressure is 24 mmHg.  · Mild tricuspid regurgitation.      CURRENT/PREVIOUS VISIT EKG  Results for orders placed or performed in visit on 07/27/21   IN OFFICE EKG 12-LEAD (to [place])    Collection Time: 07/27/21  2:27 PM    Narrative    Test Reason : I20.8,I73.9,I10,E78.2,    Vent. Rate : 075 BPM     Atrial Rate : 075 BPM     P-R Int : 140 ms          QRS Dur : 098 ms      QT Int : 354 ms       P-R-T Axes : 001 067 056 degrees     QTc Int : 395 ms    Normal sinus rhythm  Normal ECG  When compared with ECG of 21-JUN-2017 10:16,  No significant change was found  Confirmed by Ronny Torres MD ([id number]) on 7/29/2021 9:34:28 AM    Referred By:  VB           Confirmed By:Ronny Torres MD     No valid procedures specified.   Results for orders placed during the hospital encounter of 12/01/21    Nuclear Stress - Cardiology Interpreted    Interpretation Summary    Abnormal myocardial perfusion scan.    There are two defects.    Defect #1 - There is a moderate to severe intensity, moderate to large sized, mostly reversible with some fixed areas defect in the mid to apical inferior and inferoseptal wall(s).    Defect #2 - There is a small to moderate sized, moderate intensity, mostly fixed with some reversibilty defect in the basal to apical anterior and anteroapical wall(s) in the typical distribution of the LAD territory.    The gated perfusion images showed an ejection fraction of 57% post stress. Normal ejection fraction is greater than 53%.    There is normal wall motion post stress.    LV cavity size is  and normal at stress.    The EKG portion of this study is negative for ischemia.    The patient reported no chest pain during  the stress test.    During stress, occasional PACs are noted.    Recommend further cardiac evaluation.    No valid procedures specified.    PHYSICAL EXAM  CONSTITUTIONAL: Well built, well nourished in no apparent distress  NECK: no carotid bruit, no JVD  LUNGS: CTA  CHEST WALL: no tenderness  HEART: regular rate and rhythm, S1, S2 normal, no murmur, click, rub or gallop   ABDOMEN: soft, non-tender; bowel sounds normal; no masses,  no organomegaly  EXTREMITIES: Extremities normal, no edema, no calf tenderness noted  NEURO: AAO X 3    I HAVE REVIEWED :    The vital signs, nurses notes, and all the pertinent radiology and labs.        Current Outpatient Medications   Medication Instructions    aspirin (ECOTRIN) 162 mg, Oral, Daily    celecoxib (CELEBREX) 200 mg, Oral, Daily    gabapentin (NEURONTIN) 300 mg, Oral, 2 times daily    isosorbide mononitrate (IMDUR) 30 mg, Oral, Daily    ketoconazole (NIZORAL) 2 % cream APPLY TOPICALLY TWICE DAILY FOR jock itch    metFORMIN (GLUCOPHAGE-XR) 500 MG ER 24hr tablet Take one by mouth in the morning and two by mouth in the evening.    MS CONTIN 30 mg, Oral, 2 times daily PRN    oxyCODONE (ROXICODONE) 20 mg Tab immediate release tablet 1 tablet, Oral, 4 times daily PRN    rosuvastatin (CRESTOR) 5 mg, Oral, Daily          Assessment & Plan     Essential hypertension  Controlled on current regimen  Add Imdur 30 mg for angina    Positive cardiac stress test  Will plan for OP Cath  Continue ASA   Continue Statin  Add Imdur 30 mg daily  Discussed with patient the risks benefits and options and risks include but not limited to bleeding, vascular damage, renal failure, stroke, myocardial infarction, emergency bypass surgery and death.  All questions have been answered to patient's satisfaction.  Patient has voiced understanding of the procedure and agrees to move forward.  Informed Consent Obtained  See orders for further details.    Stable angina pectoris  Add Imdur 30 mg  daily    Mixed hyperlipidemia  Continue Crestor daily          No follow-ups on file.

## 2022-02-11 ENCOUNTER — HOSPITAL ENCOUNTER (OUTPATIENT)
Dept: PREADMISSION TESTING | Facility: HOSPITAL | Age: 67
Discharge: HOME | End: 2022-02-11
Attending: INTERNAL MEDICINE
Payer: MEDICARE

## 2022-02-11 VITALS — BODY MASS INDEX: 29.8 KG/M2 | WEIGHT: 220 LBS | HEIGHT: 72 IN

## 2022-02-11 DIAGNOSIS — R94.39 POSITIVE CARDIAC STRESS TEST: ICD-10-CM

## 2022-02-11 DIAGNOSIS — Z01.810 PREOP CARDIOVASCULAR EXAM: Primary | ICD-10-CM

## 2022-02-11 DIAGNOSIS — R07.89 OTHER CHEST PAIN: ICD-10-CM

## 2022-02-11 LAB
ANION GAP SERPL CALC-SCNC: 9 MMOL/L (ref 8–16)
BASOPHILS # BLD AUTO: 0.02 K/UL (ref 0–0.2)
BASOPHILS NFR BLD: 0.6 % (ref 0–1.9)
BUN SERPL-MCNC: 18 MG/DL (ref 8–23)
CALCIUM SERPL-MCNC: 8.5 MG/DL (ref 8.7–10.5)
CHLORIDE SERPL-SCNC: 107 MMOL/L (ref 95–110)
CO2 SERPL-SCNC: 26 MMOL/L (ref 23–29)
CREAT SERPL-MCNC: 1.1 MG/DL (ref 0.5–1.4)
DIFFERENTIAL METHOD: ABNORMAL
EOSINOPHIL # BLD AUTO: 0.1 K/UL (ref 0–0.5)
EOSINOPHIL NFR BLD: 1.9 % (ref 0–8)
ERYTHROCYTE [DISTWIDTH] IN BLOOD BY AUTOMATED COUNT: 14.4 % (ref 11.5–14.5)
EST. GFR  (AFRICAN AMERICAN): >60 ML/MIN/1.73 M^2
EST. GFR  (NON AFRICAN AMERICAN): >60 ML/MIN/1.73 M^2
GLUCOSE SERPL-MCNC: 113 MG/DL (ref 70–110)
HCT VFR BLD AUTO: 35 % (ref 40–54)
HGB BLD-MCNC: 10.8 G/DL (ref 14–18)
IMM GRANULOCYTES # BLD AUTO: 0.01 K/UL (ref 0–0.04)
IMM GRANULOCYTES NFR BLD AUTO: 0.3 % (ref 0–0.5)
LYMPHOCYTES # BLD AUTO: 1.1 K/UL (ref 1–4.8)
LYMPHOCYTES NFR BLD: 35 % (ref 18–48)
MCH RBC QN AUTO: 25.2 PG (ref 27–31)
MCHC RBC AUTO-ENTMCNC: 30.9 G/DL (ref 32–36)
MCV RBC AUTO: 82 FL (ref 82–98)
MONOCYTES # BLD AUTO: 0.4 K/UL (ref 0.3–1)
MONOCYTES NFR BLD: 12.1 % (ref 4–15)
NEUTROPHILS # BLD AUTO: 1.6 K/UL (ref 1.8–7.7)
NEUTROPHILS NFR BLD: 50.1 % (ref 38–73)
NRBC BLD-RTO: 0 /100 WBC
PLATELET # BLD AUTO: 104 K/UL (ref 150–450)
PMV BLD AUTO: 10.8 FL (ref 9.2–12.9)
POTASSIUM SERPL-SCNC: 4.3 MMOL/L (ref 3.5–5.1)
RBC # BLD AUTO: 4.28 M/UL (ref 4.6–6.2)
SARS-COV-2 RDRP RESP QL NAA+PROBE: NEGATIVE
SODIUM SERPL-SCNC: 142 MMOL/L (ref 136–145)
WBC # BLD AUTO: 3.23 K/UL (ref 3.9–12.7)

## 2022-02-11 PROCEDURE — U0002 COVID-19 LAB TEST NON-CDC: HCPCS | Performed by: INTERNAL MEDICINE

## 2022-02-11 PROCEDURE — 93010 EKG 12-LEAD: ICD-10-PCS | Mod: ,,, | Performed by: SPECIALIST

## 2022-02-11 PROCEDURE — 93005 ELECTROCARDIOGRAM TRACING: CPT | Performed by: SPECIALIST

## 2022-02-11 PROCEDURE — 93010 ELECTROCARDIOGRAM REPORT: CPT | Mod: ,,, | Performed by: SPECIALIST

## 2022-02-11 PROCEDURE — 36415 COLL VENOUS BLD VENIPUNCTURE: CPT | Performed by: NURSE PRACTITIONER

## 2022-02-11 PROCEDURE — 80048 BASIC METABOLIC PNL TOTAL CA: CPT | Performed by: NURSE PRACTITIONER

## 2022-02-11 PROCEDURE — 85025 COMPLETE CBC W/AUTO DIFF WBC: CPT | Performed by: NURSE PRACTITIONER

## 2022-02-11 RX ORDER — DIPHENHYDRAMINE HCL 25 MG
25 TABLET ORAL NIGHTLY PRN
COMMUNITY

## 2022-02-11 NOTE — DISCHARGE INSTRUCTIONS
 Nothing to eat or drink after midnight the night before your procedure.   Do not take any medications the morning of your procedure   Bring all your medications with you in the original pill bottles from pharmacy.   Do not take metformin 24 hours prior to your procedure.   Do your chlorhexidine wash the night before and morning of your procedure.   Make arrangements for someone you know to drive you home after your procedure. Taxi and Uber are not acceptable.

## 2022-02-15 ENCOUNTER — TELEPHONE (OUTPATIENT)
Dept: CARDIOLOGY | Facility: CLINIC | Age: 67
End: 2022-02-15
Payer: MEDICARE

## 2022-02-15 ENCOUNTER — HOSPITAL ENCOUNTER (OUTPATIENT)
Facility: HOSPITAL | Age: 67
Discharge: HOME | End: 2022-02-15
Attending: INTERNAL MEDICINE | Admitting: INTERNAL MEDICINE
Payer: MEDICARE

## 2022-02-15 VITALS
WEIGHT: 220 LBS | OXYGEN SATURATION: 98 % | SYSTOLIC BLOOD PRESSURE: 118 MMHG | TEMPERATURE: 98 F | RESPIRATION RATE: 16 BRPM | HEART RATE: 71 BPM | BODY MASS INDEX: 29.8 KG/M2 | DIASTOLIC BLOOD PRESSURE: 68 MMHG | HEIGHT: 72 IN

## 2022-02-15 DIAGNOSIS — I25.10 CAD (CORONARY ARTERY DISEASE): ICD-10-CM

## 2022-02-15 DIAGNOSIS — I10 ESSENTIAL HYPERTENSION: ICD-10-CM

## 2022-02-15 DIAGNOSIS — I73.9 PVD (PERIPHERAL VASCULAR DISEASE) WITH CLAUDICATION: Primary | ICD-10-CM

## 2022-02-15 DIAGNOSIS — R94.39 POSITIVE CARDIAC STRESS TEST: ICD-10-CM

## 2022-02-15 PROCEDURE — C1894 INTRO/SHEATH, NON-LASER: HCPCS | Performed by: INTERNAL MEDICINE

## 2022-02-15 PROCEDURE — 63600175 PHARM REV CODE 636 W HCPCS: Performed by: INTERNAL MEDICINE

## 2022-02-15 PROCEDURE — 99153 MOD SED SAME PHYS/QHP EA: CPT | Performed by: INTERNAL MEDICINE

## 2022-02-15 PROCEDURE — C1769 GUIDE WIRE: HCPCS | Performed by: INTERNAL MEDICINE

## 2022-02-15 PROCEDURE — 93458 PR CATH PLACE/CORON ANGIO, IMG SUPER/INTERP,W LEFT HEART VENTRICULOGRAPHY: ICD-10-PCS | Mod: 26,,, | Performed by: INTERNAL MEDICINE

## 2022-02-15 PROCEDURE — 99152 PR MOD CONSCIOUS SEDATION, SAME PHYS, 5+ YRS, FIRST 15 MIN: ICD-10-PCS | Mod: ,,, | Performed by: INTERNAL MEDICINE

## 2022-02-15 PROCEDURE — 93458 L HRT ARTERY/VENTRICLE ANGIO: CPT

## 2022-02-15 PROCEDURE — 99152 MOD SED SAME PHYS/QHP 5/>YRS: CPT | Performed by: INTERNAL MEDICINE

## 2022-02-15 PROCEDURE — 25000003 PHARM REV CODE 250: Performed by: NURSE PRACTITIONER

## 2022-02-15 PROCEDURE — 25000003 PHARM REV CODE 250: Performed by: INTERNAL MEDICINE

## 2022-02-15 PROCEDURE — 93010 EKG 12-LEAD: ICD-10-PCS | Mod: 59,,, | Performed by: SPECIALIST

## 2022-02-15 PROCEDURE — 93458 L HRT ARTERY/VENTRICLE ANGIO: CPT | Mod: 26,,, | Performed by: INTERNAL MEDICINE

## 2022-02-15 PROCEDURE — 93005 ELECTROCARDIOGRAM TRACING: CPT | Performed by: SPECIALIST

## 2022-02-15 PROCEDURE — C1887 CATHETER, GUIDING: HCPCS | Performed by: INTERNAL MEDICINE

## 2022-02-15 PROCEDURE — 99152 MOD SED SAME PHYS/QHP 5/>YRS: CPT | Mod: ,,, | Performed by: INTERNAL MEDICINE

## 2022-02-15 PROCEDURE — 93010 ELECTROCARDIOGRAM REPORT: CPT | Mod: 59,,, | Performed by: SPECIALIST

## 2022-02-15 PROCEDURE — 25500020 PHARM REV CODE 255: Performed by: INTERNAL MEDICINE

## 2022-02-15 RX ORDER — SODIUM CHLORIDE 450 MG/100ML
INJECTION, SOLUTION INTRAVENOUS CONTINUOUS
Status: DISCONTINUED | OUTPATIENT
Start: 2022-02-15 | End: 2022-02-15 | Stop reason: HOSPADM

## 2022-02-15 RX ORDER — LIDOCAINE HYDROCHLORIDE 10 MG/ML
INJECTION, SOLUTION EPIDURAL; INFILTRATION; INTRACAUDAL; PERINEURAL
Status: DISCONTINUED | OUTPATIENT
Start: 2022-02-15 | End: 2022-02-15 | Stop reason: HOSPADM

## 2022-02-15 RX ORDER — MIDAZOLAM HYDROCHLORIDE 1 MG/ML
INJECTION INTRAMUSCULAR; INTRAVENOUS
Status: DISCONTINUED | OUTPATIENT
Start: 2022-02-15 | End: 2022-02-15 | Stop reason: HOSPADM

## 2022-02-15 RX ORDER — ACETAMINOPHEN 325 MG/1
650 TABLET ORAL EVERY 4 HOURS PRN
Status: DISCONTINUED | OUTPATIENT
Start: 2022-02-15 | End: 2022-02-15 | Stop reason: HOSPADM

## 2022-02-15 RX ORDER — HEPARIN SODIUM 10000 [USP'U]/ML
INJECTION, SOLUTION INTRAVENOUS; SUBCUTANEOUS
Status: DISCONTINUED | OUTPATIENT
Start: 2022-02-15 | End: 2022-02-15 | Stop reason: HOSPADM

## 2022-02-15 RX ORDER — NITROGLYCERIN 0.4 MG/1
0.4 TABLET SUBLINGUAL EVERY 5 MIN PRN
Status: DISCONTINUED | OUTPATIENT
Start: 2022-02-15 | End: 2022-02-15 | Stop reason: HOSPADM

## 2022-02-15 RX ORDER — IODIXANOL 320 MG/ML
INJECTION, SOLUTION INTRAVASCULAR
Status: DISCONTINUED | OUTPATIENT
Start: 2022-02-15 | End: 2022-02-15 | Stop reason: HOSPADM

## 2022-02-15 RX ORDER — SODIUM CHLORIDE 9 MG/ML
INJECTION, SOLUTION INTRAVENOUS ONCE
Status: COMPLETED | OUTPATIENT
Start: 2022-02-15 | End: 2022-02-15

## 2022-02-15 RX ORDER — ONDANSETRON 4 MG/1
8 TABLET, ORALLY DISINTEGRATING ORAL EVERY 8 HOURS PRN
Status: DISCONTINUED | OUTPATIENT
Start: 2022-02-15 | End: 2022-02-15 | Stop reason: HOSPADM

## 2022-02-15 RX ORDER — DIPHENHYDRAMINE HCL 25 MG
50 CAPSULE ORAL
Status: DISCONTINUED | OUTPATIENT
Start: 2022-02-15 | End: 2022-02-15 | Stop reason: HOSPADM

## 2022-02-15 RX ORDER — NITROGLYCERIN 0.4 MG/1
0.4 TABLET SUBLINGUAL EVERY 5 MIN PRN
Status: DISCONTINUED | OUTPATIENT
Start: 2022-02-15 | End: 2022-02-15

## 2022-02-15 RX ORDER — FENTANYL CITRATE 50 UG/ML
INJECTION, SOLUTION INTRAMUSCULAR; INTRAVENOUS
Status: DISCONTINUED | OUTPATIENT
Start: 2022-02-15 | End: 2022-02-15 | Stop reason: HOSPADM

## 2022-02-15 RX ORDER — ONDANSETRON 4 MG/1
8 TABLET, ORALLY DISINTEGRATING ORAL EVERY 8 HOURS PRN
Status: DISCONTINUED | OUTPATIENT
Start: 2022-02-15 | End: 2022-02-15

## 2022-02-15 RX ORDER — SODIUM CHLORIDE 450 MG/100ML
INJECTION, SOLUTION INTRAVENOUS CONTINUOUS
Status: DISCONTINUED | OUTPATIENT
Start: 2022-02-15 | End: 2022-02-15

## 2022-02-15 RX ORDER — ACETAMINOPHEN 325 MG/1
650 TABLET ORAL EVERY 4 HOURS PRN
Status: DISCONTINUED | OUTPATIENT
Start: 2022-02-15 | End: 2022-02-15

## 2022-02-15 RX ADMIN — SODIUM CHLORIDE: 0.9 INJECTION, SOLUTION INTRAVENOUS at 07:02

## 2022-02-15 RX ADMIN — DIPHENHYDRAMINE HYDROCHLORIDE 50 MG: 25 CAPSULE ORAL at 07:02

## 2022-02-15 NOTE — Clinical Note
The catheter was repositioned into the ostium   right coronary artery. An angiography was performed of the right coronary arteries. Multiple views were taken. The angiography was performed via power injection.

## 2022-02-15 NOTE — Clinical Note
The right groin and right radial was prepped. The site was prepped with ChloraPrep. The site was clipped. The patient was draped. The patient was positioned supine. The patient was secured using safety straps and to an armboard.

## 2022-02-15 NOTE — Clinical Note
The catheter was inserted into the ostium   left main. An angiography was performed of the left coronary arteries. Multiple views were taken. The angiography was performed via power injection.

## 2022-02-15 NOTE — Clinical Note
Radial flush administered by Dr. Burton.   Radial Flush:1.25 mg of Verapamil, 1.25 mg of Nitroglycerin, & 750 units of Heparin mixed in 250 ml of NS

## 2022-02-15 NOTE — Clinical Note
The radial band was applied to the right radial artery. 10 cc's of air were inserted into the closure device.

## 2022-02-15 NOTE — TELEPHONE ENCOUNTER
----- Message from Satnam Redmond sent at 2/15/2022  2:10 PM CST -----  Contact: Daughter  Pt daughter is calling to set up a f/u appt from pt Angiogram done today. Please call pt back at [phone] to make an appt and update and advise please.

## 2022-02-27 LAB — CATH EF QUANTITATIVE: 65 %

## 2022-03-07 ENCOUNTER — OFFICE VISIT (OUTPATIENT)
Dept: CARDIOLOGY | Facility: CLINIC | Age: 67
End: 2022-03-07
Payer: MEDICARE

## 2022-03-07 VITALS
HEART RATE: 76 BPM | OXYGEN SATURATION: 96 % | HEIGHT: 72 IN | DIASTOLIC BLOOD PRESSURE: 88 MMHG | BODY MASS INDEX: 30.88 KG/M2 | SYSTOLIC BLOOD PRESSURE: 138 MMHG | WEIGHT: 228 LBS | RESPIRATION RATE: 16 BRPM

## 2022-03-07 DIAGNOSIS — K21.9 GASTROESOPHAGEAL REFLUX DISEASE, UNSPECIFIED WHETHER ESOPHAGITIS PRESENT: ICD-10-CM

## 2022-03-07 DIAGNOSIS — I10 ESSENTIAL HYPERTENSION: ICD-10-CM

## 2022-03-07 DIAGNOSIS — Z95.5 S/P CORONARY ARTERY STENT PLACEMENT: ICD-10-CM

## 2022-03-07 PROCEDURE — 99214 OFFICE O/P EST MOD 30 MIN: CPT | Mod: S$GLB,,,

## 2022-03-07 PROCEDURE — 99214 PR OFFICE/OUTPT VISIT, EST, LEVL IV, 30-39 MIN: ICD-10-PCS | Mod: S$GLB,,,

## 2022-03-07 RX ORDER — OMEPRAZOLE 40 MG/1
40 CAPSULE, DELAYED RELEASE ORAL DAILY
Qty: 30 CAPSULE | Refills: 11 | Status: SHIPPED | OUTPATIENT
Start: 2022-03-07 | End: 2023-02-28

## 2022-03-07 NOTE — ASSESSMENT & PLAN NOTE
Has a hx   With chest pains at night when lying down   Will restart him on Prilosec daily and see if this continues to help improve his pains along with the imdur

## 2022-03-07 NOTE — ASSESSMENT & PLAN NOTE
Non obstructive disease seen on recent [facility]   Chest pain has improved   Cont ASA and statin   Cont imdur

## 2022-03-07 NOTE — PROGRESS NOTES
Subjective:    Patient ID:  Lei Hsu is a 67 y.o. male patient here for evaluation Follow-up (angiogram)      History of Present Illness:   Patient is here today for an angiogram follow up. He is still having some mild chest pains mostly at night, but the Imdur has helped. He had non-obstructive disease on his [facility]. He used to have severe GERD and it has not been as bad since he quit smoking or drinking. He does get dizzy occasionally if he stands to quickly, but is ok if he takes it slower.  He has no SOB, nausea, vomiting, neck/arm/jaw pain, syncope.     Summary [facility] 2/27/2022        · The left ventricular systolic function was normal.  · The pre-procedure left ventricular end diastolic pressure was 3.  · The post-procedure left ventricular end diastolic pressure was 4.  · The ejection fraction was calculated to be 65%.  · There was trivial (1+) mitral regurgitation.  · The estimated blood loss was <50 mL.  · There was non-obstructive coronary artery disease..          Review of patient's allergies indicates:  No Known Allergies    Past Medical History:   Diagnosis Date    Atherosclerosis     CAD (coronary artery disease) 1/26/2021    Cardiac murmur     DM (diabetes mellitus) 1/26/2021    Hyperlipidemia     Hypertension     Tobacco abuse      Past Surgical History:   Procedure Laterality Date    BACK SURGERY      L3-L4    CORONARY ANGIOGRAPHY N/A 2/15/2022    Procedure: ANGIOGRAM, CORONARY ARTERY;  Surgeon: Howard Burton MD;  Location: [facility] CATH/EP LAB;  Service: Cardiology;  Laterality: N/A;    KNEE ARTHROSCOPY      KNEE SURGERY      LEFT HEART CATHETERIZATION Left 2/15/2022    Procedure: CATHETERIZATION, HEART, LEFT;  Surgeon: Howard Burton MD;  Location: [facility] CATH/EP LAB;  Service: Cardiology;  Laterality: Left;     Social History     Tobacco Use    Smoking status: Current Every Day Smoker     Packs/day: 1.00     Years: 40.00     Pack years: 40.00     Types: Vaping with nicotine     Smokeless tobacco: Never Used   Substance Use Topics    Alcohol use: No    Drug use: No        Review of Systems:    As noted in HPI in addition      REVIEW OF SYSTEMS  CARDIOVASCULAR: No recent chest pain, palpitations, arm, neck, or jaw pain  RESPIRATORY: No recent fever, cough chills, SOB or congestion  : No blood in the urine  GI: No Nausea, vomiting, constipation, diarrhea, blood, or reflux.  MUSCULOSKELETAL: No myalgias  NEURO: No lightheadedness or dizziness  EYES: No Double vision, blurry, vision or headache              Objective        Vitals:    03/07/22 1508   BP: 138/88   Pulse: 76   Resp: 16       LIPIDS - LAST 2   Lab Results   Component Value Date    CHOL 122 02/18/2021    HDL 34 (L) 02/18/2021    LDLCALC 62.8 (L) 02/18/2021    TRIG 126 02/18/2021    CHOLHDL 27.9 02/18/2021       CBC - LAST 2  Lab Results   Component Value Date    WBC 3.23 (L) 02/11/2022    WBC 6.73 07/05/2021    RBC 4.28 (L) 02/11/2022    RBC 4.75 07/05/2021    HGB 10.8 (L) 02/11/2022    HGB 11.0 (L) 07/05/2021    HCT 35.0 (L) 02/11/2022    HCT 36.6 (L) 07/05/2021    MCV 82 02/11/2022    MCV 77 (L) 07/05/2021    MCH 25.2 (L) 02/11/2022    MCH 23.2 (L) 07/05/2021    MCHC 30.9 (L) 02/11/2022    MCHC 30.1 (L) 07/05/2021    RDW 14.4 02/11/2022    RDW 15.5 (H) 07/05/2021     (L) 02/11/2022     (L) 07/05/2021    MPV 10.8 02/11/2022    MPV 10.4 07/05/2021    GRAN 1.6 (L) 02/11/2022    GRAN 50.1 02/11/2022    LYMPH 1.1 02/11/2022    LYMPH 35.0 02/11/2022    MONO 0.4 02/11/2022    MONO 12.1 02/11/2022    BASO 0.02 02/11/2022    BASO 0.03 07/05/2021    NRBC 0 02/11/2022    NRBC 0 07/05/2021       CHEMISTRY & LIVER FUNCTION - LAST 2  Lab Results   Component Value Date     02/11/2022     07/20/2021    K 4.3 02/11/2022    K 4.5 07/20/2021     02/11/2022     07/20/2021    CO2 26 02/11/2022    CO2 28 07/20/2021    ANIONGAP 9 02/11/2022    ANIONGAP 8 07/20/2021    BUN 18 02/11/2022    BUN 17 07/20/2021     CREATININE 1.1 02/11/2022    CREATININE 1.3 07/20/2021     (H) 02/11/2022     07/20/2021    CALCIUM 8.5 (L) 02/11/2022    CALCIUM 9.3 07/20/2021    ALBUMIN 4.2 07/05/2021    ALBUMIN 4.3 02/18/2021    PROT 7.6 07/05/2021    PROT 7.9 02/18/2021    ALKPHOS 124 07/05/2021    ALKPHOS 179 (H) 02/18/2021    ALT 27 07/05/2021    ALT 31 02/18/2021    AST 26 07/05/2021    AST 27 02/18/2021    BILITOT 0.9 07/05/2021    BILITOT 0.5 02/18/2021        CARDIAC PROFILE - LAST 2  No results found for: BNP, CPK, CPKMB, LDH, TROPONINI     COAGULATION - LAST 2  No results found for: LABPT, INR, APTT    ENDOCRINE & PSA - LAST 2  Lab Results   Component Value Date    HGBA1C 5.8 (H) 11/22/2021    HGBA1C 6.1 (H) 06/15/2021    TSH 11.386 (H) 06/15/2021    PSA 0.86 07/20/2021        ECHOCARDIOGRAM RESULTS  Results for orders placed during the hospital encounter of 12/01/21    Echo    Interpretation Summary  · The left ventricle is normal in size with mild concentric hypertrophy and normal systolic function.  · The estimated ejection fraction is 62%.  · Normal left ventricular diastolic function.  · Normal right ventricular size with normal right ventricular systolic function.  · Mild left atrial enlargement.  · Normal central venous pressure (3 mmHg).  · The estimated PA systolic pressure is 24 mmHg.  · Mild tricuspid regurgitation.      CURRENT/PREVIOUS VISIT EKG  Results for orders placed or performed during the hospital encounter of 02/15/22   EKG 12-LEAD on arrival to floor    Collection Time: 02/15/22  9:57 AM    Narrative    Test Reason : I25.10,    Vent. Rate : 061 BPM     Atrial Rate : 061 BPM     P-R Int : 118 ms          QRS Dur : 086 ms      QT Int : 426 ms       P-R-T Axes : 023 118 094 degrees     QTc Int : 428 ms    Sinus rhythm with Premature atrial complexes  Lateral infarct ,age undetermined  Abnormal ECG  When compared with ECG of 11-FEB-2022 13:12,  Premature atrial complexes are now Present  Lateral infarct  is now Present  Confirmed by Kg Boykin MD ([id number]) on 2/20/2022 5:54:22 PM    Referred By:             Confirmed By:Kg Boykin MD     No valid procedures specified.   Results for orders placed during the hospital encounter of 12/01/21    Nuclear Stress - Cardiology Interpreted    Interpretation Summary    Abnormal myocardial perfusion scan.    There are two defects.    Defect #1 - There is a moderate to severe intensity, moderate to large sized, mostly reversible with some fixed areas defect in the mid to apical inferior and inferoseptal wall(s).    Defect #2 - There is a small to moderate sized, moderate intensity, mostly fixed with some reversibilty defect in the basal to apical anterior and anteroapical wall(s) in the typical distribution of the LAD territory.    The gated perfusion images showed an ejection fraction of 57% post stress. Normal ejection fraction is greater than 53%.    There is normal wall motion post stress.    LV cavity size is  and normal at stress.    The EKG portion of this study is negative for ischemia.    The patient reported no chest pain during the stress test.    During stress, occasional PACs are noted.    Recommend further cardiac evaluation.    No valid procedures specified.    PHYSICAL EXAM  CONSTITUTIONAL: Well built, well nourished in no apparent distress  NECK: no carotid bruit, no JVD  LUNGS: CTA  CHEST WALL: no tenderness  HEART: regular rate and rhythm, S1, S2 normal, no murmur, click, rub or gallop   ABDOMEN: soft, non-tender; bowel sounds normal; no masses,  no organomegaly  EXTREMITIES: Extremities normal, no edema, no calf tenderness noted  NEURO: AAO X 3    I HAVE REVIEWED :    The vital signs, nurses notes, and all the pertinent radiology and labs.        Current Outpatient Medications   Medication Instructions    aspirin (ECOTRIN) 81 mg, Oral, 2 times daily    celecoxib (CELEBREX) 200 mg, Oral, Daily    diphenhydrAMINE (SOMINEX) 25 mg, Oral,  Nightly PRN, Pt takes 1 1/2 tabs for sleep at night    gabapentin (NEURONTIN) 300 mg, Oral, 2 times daily    isosorbide mononitrate (IMDUR) 30 mg, Oral, Daily    ketoconazole (NIZORAL) 2 % cream APPLY TOPICALLY TWICE DAILY FOR jock itch    metFORMIN (GLUCOPHAGE-XR) 500 MG ER 24hr tablet Take one by mouth in the morning and two by mouth in the evening.    MS CONTIN 30 mg, Oral, 2 times daily PRN    oxyCODONE (ROXICODONE) 20 mg Tab immediate release tablet 1 tablet, Oral, 4 times daily PRN    rosuvastatin (CRESTOR) 5 mg, Oral, Daily          Assessment & Plan     S/P coronary artery stent placement  Non obstructive disease seen on recent [facility]   Chest pain has improved   Cont ASA and statin   Cont imdur     Essential hypertension  /88   Continue imdur   Low Na diet     GERD (gastroesophageal reflux disease)  Has a hx   With chest pains at night when lying down   Will restart him on Prilosec daily and see if this continues to help improve his pains along with the imdur           Follow up in about 6 months (around 9/7/2022).

## 2022-03-09 DIAGNOSIS — Z12.11 COLON CANCER SCREENING: ICD-10-CM

## 2022-03-14 ENCOUNTER — PATIENT MESSAGE (OUTPATIENT)
Dept: ADMINISTRATIVE | Facility: HOSPITAL | Age: 67
End: 2022-03-14
Payer: MEDICARE

## 2022-03-28 ENCOUNTER — PATIENT MESSAGE (OUTPATIENT)
Dept: ADMINISTRATIVE | Facility: HOSPITAL | Age: 67
End: 2022-03-28
Payer: MEDICARE

## 2022-04-27 DIAGNOSIS — E11.9 TYPE 2 DIABETES MELLITUS WITHOUT COMPLICATION, UNSPECIFIED WHETHER LONG TERM INSULIN USE: ICD-10-CM

## 2022-05-02 ENCOUNTER — PATIENT MESSAGE (OUTPATIENT)
Dept: ADMINISTRATIVE | Facility: HOSPITAL | Age: 67
End: 2022-05-02
Payer: MEDICARE

## 2022-05-23 PROBLEM — D69.6 THROMBOCYTOPENIA, UNSPECIFIED: Status: ACTIVE | Noted: 2022-05-23

## 2022-05-23 PROBLEM — E66.01 MORBID (SEVERE) OBESITY DUE TO EXCESS CALORIES: Status: ACTIVE | Noted: 2022-05-23

## 2022-05-31 ENCOUNTER — PATIENT MESSAGE (OUTPATIENT)
Dept: ADMINISTRATIVE | Facility: HOSPITAL | Age: 67
End: 2022-05-31
Payer: MEDICARE

## 2022-06-07 ENCOUNTER — OFFICE VISIT (OUTPATIENT)
Dept: FAMILY MEDICINE | Facility: CLINIC | Age: 67
End: 2022-06-07
Payer: MEDICARE

## 2022-06-07 ENCOUNTER — LAB VISIT (OUTPATIENT)
Dept: LAB | Facility: CLINIC | Age: 67
End: 2022-06-07
Payer: MEDICARE

## 2022-06-07 VITALS
SYSTOLIC BLOOD PRESSURE: 128 MMHG | OXYGEN SATURATION: 96 % | HEART RATE: 86 BPM | WEIGHT: 215.38 LBS | HEIGHT: 72 IN | DIASTOLIC BLOOD PRESSURE: 76 MMHG | TEMPERATURE: 98 F | RESPIRATION RATE: 18 BRPM | BODY MASS INDEX: 29.17 KG/M2

## 2022-06-07 DIAGNOSIS — D69.6 THROMBOCYTOPENIA, UNSPECIFIED: ICD-10-CM

## 2022-06-07 DIAGNOSIS — R35.1 NOCTURIA: ICD-10-CM

## 2022-06-07 DIAGNOSIS — M16.11 PRIMARY OSTEOARTHRITIS OF RIGHT HIP: ICD-10-CM

## 2022-06-07 DIAGNOSIS — G89.4 CHRONIC PAIN SYNDROME: ICD-10-CM

## 2022-06-07 DIAGNOSIS — I25.10 CORONARY ARTERIOSCLEROSIS IN NATIVE ARTERY: ICD-10-CM

## 2022-06-07 DIAGNOSIS — F51.01 PRIMARY INSOMNIA: ICD-10-CM

## 2022-06-07 DIAGNOSIS — E78.2 MIXED HYPERLIPIDEMIA: ICD-10-CM

## 2022-06-07 DIAGNOSIS — N18.31 TYPE 2 DIABETES MELLITUS WITH STAGE 3A CHRONIC KIDNEY DISEASE, WITHOUT LONG-TERM CURRENT USE OF INSULIN: ICD-10-CM

## 2022-06-07 DIAGNOSIS — E66.01 MORBID (SEVERE) OBESITY DUE TO EXCESS CALORIES: ICD-10-CM

## 2022-06-07 DIAGNOSIS — I10 ESSENTIAL HYPERTENSION: Primary | ICD-10-CM

## 2022-06-07 DIAGNOSIS — K21.9 GASTROESOPHAGEAL REFLUX DISEASE WITHOUT ESOPHAGITIS: ICD-10-CM

## 2022-06-07 DIAGNOSIS — E11.22 TYPE 2 DIABETES MELLITUS WITH STAGE 3A CHRONIC KIDNEY DISEASE, WITHOUT LONG-TERM CURRENT USE OF INSULIN: ICD-10-CM

## 2022-06-07 DIAGNOSIS — I20.89 STABLE ANGINA PECTORIS: ICD-10-CM

## 2022-06-07 LAB
ESTIMATED AVG GLUCOSE: 111 MG/DL (ref 68–131)
HBA1C MFR BLD: 5.5 % (ref 4–5.6)

## 2022-06-07 PROCEDURE — 99214 OFFICE O/P EST MOD 30 MIN: CPT | Mod: PBBFAC,PN | Performed by: STUDENT IN AN ORGANIZED HEALTH CARE EDUCATION/TRAINING PROGRAM

## 2022-06-07 PROCEDURE — 99999 PR PBB SHADOW E&M-EST. PATIENT-LVL IV: CPT | Mod: PBBFAC,,, | Performed by: STUDENT IN AN ORGANIZED HEALTH CARE EDUCATION/TRAINING PROGRAM

## 2022-06-07 PROCEDURE — 99214 PR OFFICE/OUTPT VISIT, EST, LEVL IV, 30-39 MIN: ICD-10-PCS | Mod: S$PBB,,, | Performed by: STUDENT IN AN ORGANIZED HEALTH CARE EDUCATION/TRAINING PROGRAM

## 2022-06-07 PROCEDURE — 99214 OFFICE O/P EST MOD 30 MIN: CPT | Mod: S$PBB,,, | Performed by: STUDENT IN AN ORGANIZED HEALTH CARE EDUCATION/TRAINING PROGRAM

## 2022-06-07 PROCEDURE — 99999 PR PBB SHADOW E&M-EST. PATIENT-LVL IV: ICD-10-PCS | Mod: PBBFAC,,, | Performed by: STUDENT IN AN ORGANIZED HEALTH CARE EDUCATION/TRAINING PROGRAM

## 2022-06-07 PROCEDURE — 83036 HEMOGLOBIN GLYCOSYLATED A1C: CPT | Performed by: STUDENT IN AN ORGANIZED HEALTH CARE EDUCATION/TRAINING PROGRAM

## 2022-06-07 RX ORDER — ROSUVASTATIN CALCIUM 5 MG/1
5 TABLET, COATED ORAL DAILY
Qty: 90 TABLET | Refills: 3 | Status: SHIPPED | OUTPATIENT
Start: 2022-06-07 | End: 2022-12-07 | Stop reason: SDUPTHER

## 2022-06-07 RX ORDER — CELECOXIB 200 MG/1
200 CAPSULE ORAL DAILY
Qty: 90 CAPSULE | Refills: 3 | Status: SHIPPED | OUTPATIENT
Start: 2022-06-07 | End: 2022-12-07 | Stop reason: SDUPTHER

## 2022-06-07 RX ORDER — METFORMIN HYDROCHLORIDE 500 MG/1
TABLET, EXTENDED RELEASE ORAL
Qty: 270 TABLET | Refills: 3 | Status: SHIPPED | OUTPATIENT
Start: 2022-06-07 | End: 2022-12-07 | Stop reason: SDUPTHER

## 2022-06-07 RX ORDER — LANCETS
EACH MISCELLANEOUS
Qty: 100 EACH | Refills: 3 | Status: SHIPPED | OUTPATIENT
Start: 2022-06-07 | End: 2022-12-07 | Stop reason: SDUPTHER

## 2022-06-07 NOTE — ASSESSMENT & PLAN NOTE
Diabetes Medications             metFORMIN (GLUCOPHAGE-XR) 500 MG ER 24hr tablet Take one by mouth in the morning and two by mouth in the evening.

## 2022-06-07 NOTE — ASSESSMENT & PLAN NOTE
Hyperlipidemia Medications             rosuvastatin (CRESTOR) 5 MG tablet Take 1 tablet (5 mg total) by mouth once daily.

## 2022-06-07 NOTE — PROGRESS NOTES
Subjective:       Patient ID: Lei Hsu is a 67 y.o. male.    Chief Complaint: Follow-up (6 month ) and Diabetes     Essential hypertension - Primary   BP Readings from Last 3 Encounters:  06/07/22 : 128/76  03/07/22 : 138/88  02/15/22 : 118/68    Hypertension Medications     isosorbide mononitrate (IMDUR) 30 MG 24 hr tablet Take 1 tablet (30 mg total) by mouth once daily.         Mixed hyperlipidemia   Lab Results       Component                Value               Date                       CHOL                     122                 02/18/2021            Lab Results       Component                Value               Date                       HDL                      34 (L)              02/18/2021            Lab Results       Component                Value               Date                       LDLCALC                  62.8 (L)            02/18/2021            Lab Results       Component                Value               Date                       TRIG                     126                 02/18/2021            Lab Results       Component                Value               Date                       CHOLHDL                  27.9                02/18/2021              Hyperlipidemia Medications     rosuvastatin (CRESTOR) 5 MG tablet Take 1 tablet (5 mg total) by mouth once daily.         Thrombocytopenia, unspecified - stable  Lab Results      Component                Value               Date                      WBC                      3.23 (L)            02/11/2022                HGB                      10.8 (L)            02/11/2022                HCT                      35.0 (L)            02/11/2022                MCV                      82                  02/11/2022                PLT                      104 (L)             02/11/2022                 Type 2 diabetes mellitus with stage 3a chronic kidney disease, without long-term current use of insulin   Lab Results       Component                 Value               Date                       HGBA1C                   5.8 (H)             11/22/2021            Diabetes Medications     metFORMIN (GLUCOPHAGE-XR) 500 MG ER 24hr tablet Take one by mouth in the morning and two by mouth in the evening.         Morbid (severe) obesity due to excess calories   Wt Readings from Last 10 Encounters:  06/07/22 : 97.7 kg (215 lb 6.2 oz)  03/07/22 : 103.4 kg (228 lb)  02/15/22 : 99.8 kg (220 lb)  02/11/22 : 99.8 kg (220 lb)  01/31/22 : 100.7 kg (222 lb)  12/01/21 : 111.1 kg (245 lb)  11/22/21 : 105.7 kg (233 lb)  07/27/21 : 111.1 kg (245 lb)  07/05/21 : 113.4 kg (250 lb)  07/02/21 : 113.9 kg (251 lb 3.2 oz)        GERD (gastroesophageal reflux disease) - on chronic prilosec.      CAD with angina- recently had a heart cath with cardiology with good results.  Chest pain has improved.     Review of Systems   Constitutional: Negative for activity change, appetite change, fatigue and unexpected weight change.   HENT: Negative for trouble swallowing.    Respiratory: Negative for shortness of breath.    Cardiovascular: Negative for chest pain, palpitations and leg swelling.   Gastrointestinal: Negative for abdominal pain, blood in stool, change in bowel habit and change in bowel habit.   Endocrine: Negative for cold intolerance, heat intolerance, polydipsia and polyuria.   Genitourinary: Negative for decreased urine volume, difficulty urinating, dysuria, frequency, hematuria and urgency.   Musculoskeletal: Negative for arthralgias, back pain and gait problem.   Integumentary:  Negative for rash and wound.   Neurological: Negative for dizziness, weakness and headaches.   Psychiatric/Behavioral: Positive for sleep disturbance. Negative for dysphoric mood.         Objective:      Physical Exam  Constitutional:       General: He is not in acute distress.     Appearance: Normal appearance. He is not ill-appearing.   Eyes:      Conjunctiva/sclera: Conjunctivae normal.    Cardiovascular:      Rate and Rhythm: Normal rate and regular rhythm.      Pulses: Normal pulses.      Heart sounds: Normal heart sounds. No murmur heard.  Pulmonary:      Effort: Pulmonary effort is normal. No respiratory distress.      Breath sounds: Normal breath sounds. No wheezing.   Abdominal:      Palpations: Abdomen is soft.   Musculoskeletal:      Right lower leg: No edema.      Left lower leg: No edema.   Skin:     General: Skin is warm and dry.      Findings: No rash.   Neurological:      Mental Status: He is alert. Mental status is at baseline.      Gait: Gait normal.   Psychiatric:         Mood and Affect: Mood normal.         Behavior: Behavior normal.         Thought Content: Thought content normal.         Judgment: Judgment normal.         Assessment:       1. Essential hypertension    2. Mixed hyperlipidemia    3. Type 2 diabetes mellitus with stage 3a chronic kidney disease, without long-term current use of insulin    4. Gastroesophageal reflux disease without esophagitis    5. Morbid (severe) obesity due to excess calories    6. Thrombocytopenia, unspecified    7. Coronary arteriosclerosis in native artery    8. Stable angina pectoris    9. Primary osteoarthritis of right hip    10. Chronic pain syndrome    11. Primary insomnia    12. Nocturia        Plan:       Problem List Items Addressed This Visit        Neuro    Chronic pain    Relevant Medications    celecoxib (CELEBREX) 200 MG capsule       Cardiac/Vascular    Essential hypertension - Primary     Hypertension Medications             isosorbide mononitrate (IMDUR) 30 MG 24 hr tablet Take 1 tablet (30 mg total) by mouth once daily.                   Mixed hyperlipidemia     Hyperlipidemia Medications             rosuvastatin (CRESTOR) 5 MG tablet Take 1 tablet (5 mg total) by mouth once daily.                   Relevant Medications    rosuvastatin (CRESTOR) 5 MG tablet    Coronary arteriosclerosis in native artery     Continue cardiology  followup and imdur           Stable angina pectoris       Renal/    Nocturia     Has been on flomax that helped some but does not want it currently              Hematology    Thrombocytopenia, unspecified     Stable                Endocrine    Type 2 diabetes mellitus with stage 3a chronic kidney disease, without long-term current use of insulin     Diabetes Medications             metFORMIN (GLUCOPHAGE-XR) 500 MG ER 24hr tablet Take one by mouth in the morning and two by mouth in the evening.                   Relevant Medications    lancets (LANCETS,ULTRA THIN) Misc    blood sugar diagnostic Strp    metFORMIN (GLUCOPHAGE-XR) 500 MG ER 24hr tablet    Other Relevant Orders    Hemoglobin A1C    Morbid (severe) obesity due to excess calories     Continue weight management, diet, exercise              GI    GERD (gastroesophageal reflux disease)     Continue prn therapy              Orthopedic    Osteoarthritis    Relevant Medications    celecoxib (CELEBREX) 200 MG capsule       Other    Primary insomnia     He has been on multiple sleep medications with either no effect or puts him to sleep for days

## 2022-06-07 NOTE — PATIENT INSTRUCTIONS

## 2022-06-13 ENCOUNTER — TELEPHONE (OUTPATIENT)
Dept: FAMILY MEDICINE | Facility: CLINIC | Age: 67
End: 2022-06-13
Payer: MEDICARE

## 2022-06-13 NOTE — TELEPHONE ENCOUNTER
----- Message from Judi Taylor sent at 6/13/2022 10:45 AM CDT -----  Contact: mattie  Type:  Pharmacy Calling to Clarify an RX    Name of Caller:  Mattie  Pharmacy Name:    [facility] [place], [address]  [place] [address]  Phone: [phone] Fax: [phone]  Prescription Name:  diabetic testing supplies  What do they need to clarify?:  needs most recent notes regarding this order  Best Call Back Number:  [phone]  Additional Information:

## 2022-07-04 ENCOUNTER — PATIENT MESSAGE (OUTPATIENT)
Dept: ADMINISTRATIVE | Facility: HOSPITAL | Age: 67
End: 2022-07-04
Payer: MEDICARE

## 2022-07-06 DIAGNOSIS — E11.9 TYPE 2 DIABETES MELLITUS WITHOUT COMPLICATION: ICD-10-CM

## 2022-07-11 ENCOUNTER — PATIENT MESSAGE (OUTPATIENT)
Dept: ADMINISTRATIVE | Facility: HOSPITAL | Age: 67
End: 2022-07-11
Payer: MEDICARE

## 2022-08-24 ENCOUNTER — PATIENT MESSAGE (OUTPATIENT)
Dept: ADMINISTRATIVE | Facility: HOSPITAL | Age: 67
End: 2022-08-24
Payer: MEDICARE

## 2022-09-15 ENCOUNTER — PATIENT MESSAGE (OUTPATIENT)
Dept: ADMINISTRATIVE | Facility: HOSPITAL | Age: 67
End: 2022-09-15
Payer: MEDICARE

## 2022-10-06 ENCOUNTER — TELEPHONE (OUTPATIENT)
Dept: FAMILY MEDICINE | Facility: CLINIC | Age: 67
End: 2022-10-06
Payer: MEDICARE

## 2022-10-11 ENCOUNTER — PATIENT MESSAGE (OUTPATIENT)
Dept: ADMINISTRATIVE | Facility: HOSPITAL | Age: 67
End: 2022-10-11
Payer: MEDICARE

## 2022-10-12 ENCOUNTER — OFFICE VISIT (OUTPATIENT)
Dept: CARDIOLOGY | Facility: CLINIC | Age: 67
End: 2022-10-12
Payer: MEDICARE

## 2022-10-12 VITALS
HEIGHT: 72 IN | HEART RATE: 80 BPM | DIASTOLIC BLOOD PRESSURE: 70 MMHG | BODY MASS INDEX: 29.56 KG/M2 | WEIGHT: 218.25 LBS | SYSTOLIC BLOOD PRESSURE: 110 MMHG

## 2022-10-12 DIAGNOSIS — E11.9 TYPE 2 DIABETES MELLITUS WITHOUT COMPLICATION, WITHOUT LONG-TERM CURRENT USE OF INSULIN: Primary | ICD-10-CM

## 2022-10-12 DIAGNOSIS — E78.2 MIXED HYPERLIPIDEMIA: ICD-10-CM

## 2022-10-12 DIAGNOSIS — I25.10 CORONARY ARTERY DISEASE INVOLVING NATIVE CORONARY ARTERY OF NATIVE HEART WITHOUT ANGINA PECTORIS: ICD-10-CM

## 2022-10-12 DIAGNOSIS — I73.9 PVD (PERIPHERAL VASCULAR DISEASE) WITH CLAUDICATION: ICD-10-CM

## 2022-10-12 PROCEDURE — 99214 OFFICE O/P EST MOD 30 MIN: CPT | Mod: S$GLB,,, | Performed by: INTERNAL MEDICINE

## 2022-10-12 PROCEDURE — 99214 PR OFFICE/OUTPT VISIT, EST, LEVL IV, 30-39 MIN: ICD-10-PCS | Mod: S$GLB,,, | Performed by: INTERNAL MEDICINE

## 2022-10-12 NOTE — PROGRESS NOTES
Subjective:    Patient ID:  Lei Hsu is a 67 y.o. male patient here for evaluation Coronary Artery Disease, Hyperlipidemia, and Hypertension      History of Present Illness:       Patient is here for follow-up visit  Denies chest pain or shortness of breath.  Denies recent fever cough chills or congestion.  Denies blood in the urine or blood in the stool.  Denies myalgias  Denies orthopnea or peripheral edema  Denies nausea vomiting or dyspepsia  No recent arm neck or jaw pain.          Review of patient's allergies indicates:  No Known Allergies    Past Medical History:   Diagnosis Date    Atherosclerosis     CAD (coronary artery disease) 1/26/2021    Cardiac murmur     DM (diabetes mellitus) 1/26/2021    Hyperlipidemia     Hypertension     Tobacco abuse      Past Surgical History:   Procedure Laterality Date    BACK SURGERY      L3-L4    CORONARY ANGIOGRAPHY N/A 2/15/2022    Procedure: ANGIOGRAM, CORONARY ARTERY;  Surgeon: Howard Burton MD;  Location: [facility] CATH/EP LAB;  Service: Cardiology;  Laterality: N/A;    KNEE ARTHROSCOPY      KNEE SURGERY      LEFT HEART CATHETERIZATION Left 2/15/2022    Procedure: CATHETERIZATION, HEART, LEFT;  Surgeon: Howard Burton MD;  Location: [facility] CATH/EP LAB;  Service: Cardiology;  Laterality: Left;     Social History     Tobacco Use    Smoking status: Every Day     Packs/day: 1.00     Years: 40.00     Pack years: 40.00     Types: Vaping with nicotine, Cigarettes    Smokeless tobacco: Never   Substance Use Topics    Alcohol use: No    Drug use: No        Review of Systems:    As noted in HPI in addition      REVIEW OF SYSTEMS  CARDIOVASCULAR: No recent chest pain, palpitations, arm, neck, or jaw pain  RESPIRATORY: No recent fever, cough chills, SOB or congestion  : No blood in the urine  GI: No Nausea, vomiting, constipation, diarrhea, blood, or reflux.  MUSCULOSKELETAL: No myalgias  NEURO: No lightheadedness or dizziness  EYES: No Double vision, blurry, vision or  headache              Objective        Vitals:    10/12/22 1341   BP: 110/70   Pulse: 80       LIPIDS - LAST 2   Lab Results   Component Value Date    CHOL 122 02/18/2021    HDL 34 (L) 02/18/2021    LDLCALC 62.8 (L) 02/18/2021    TRIG 126 02/18/2021    CHOLHDL 27.9 02/18/2021       CBC - LAST 2  Lab Results   Component Value Date    WBC 3.23 (L) 02/11/2022    WBC 6.73 07/05/2021    RBC 4.28 (L) 02/11/2022    RBC 4.75 07/05/2021    HGB 10.8 (L) 02/11/2022    HGB 11.0 (L) 07/05/2021    HCT 35.0 (L) 02/11/2022    HCT 36.6 (L) 07/05/2021    MCV 82 02/11/2022    MCV 77 (L) 07/05/2021    MCH 25.2 (L) 02/11/2022    MCH 23.2 (L) 07/05/2021    MCHC 30.9 (L) 02/11/2022    MCHC 30.1 (L) 07/05/2021    RDW 14.4 02/11/2022    RDW 15.5 (H) 07/05/2021     (L) 02/11/2022     (L) 07/05/2021    MPV 10.8 02/11/2022    MPV 10.4 07/05/2021    GRAN 1.6 (L) 02/11/2022    GRAN 50.1 02/11/2022    LYMPH 1.1 02/11/2022    LYMPH 35.0 02/11/2022    MONO 0.4 02/11/2022    MONO 12.1 02/11/2022    BASO 0.02 02/11/2022    BASO 0.03 07/05/2021    NRBC 0 02/11/2022    NRBC 0 07/05/2021       CHEMISTRY & LIVER FUNCTION - LAST 2  Lab Results   Component Value Date     02/11/2022     07/20/2021    K 4.3 02/11/2022    K 4.5 07/20/2021     02/11/2022     07/20/2021    CO2 26 02/11/2022    CO2 28 07/20/2021    ANIONGAP 9 02/11/2022    ANIONGAP 8 07/20/2021    BUN 18 02/11/2022    BUN 17 07/20/2021    CREATININE 1.1 02/11/2022    CREATININE 1.3 07/20/2021     (H) 02/11/2022     07/20/2021    CALCIUM 8.5 (L) 02/11/2022    CALCIUM 9.3 07/20/2021    ALBUMIN 4.2 07/05/2021    ALBUMIN 4.3 02/18/2021    PROT 7.6 07/05/2021    PROT 7.9 02/18/2021    ALKPHOS 124 07/05/2021    ALKPHOS 179 (H) 02/18/2021    ALT 27 07/05/2021    ALT 31 02/18/2021    AST 26 07/05/2021    AST 27 02/18/2021    BILITOT 0.9 07/05/2021    BILITOT 0.5 02/18/2021        CARDIAC PROFILE - LAST 2  No results found for: BNP, CPK, CPKMB, LDH,  TROPONINI     COAGULATION - LAST 2  No results found for: LABPT, INR, APTT    ENDOCRINE & PSA - LAST 2  Lab Results   Component Value Date    HGBA1C 5.5 06/07/2022    HGBA1C 5.8 (H) 11/22/2021    TSH 11.386 (H) 06/15/2021    PSA 0.86 07/20/2021        ECHOCARDIOGRAM RESULTS  Results for orders placed during the hospital encounter of 12/01/21    Echo    Interpretation Summary  · The left ventricle is normal in size with mild concentric hypertrophy and normal systolic function.  · The estimated ejection fraction is 62%.  · Normal left ventricular diastolic function.  · Normal right ventricular size with normal right ventricular systolic function.  · Mild left atrial enlargement.  · Normal central venous pressure (3 mmHg).  · The estimated PA systolic pressure is 24 mmHg.  · Mild tricuspid regurgitation.      CURRENT/PREVIOUS VISIT EKG  Results for orders placed or performed during the hospital encounter of 02/15/22   EKG 12-LEAD on arrival to floor    Collection Time: 02/15/22  9:57 AM    Narrative    Test Reason : I25.10,    Vent. Rate : 061 BPM     Atrial Rate : 061 BPM     P-R Int : 118 ms          QRS Dur : 086 ms      QT Int : 426 ms       P-R-T Axes : 023 118 094 degrees     QTc Int : 428 ms    Sinus rhythm with Premature atrial complexes  Lateral infarct ,age undetermined  Abnormal ECG  When compared with ECG of 11-FEB-2022 13:12,  Premature atrial complexes are now Present  Lateral infarct is now Present  Confirmed by Ashutosh MIRZA, Kg PILLAI ([id number]) on 2/20/2022 5:54:22 PM    Referred By:             Confirmed By:Kg Boykin MD     No valid procedures specified.   Results for orders placed during the hospital encounter of 12/01/21    Nuclear Stress - Cardiology Interpreted    Interpretation Summary    Abnormal myocardial perfusion scan.    There are two defects.    Defect #1 - There is a moderate to severe intensity, moderate to large sized, mostly reversible with some fixed areas defect in the mid to apical  inferior and inferoseptal wall(s).    Defect #2 - There is a small to moderate sized, moderate intensity, mostly fixed with some reversibilty defect in the basal to apical anterior and anteroapical wall(s) in the typical distribution of the LAD territory.    The gated perfusion images showed an ejection fraction of 57% post stress. Normal ejection fraction is greater than 53%.    There is normal wall motion post stress.    LV cavity size is  and normal at stress.    The EKG portion of this study is negative for ischemia.    The patient reported no chest pain during the stress test.    During stress, occasional PACs are noted.    Recommend further cardiac evaluation.        The left ventricular systolic function was normal.  The pre-procedure left ventricular end diastolic pressure was 3.  The post-procedure left ventricular end diastolic pressure was 4.  The ejection fraction was calculated to be 65%.  There was trivial (1+) mitral regurgitation.  The estimated blood loss was <50 mL.  There was non-obstructive coronary artery disease..       PHYSICAL EXAM  CONSTITUTIONAL: Well built, well nourished in no apparent distress  NECK: no carotid bruit, no JVD  LUNGS: CTA  CHEST WALL: no tenderness  HEART: regular rate and rhythm, S1, S2 normal, no murmur, click, rub or gallop   ABDOMEN: soft, non-tender; bowel sounds normal; no masses,  no organomegaly  EXTREMITIES: Extremities normal, no edema, no calf tenderness noted  NEURO: AAO X 3    I HAVE REVIEWED :    The vital signs, nurses notes, and all the pertinent radiology and labs.        Current Outpatient Medications   Medication Instructions    aspirin (ECOTRIN) 81 mg, Oral, 2 times daily    blood sugar diagnostic Strp Has an Embrace Talk meter- Use daily to check glucose. 99m need. f2f 6/7/22. E11.22    celecoxib (CELEBREX) 200 mg, Oral, Daily    diphenhydrAMINE (SOMINEX) 25 mg, Oral, Nightly PRN, Pt takes 1 1/2 tabs for sleep at night    gabapentin (NEURONTIN) 300 mg,  Oral, 2 times daily    isosorbide mononitrate (IMDUR) 30 mg, Oral, Daily    ketoconazole (NIZORAL) 2 % cream APPLY TOPICALLY TWICE DAILY FOR jock itch    lancets (LANCETS,ULTRA THIN) Misc Use daily to check glucose. 99m need. f2f 6/7/22. E11.22    metFORMIN (GLUCOPHAGE-XR) 500 MG ER 24hr tablet Take one by mouth in the morning and two by mouth in the evening.    MS CONTIN 30 mg, Oral, 2 times daily PRN    omeprazole (PRILOSEC) 40 mg, Oral, Daily    oxyCODONE (ROXICODONE) 20 mg Tab immediate release tablet 1 tablet, Oral, 4 times daily PRN    rosuvastatin (CRESTOR) 5 mg, Oral, Daily          Assessment & Plan     Mixed hyperlipidemia  Continue Crestor 5 mg daily    PVD (peripheral vascular disease) with claudication  Stable    Coronary artery disease involving native coronary artery of native heart without angina pectoris  No angina  Continue current regimen doing well.     Type 2 diabetes mellitus without complication, without long-term current use of insulin  Per PCP          No follow-ups on file.

## 2022-10-27 LAB
LEFT EYE DM RETINOPATHY: NEGATIVE
RIGHT EYE DM RETINOPATHY: NEGATIVE

## 2022-10-31 ENCOUNTER — PATIENT MESSAGE (OUTPATIENT)
Dept: FAMILY MEDICINE | Facility: CLINIC | Age: 67
End: 2022-10-31
Payer: MEDICARE

## 2022-12-07 ENCOUNTER — OFFICE VISIT (OUTPATIENT)
Dept: FAMILY MEDICINE | Facility: CLINIC | Age: 67
End: 2022-12-07
Payer: MEDICARE

## 2022-12-07 VITALS
HEIGHT: 72 IN | TEMPERATURE: 98 F | BODY MASS INDEX: 29.86 KG/M2 | OXYGEN SATURATION: 95 % | HEART RATE: 82 BPM | DIASTOLIC BLOOD PRESSURE: 80 MMHG | SYSTOLIC BLOOD PRESSURE: 122 MMHG | WEIGHT: 220.44 LBS

## 2022-12-07 DIAGNOSIS — Z76.89 ESTABLISHING CARE WITH NEW DOCTOR, ENCOUNTER FOR: Primary | ICD-10-CM

## 2022-12-07 DIAGNOSIS — G47.00 INSOMNIA, UNSPECIFIED TYPE: ICD-10-CM

## 2022-12-07 DIAGNOSIS — N18.31 TYPE 2 DIABETES MELLITUS WITH STAGE 3A CHRONIC KIDNEY DISEASE, WITHOUT LONG-TERM CURRENT USE OF INSULIN: ICD-10-CM

## 2022-12-07 DIAGNOSIS — E78.2 MIXED HYPERLIPIDEMIA: ICD-10-CM

## 2022-12-07 DIAGNOSIS — M16.11 PRIMARY OSTEOARTHRITIS OF RIGHT HIP: ICD-10-CM

## 2022-12-07 DIAGNOSIS — E11.22 TYPE 2 DIABETES MELLITUS WITH STAGE 3A CHRONIC KIDNEY DISEASE, WITHOUT LONG-TERM CURRENT USE OF INSULIN: ICD-10-CM

## 2022-12-07 DIAGNOSIS — R53.83 FATIGUE, UNSPECIFIED TYPE: ICD-10-CM

## 2022-12-07 DIAGNOSIS — G89.4 CHRONIC PAIN SYNDROME: ICD-10-CM

## 2022-12-07 PROCEDURE — 99214 PR OFFICE/OUTPT VISIT, EST, LEVL IV, 30-39 MIN: ICD-10-PCS | Mod: ,,, | Performed by: FAMILY MEDICINE

## 2022-12-07 PROCEDURE — G0008 FLU VACCINE - QUADRIVALENT - ADJUVANTED: ICD-10-PCS | Mod: ,,, | Performed by: FAMILY MEDICINE

## 2022-12-07 PROCEDURE — G0008 ADMIN INFLUENZA VIRUS VAC: HCPCS | Mod: ,,, | Performed by: FAMILY MEDICINE

## 2022-12-07 PROCEDURE — 90694 VACC AIIV4 NO PRSRV 0.5ML IM: CPT | Mod: ,,, | Performed by: FAMILY MEDICINE

## 2022-12-07 PROCEDURE — 99214 OFFICE O/P EST MOD 30 MIN: CPT | Mod: ,,, | Performed by: FAMILY MEDICINE

## 2022-12-07 PROCEDURE — 90694 FLU VACCINE - QUADRIVALENT - ADJUVANTED: ICD-10-PCS | Mod: ,,, | Performed by: FAMILY MEDICINE

## 2022-12-07 RX ORDER — LANCETS
EACH MISCELLANEOUS
Qty: 100 EACH | Refills: 3 | Status: SHIPPED | OUTPATIENT
Start: 2022-12-07

## 2022-12-07 RX ORDER — OMEPRAZOLE 40 MG/1
40 CAPSULE, DELAYED RELEASE ORAL DAILY
Qty: 30 CAPSULE | Refills: 11 | Status: CANCELLED | OUTPATIENT
Start: 2022-12-07 | End: 2023-12-07

## 2022-12-07 RX ORDER — METFORMIN HYDROCHLORIDE 500 MG/1
TABLET, EXTENDED RELEASE ORAL
Qty: 120 TABLET | Refills: 11 | Status: SHIPPED | OUTPATIENT
Start: 2022-12-07 | End: 2023-12-13

## 2022-12-07 RX ORDER — MIRTAZAPINE 30 MG/1
30 TABLET, FILM COATED ORAL NIGHTLY
Qty: 30 TABLET | Refills: 11 | Status: SHIPPED | OUTPATIENT
Start: 2022-12-07 | End: 2022-12-07

## 2022-12-07 RX ORDER — ROSUVASTATIN CALCIUM 5 MG/1
5 TABLET, COATED ORAL DAILY
Qty: 30 TABLET | Refills: 11 | Status: SHIPPED | OUTPATIENT
Start: 2022-12-07 | End: 2023-12-13

## 2022-12-07 RX ORDER — CELECOXIB 200 MG/1
200 CAPSULE ORAL DAILY
Qty: 30 CAPSULE | Refills: 11 | Status: SHIPPED | OUTPATIENT
Start: 2022-12-07 | End: 2023-12-13

## 2022-12-07 RX ORDER — MIRTAZAPINE 30 MG/1
30 TABLET, FILM COATED ORAL NIGHTLY
Qty: 30 TABLET | Refills: 11 | Status: SHIPPED | OUTPATIENT
Start: 2022-12-07 | End: 2023-06-06

## 2022-12-07 NOTE — PROGRESS NOTES
Subjective:       Patient ID: Lei Hsu is a 67 y.o. male.    Chief Complaint: Follow-up, Hypertension, and Diabetes (Pt here to est.care,was seeing Dr Mason)    Mr Patient Active Problem List  Diagnosis  · PVD (peripheral vascular disease) with claudication  · Essential hypertension  · Mixed hyperlipidemia  · Tobacco use    Mr Hsu is here to establish care. He has few complaints except for excessive anxiety which interrupts his sleep.His wife has COPD and is in  a wheelchair due to her mordbid obesity. He stresses about leaving her alone.   His medical diagnosis are as follows:                Obesity with body mass index 30 or greater  · SOB (shortness of breath)  · Encounter for pre-operative cardiovascular clearance  · Osteoarthritis of right knee  · Status post total right knee replacement  · Coronary artery disease involving native coronary artery of native heart without angina pectoris  · Type 2 diabetes mellitus without complication, without long-term current use of insulin  · Chronic pain  · S/P coronary artery stent placement  · Peripheral vascular disorder due to diabetes mellitus  · Osteoarthritis  · Diabetic nephropathy associated with type 2 diabetes mellitus  · Jock itch  · Long term (current) use of opiate analgesic  · Stable angina pectoris  · Dyspepsia  · Chronic right hip pain  · Positive cardiac stress test  · GERD (gastroesophageal reflux disease)  · Morbid (severe) obesity due to excess calories  · Thrombocytopenia, unspecified  · Primary insomnia  · Nocturia      Follow-up  Associated symptoms include arthralgias and myalgias. Pertinent negatives include no abdominal pain, chest pain, chills, congestion, coughing, diaphoresis, fever, nausea, rash or sore throat.   Hypertension  Pertinent negatives include no chest pain, palpitations or shortness of breath.   Diabetes  Hypoglycemia symptoms include dizziness. Pertinent negatives for diabetes include no chest pain.   Review of Systems    Constitutional:  Negative for activity change, appetite change, chills, diaphoresis and fever.   HENT:  Negative for congestion, ear pain, postnasal drip, rhinorrhea and sore throat.    Eyes:  Negative for pain, discharge, redness and itching.   Respiratory:  Negative for cough and shortness of breath.    Cardiovascular:  Negative for chest pain, palpitations and leg swelling.   Gastrointestinal:  Negative for abdominal distention, abdominal pain, constipation, diarrhea and nausea.   Genitourinary:  Negative for difficulty urinating, dysuria, frequency and urgency.   Musculoskeletal:  Positive for arthralgias, back pain, gait problem and myalgias.        Patient states he needs a hip replacement but he doesn't want to do it.   Skin:  Negative for color change, rash and wound.   Allergic/Immunologic: Negative.    Neurological:  Positive for dizziness.        Occasional postural hypotension   Hematological:  Bruises/bleeds easily.   Psychiatric/Behavioral: Negative.     All other systems reviewed and are negative.    Patient Active Problem List   Diagnosis    PVD (peripheral vascular disease) with claudication    Essential hypertension    Mixed hyperlipidemia    Tobacco use    Obesity with body mass index 30 or greater    SOB (shortness of breath)    Encounter for pre-operative cardiovascular clearance    Osteoarthritis of right knee    Status post total right knee replacement    Coronary artery disease involving native coronary artery of native heart without angina pectoris    Type 2 diabetes mellitus without complication, without long-term current use of insulin    Chronic pain    S/P coronary artery stent placement    Peripheral vascular disorder due to diabetes mellitus    Osteoarthritis    Diabetic nephropathy associated with type 2 diabetes mellitus    Jock itch    Long term (current) use of opiate analgesic    Stable angina pectoris    Dyspepsia    Chronic right hip pain    Positive cardiac stress test     GERD (gastroesophageal reflux disease)    Morbid (severe) obesity due to excess calories    Thrombocytopenia, unspecified    Primary insomnia    Nocturia       Objective:      Physical Exam  Vitals and nursing note reviewed.   Constitutional:       Appearance: Normal appearance. He is normal weight.   HENT:      Head: Normocephalic.      Nose: Nose normal.   Eyes:      Extraocular Movements: Extraocular movements intact.      Conjunctiva/sclera: Conjunctivae normal.      Pupils: Pupils are equal, round, and reactive to light.   Neck:      Vascular: No carotid bruit.   Cardiovascular:      Rate and Rhythm: Normal rate and regular rhythm.      Heart sounds: Normal heart sounds. No murmur heard.  Pulmonary:      Effort: Pulmonary effort is normal. No respiratory distress.      Breath sounds: Normal breath sounds.   Musculoskeletal:         General: Normal range of motion.      Cervical back: Normal range of motion and neck supple.   Skin:     General: Skin is warm and dry.   Neurological:      General: No focal deficit present.      Mental Status: He is alert and oriented to person, place, and time.   Psychiatric:         Mood and Affect: Mood normal.         Behavior: Behavior normal.       Lab Results   Component Value Date    WBC 3.23 (L) 02/11/2022    HGB 10.8 (L) 02/11/2022    HCT 35.0 (L) 02/11/2022     (L) 02/11/2022    CHOL 122 02/18/2021    TRIG 126 02/18/2021    HDL 34 (L) 02/18/2021    ALT 27 07/05/2021    AST 26 07/05/2021     02/11/2022    K 4.3 02/11/2022     02/11/2022    CREATININE 1.1 02/11/2022    BUN 18 02/11/2022    CO2 26 02/11/2022    TSH 11.386 (H) 06/15/2021    PSA 0.86 07/20/2021    HGBA1C 5.5 06/07/2022     The ASCVD Risk score ([place] DK, et al., 2019) failed to calculate for the following reasons:    The valid total cholesterol range is 130 to 320 mg/dL  Visit Vitals  /80 (BP Location: Left arm, Patient Position: Sitting, BP Method: Medium (Manual))   Pulse 82   Temp  98.4 °F (36.9 °C)   Ht 6' (1.829 m)   Wt 100 kg (220 lb 7.4 oz)   SpO2 95%   BMI 29.90 kg/m²      Assessment:       1. Establishing care with new doctor, encounter for    2. Type 2 diabetes mellitus with stage 3a chronic kidney disease, without long-term current use of insulin    3. Primary osteoarthritis of right hip    4. Chronic pain syndrome    5. Mixed hyperlipidemia    6. Fatigue, unspecified type    7. Insomnia, unspecified type          Plan:       1. Establishing care with new doctor, encounter for  -     TSH; Future; Expected date: 12/07/2022  -     CBC Auto Differential; Future; Expected date: 12/07/2022    2. Type 2 diabetes mellitus with stage 3a chronic kidney disease, without long-term current use of insulin  -     blood sugar diagnostic Strp; Has an Embrace Talk meter- Use daily to check glucose. 99m need. f2f 6/7/22. E11.22  Dispense: 100 strip; Refill: 3  -     metFORMIN (GLUCOPHAGE-XR) 500 MG ER 24hr tablet; Take one by mouth in the morning and two by mouth in the evening.  Dispense: 120 tablet; Refill: 11  -     lancets (LANCETS,ULTRA THIN) Misc; Use daily to check glucose. 99m need. f2f 6/7/22. E11.22  Dispense: 100 each; Refill: 3  -     Hemoglobin A1C; Future; Expected date: 12/07/2022  -     Microalbumin/Creatinine Ratio, Urine  -     Comprehensive Metabolic Panel; Future; Expected date: 12/07/2022    3. Primary osteoarthritis of right hip  -     celecoxib (CELEBREX) 200 MG capsule; Take 1 capsule (200 mg total) by mouth once daily.  Dispense: 30 capsule; Refill: 11    4. Chronic pain syndrome  -     celecoxib (CELEBREX) 200 MG capsule; Take 1 capsule (200 mg total) by mouth once daily.  Dispense: 30 capsule; Refill: 11    5. Mixed hyperlipidemia  -     rosuvastatin (CRESTOR) 5 MG tablet; Take 1 tablet (5 mg total) by mouth once daily.  Dispense: 30 tablet; Refill: 11  -     Lipid Panel; Future; Expected date: 12/07/2022    6. Fatigue, unspecified type  -     TSH; Future; Expected date:  12/07/2022  -     CBC Auto Differential; Future; Expected date: 12/07/2022    7. Insomnia, unspecified type  -     Discontinue: mirtazapine (REMERON) 30 MG tablet; Take 1 tablet (30 mg total) by mouth every evening.  Dispense: 30 tablet; Refill: 11  -     mirtazapine (REMERON) 30 MG tablet; Take 1 tablet (30 mg total) by mouth every evening.  Dispense: 30 tablet; Refill: 11     Follow up in about 6 months (around 6/7/2023).      Future Appointments       Date Provider Specialty Appt Notes    12/8/2022  Lab labs    6/6/2023 Candy Linda MD Family Medicine 6 month ck up dm

## 2022-12-08 ENCOUNTER — LAB VISIT (OUTPATIENT)
Dept: LAB | Facility: CLINIC | Age: 67
End: 2022-12-08
Payer: MEDICARE

## 2022-12-08 DIAGNOSIS — N18.31 TYPE 2 DIABETES MELLITUS WITH STAGE 3A CHRONIC KIDNEY DISEASE, WITHOUT LONG-TERM CURRENT USE OF INSULIN: ICD-10-CM

## 2022-12-08 DIAGNOSIS — E11.9 TYPE 2 DIABETES MELLITUS WITHOUT COMPLICATION: ICD-10-CM

## 2022-12-08 DIAGNOSIS — Z76.89 ESTABLISHING CARE WITH NEW DOCTOR, ENCOUNTER FOR: ICD-10-CM

## 2022-12-08 DIAGNOSIS — E78.2 MIXED HYPERLIPIDEMIA: ICD-10-CM

## 2022-12-08 DIAGNOSIS — R53.83 FATIGUE, UNSPECIFIED TYPE: ICD-10-CM

## 2022-12-08 DIAGNOSIS — E11.22 TYPE 2 DIABETES MELLITUS WITH STAGE 3A CHRONIC KIDNEY DISEASE, WITHOUT LONG-TERM CURRENT USE OF INSULIN: ICD-10-CM

## 2022-12-08 LAB
ALBUMIN SERPL BCP-MCNC: 4.1 G/DL (ref 3.5–5.2)
ALP SERPL-CCNC: 159 U/L (ref 55–135)
ALT SERPL W/O P-5'-P-CCNC: 19 U/L (ref 10–44)
ANION GAP SERPL CALC-SCNC: 9 MMOL/L (ref 8–16)
AST SERPL-CCNC: 18 U/L (ref 10–40)
BASOPHILS # BLD AUTO: 0.02 K/UL (ref 0–0.2)
BASOPHILS NFR BLD: 0.5 % (ref 0–1.9)
BILIRUB SERPL-MCNC: 0.4 MG/DL (ref 0.1–1)
BUN SERPL-MCNC: 19 MG/DL (ref 8–23)
CALCIUM SERPL-MCNC: 9.1 MG/DL (ref 8.7–10.5)
CHLORIDE SERPL-SCNC: 105 MMOL/L (ref 95–110)
CHOLEST SERPL-MCNC: 125 MG/DL (ref 120–199)
CHOLEST/HDLC SERPL: 3.2 {RATIO} (ref 2–5)
CO2 SERPL-SCNC: 26 MMOL/L (ref 23–29)
CREAT SERPL-MCNC: 1 MG/DL (ref 0.5–1.4)
DIFFERENTIAL METHOD: ABNORMAL
EOSINOPHIL # BLD AUTO: 0 K/UL (ref 0–0.5)
EOSINOPHIL NFR BLD: 1.1 % (ref 0–8)
ERYTHROCYTE [DISTWIDTH] IN BLOOD BY AUTOMATED COUNT: 14.7 % (ref 11.5–14.5)
EST. GFR  (NO RACE VARIABLE): >60 ML/MIN/1.73 M^2
GLUCOSE SERPL-MCNC: 105 MG/DL (ref 70–110)
HCT VFR BLD AUTO: 37.8 % (ref 40–54)
HDLC SERPL-MCNC: 39 MG/DL (ref 40–75)
HDLC SERPL: 31.2 % (ref 20–50)
HGB BLD-MCNC: 11.2 G/DL (ref 14–18)
IMM GRANULOCYTES # BLD AUTO: 0.01 K/UL (ref 0–0.04)
IMM GRANULOCYTES NFR BLD AUTO: 0.3 % (ref 0–0.5)
LDLC SERPL CALC-MCNC: 63 MG/DL (ref 63–159)
LYMPHOCYTES # BLD AUTO: 1 K/UL (ref 1–4.8)
LYMPHOCYTES NFR BLD: 26.9 % (ref 18–48)
MCH RBC QN AUTO: 24.6 PG (ref 27–31)
MCHC RBC AUTO-ENTMCNC: 29.6 G/DL (ref 32–36)
MCV RBC AUTO: 83 FL (ref 82–98)
MONOCYTES # BLD AUTO: 0.4 K/UL (ref 0.3–1)
MONOCYTES NFR BLD: 9.8 % (ref 4–15)
NEUTROPHILS # BLD AUTO: 2.3 K/UL (ref 1.8–7.7)
NEUTROPHILS NFR BLD: 61.4 % (ref 38–73)
NONHDLC SERPL-MCNC: 86 MG/DL
NRBC BLD-RTO: 0 /100 WBC
PLATELET # BLD AUTO: 122 K/UL (ref 150–450)
PMV BLD AUTO: 11.1 FL (ref 9.2–12.9)
POTASSIUM SERPL-SCNC: 4.4 MMOL/L (ref 3.5–5.1)
PROT SERPL-MCNC: 7.7 G/DL (ref 6–8.4)
RBC # BLD AUTO: 4.56 M/UL (ref 4.6–6.2)
SODIUM SERPL-SCNC: 140 MMOL/L (ref 136–145)
T4 FREE SERPL-MCNC: 0.83 NG/DL (ref 0.71–1.51)
TRIGL SERPL-MCNC: 115 MG/DL (ref 30–150)
TSH SERPL DL<=0.005 MIU/L-ACNC: 5.64 UIU/ML (ref 0.4–4)
WBC # BLD AUTO: 3.68 K/UL (ref 3.9–12.7)

## 2022-12-08 PROCEDURE — 80053 COMPREHEN METABOLIC PANEL: CPT | Performed by: FAMILY MEDICINE

## 2022-12-08 PROCEDURE — 80061 LIPID PANEL: CPT | Performed by: FAMILY MEDICINE

## 2022-12-08 PROCEDURE — 83036 HEMOGLOBIN GLYCOSYLATED A1C: CPT | Performed by: FAMILY MEDICINE

## 2022-12-08 PROCEDURE — 84443 ASSAY THYROID STIM HORMONE: CPT | Performed by: FAMILY MEDICINE

## 2022-12-08 PROCEDURE — 85025 COMPLETE CBC W/AUTO DIFF WBC: CPT | Performed by: FAMILY MEDICINE

## 2022-12-08 PROCEDURE — 84439 ASSAY OF FREE THYROXINE: CPT | Performed by: FAMILY MEDICINE

## 2022-12-09 LAB
ALBUMIN/CREAT UR: 10.5 UG/MG (ref 0–30)
CREAT UR-MCNC: 133 MG/DL (ref 23–375)
ESTIMATED AVG GLUCOSE: 114 MG/DL (ref 68–131)
HBA1C MFR BLD: 5.6 % (ref 4–5.6)
MICROALBUMIN UR DL<=1MG/L-MCNC: 14 UG/ML

## 2023-04-11 ENCOUNTER — PATIENT MESSAGE (OUTPATIENT)
Dept: ADMINISTRATIVE | Facility: HOSPITAL | Age: 68
End: 2023-04-11
Payer: MEDICARE

## 2023-04-18 ENCOUNTER — TELEPHONE (OUTPATIENT)
Dept: FAMILY MEDICINE | Facility: CLINIC | Age: 68
End: 2023-04-18
Payer: MEDICARE

## 2023-04-18 NOTE — TELEPHONE ENCOUNTER
Called and spoke to pt about setting up AWV  Appt set up for 5/31/2023 @11:00am w/Ms Micaela at [facility]

## 2023-05-19 ENCOUNTER — TELEPHONE (OUTPATIENT)
Dept: FAMILY MEDICINE | Facility: CLINIC | Age: 68
End: 2023-05-19
Payer: MEDICARE

## 2023-05-19 NOTE — TELEPHONE ENCOUNTER
Paper lab orders were received for patient earlier today. Called patient to make aware - relayed information to patient that lab orders were received.

## 2023-05-19 NOTE — TELEPHONE ENCOUNTER
----- Message from [place] sent at 5/19/2023  3:30 PM CDT -----  Contact: PT WIFE  Type: ORDERS FOR LABS FAXED IN    Who Called:  PT WIFE  Best Call Back Number: [phone]  Additional Information: Requesting a call back regarding PATIENT IS CALLING TO SEE IF LAB ORDERS WAS RECEIVED AND TO CALL AND SCHEDULE THEM. PLEASE CALL HIM OR WIFE BACK.    Please Advise ---Thank you

## 2023-05-22 ENCOUNTER — LAB VISIT (OUTPATIENT)
Dept: LAB | Facility: CLINIC | Age: 68
End: 2023-05-22
Payer: MEDICARE

## 2023-05-22 DIAGNOSIS — R80.8 NEPHROGENOUS PROTEINURIA: ICD-10-CM

## 2023-05-22 DIAGNOSIS — N39.0 URINARY TRACT INFECTION, SITE NOT SPECIFIED: ICD-10-CM

## 2023-05-22 DIAGNOSIS — N18.2 CHRONIC KIDNEY DISEASE, STAGE II (MILD): Primary | ICD-10-CM

## 2023-05-22 LAB
ALBUMIN SERPL BCP-MCNC: 3.8 G/DL (ref 3.5–5.2)
ALBUMIN/CREAT UR: 9.9 UG/MG (ref 0–30)
ANION GAP SERPL CALC-SCNC: 10 MMOL/L (ref 8–16)
BACTERIA #/AREA URNS HPF: NORMAL /HPF
BASOPHILS # BLD AUTO: 0.02 K/UL (ref 0–0.2)
BASOPHILS NFR BLD: 0.5 % (ref 0–1.9)
BILIRUB UR QL STRIP: NEGATIVE
BUN SERPL-MCNC: 20 MG/DL (ref 8–23)
CALCIUM SERPL-MCNC: 8.7 MG/DL (ref 8.7–10.5)
CHLORIDE SERPL-SCNC: 108 MMOL/L (ref 95–110)
CLARITY UR: CLEAR
CO2 SERPL-SCNC: 24 MMOL/L (ref 23–29)
COLOR UR: YELLOW
CREAT SERPL-MCNC: 1.3 MG/DL (ref 0.5–1.4)
CREAT UR-MCNC: 274 MG/DL (ref 23–375)
CREAT UR-MCNC: 274 MG/DL (ref 23–375)
DIFFERENTIAL METHOD: ABNORMAL
EOSINOPHIL # BLD AUTO: 0 K/UL (ref 0–0.5)
EOSINOPHIL NFR BLD: 0.5 % (ref 0–8)
ERYTHROCYTE [DISTWIDTH] IN BLOOD BY AUTOMATED COUNT: 14.8 % (ref 11.5–14.5)
EST. GFR  (NO RACE VARIABLE): 60 ML/MIN/1.73 M^2
GLUCOSE SERPL-MCNC: 141 MG/DL (ref 70–110)
GLUCOSE UR QL STRIP: NEGATIVE
HCT VFR BLD AUTO: 33.1 % (ref 40–54)
HGB BLD-MCNC: 9.9 G/DL (ref 14–18)
HGB UR QL STRIP: NEGATIVE
IMM GRANULOCYTES # BLD AUTO: 0.01 K/UL (ref 0–0.04)
IMM GRANULOCYTES NFR BLD AUTO: 0.2 % (ref 0–0.5)
KETONES UR QL STRIP: NEGATIVE
LEUKOCYTE ESTERASE UR QL STRIP: NEGATIVE
LYMPHOCYTES # BLD AUTO: 1.2 K/UL (ref 1–4.8)
LYMPHOCYTES NFR BLD: 27.5 % (ref 18–48)
MAGNESIUM SERPL-MCNC: 1.9 MG/DL (ref 1.6–2.6)
MCH RBC QN AUTO: 25.2 PG (ref 27–31)
MCHC RBC AUTO-ENTMCNC: 29.9 G/DL (ref 32–36)
MCV RBC AUTO: 84 FL (ref 82–98)
MICROALBUMIN UR DL<=1MG/L-MCNC: 27 UG/ML
MICROSCOPIC COMMENT: NORMAL
MONOCYTES # BLD AUTO: 0.3 K/UL (ref 0.3–1)
MONOCYTES NFR BLD: 6.7 % (ref 4–15)
NEUTROPHILS # BLD AUTO: 2.8 K/UL (ref 1.8–7.7)
NEUTROPHILS NFR BLD: 64.6 % (ref 38–73)
NITRITE UR QL STRIP: NEGATIVE
NRBC BLD-RTO: 0 /100 WBC
PH UR STRIP: 5 [PH] (ref 5–8)
PHOSPHATE SERPL-MCNC: 2.6 MG/DL (ref 2.7–4.5)
PLATELET # BLD AUTO: 108 K/UL (ref 150–450)
PMV BLD AUTO: 11 FL (ref 9.2–12.9)
POTASSIUM SERPL-SCNC: 4.5 MMOL/L (ref 3.5–5.1)
PROT UR QL STRIP: NEGATIVE
PROT UR-MCNC: 29 MG/DL (ref 0–15)
PROT/CREAT UR: 0.11 MG/G{CREAT} (ref 0–0.2)
PTH-INTACT SERPL-MCNC: 122 PG/ML (ref 9–77)
RBC # BLD AUTO: 3.93 M/UL (ref 4.6–6.2)
SODIUM SERPL-SCNC: 142 MMOL/L (ref 136–145)
SP GR UR STRIP: 1.01 (ref 1–1.03)
URATE SERPL-MCNC: 6 MG/DL (ref 3.4–7)
URN SPEC COLLECT METH UR: NORMAL
UROBILINOGEN UR STRIP-ACNC: NEGATIVE EU/DL
WBC # BLD AUTO: 4.33 K/UL (ref 3.9–12.7)

## 2023-05-22 PROCEDURE — 85025 COMPLETE CBC W/AUTO DIFF WBC: CPT | Performed by: STUDENT IN AN ORGANIZED HEALTH CARE EDUCATION/TRAINING PROGRAM

## 2023-05-22 PROCEDURE — 82043 UR ALBUMIN QUANTITATIVE: CPT | Performed by: STUDENT IN AN ORGANIZED HEALTH CARE EDUCATION/TRAINING PROGRAM

## 2023-05-22 PROCEDURE — 83970 ASSAY OF PARATHORMONE: CPT | Performed by: STUDENT IN AN ORGANIZED HEALTH CARE EDUCATION/TRAINING PROGRAM

## 2023-05-22 PROCEDURE — 81000 URINALYSIS NONAUTO W/SCOPE: CPT | Performed by: STUDENT IN AN ORGANIZED HEALTH CARE EDUCATION/TRAINING PROGRAM

## 2023-05-22 PROCEDURE — 80069 RENAL FUNCTION PANEL: CPT | Performed by: STUDENT IN AN ORGANIZED HEALTH CARE EDUCATION/TRAINING PROGRAM

## 2023-05-22 PROCEDURE — 84550 ASSAY OF BLOOD/URIC ACID: CPT | Performed by: STUDENT IN AN ORGANIZED HEALTH CARE EDUCATION/TRAINING PROGRAM

## 2023-05-22 PROCEDURE — 83735 ASSAY OF MAGNESIUM: CPT | Performed by: STUDENT IN AN ORGANIZED HEALTH CARE EDUCATION/TRAINING PROGRAM

## 2023-05-22 PROCEDURE — 84156 ASSAY OF PROTEIN URINE: CPT | Performed by: STUDENT IN AN ORGANIZED HEALTH CARE EDUCATION/TRAINING PROGRAM

## 2023-05-25 ENCOUNTER — PATIENT MESSAGE (OUTPATIENT)
Dept: ADMINISTRATIVE | Facility: HOSPITAL | Age: 68
End: 2023-05-25
Payer: MEDICARE

## 2023-05-26 ENCOUNTER — PATIENT MESSAGE (OUTPATIENT)
Dept: FAMILY MEDICINE | Facility: CLINIC | Age: 68
End: 2023-05-26
Payer: MEDICARE

## 2023-05-31 ENCOUNTER — OFFICE VISIT (OUTPATIENT)
Dept: FAMILY MEDICINE | Facility: CLINIC | Age: 68
End: 2023-05-31
Payer: MEDICARE

## 2023-05-31 VITALS
HEIGHT: 72 IN | RESPIRATION RATE: 18 BRPM | BODY MASS INDEX: 30.52 KG/M2 | HEART RATE: 77 BPM | OXYGEN SATURATION: 97 % | SYSTOLIC BLOOD PRESSURE: 114 MMHG | WEIGHT: 225.31 LBS | DIASTOLIC BLOOD PRESSURE: 66 MMHG

## 2023-05-31 DIAGNOSIS — E11.9 TYPE 2 DIABETES MELLITUS WITHOUT COMPLICATION, WITHOUT LONG-TERM CURRENT USE OF INSULIN: ICD-10-CM

## 2023-05-31 DIAGNOSIS — Z23 NEED FOR SHINGLES VACCINE: ICD-10-CM

## 2023-05-31 DIAGNOSIS — E11.69 HYPERLIPIDEMIA ASSOCIATED WITH TYPE 2 DIABETES MELLITUS: ICD-10-CM

## 2023-05-31 DIAGNOSIS — I73.9 PVD (PERIPHERAL VASCULAR DISEASE) WITH CLAUDICATION: ICD-10-CM

## 2023-05-31 DIAGNOSIS — D69.6 THROMBOCYTOPENIA, UNSPECIFIED: ICD-10-CM

## 2023-05-31 DIAGNOSIS — E66.9 OBESITY, CLASS I, BMI 30-34.9: ICD-10-CM

## 2023-05-31 DIAGNOSIS — R26.9 ABNORMALITY OF GAIT AND MOBILITY: ICD-10-CM

## 2023-05-31 DIAGNOSIS — Z12.11 COLON CANCER SCREENING: ICD-10-CM

## 2023-05-31 DIAGNOSIS — Z00.00 ENCOUNTER FOR PREVENTIVE HEALTH EXAMINATION: Primary | ICD-10-CM

## 2023-05-31 DIAGNOSIS — Z95.5 S/P CORONARY ARTERY STENT PLACEMENT: ICD-10-CM

## 2023-05-31 DIAGNOSIS — I77.819 ECTATIC AORTA: ICD-10-CM

## 2023-05-31 DIAGNOSIS — I15.2 HYPERTENSION ASSOCIATED WITH DIABETES: ICD-10-CM

## 2023-05-31 DIAGNOSIS — E11.59 HYPERTENSION ASSOCIATED WITH DIABETES: ICD-10-CM

## 2023-05-31 DIAGNOSIS — E78.5 HYPERLIPIDEMIA ASSOCIATED WITH TYPE 2 DIABETES MELLITUS: ICD-10-CM

## 2023-05-31 DIAGNOSIS — I25.118 CORONARY ARTERY DISEASE OF NATIVE ARTERY OF NATIVE HEART WITH STABLE ANGINA PECTORIS: ICD-10-CM

## 2023-05-31 PROBLEM — E66.01 MORBID (SEVERE) OBESITY DUE TO EXCESS CALORIES: Status: RESOLVED | Noted: 2022-05-23 | Resolved: 2023-05-31

## 2023-05-31 PROCEDURE — G0439 PR MEDICARE ANNUAL WELLNESS SUBSEQUENT VISIT: ICD-10-PCS | Mod: ,,, | Performed by: FAMILY MEDICINE

## 2023-05-31 PROCEDURE — G0439 PPPS, SUBSEQ VISIT: HCPCS | Mod: ,,, | Performed by: FAMILY MEDICINE

## 2023-05-31 RX ORDER — ZOSTER VACCINE RECOMBINANT, ADJUVANTED 50 MCG/0.5
0.5 KIT INTRAMUSCULAR ONCE
Qty: 1 EACH | Refills: 0 | Status: SHIPPED | OUTPATIENT
Start: 2023-05-31 | End: 2023-05-31

## 2023-05-31 NOTE — PROGRESS NOTES
Lei Hsu presented for a  Medicare AWV and comprehensive Health Risk Assessment today. The following components were reviewed and updated:    Medical history  Family History  Social history  Allergies and Current Medications  Health Risk Assessment  Health Maintenance  Care Team         ** See Completed Assessments for Annual Wellness Visit within the encounter summary.**         The following assessments were completed:  Living Situation  CAGE  Depression Screening  Timed Get Up and Go  Whisper Test  Cognitive Function Screening  Nutrition Screening  ADL Screening  PAQ Screening          Vitals:    05/31/23 1111   BP: 114/66   BP Location: Right arm   Patient Position: Sitting   BP Method: Medium (Manual)   Pulse: 77   Resp: 18   SpO2: 97%   Weight: 102.2 kg (225 lb 5 oz)   Height: 6' (1.829 m)     Body mass index is 30.56 kg/m².  Physical Exam  Vitals reviewed.   Constitutional:       Appearance: Normal appearance.   HENT:      Head: Normocephalic and atraumatic.   Eyes:      Pupils: Pupils are equal, round, and reactive to light.   Pulmonary:      Effort: Pulmonary effort is normal. No respiratory distress.   Skin:     General: Skin is warm and dry.   Neurological:      General: No focal deficit present.      Mental Status: He is alert and oriented to person, place, and time.   Psychiatric:         Mood and Affect: Mood normal.         Behavior: Behavior normal.     The ASCVD Risk score (Enrique DK, et al., 2019) failed to calculate for the following reasons:    The valid total cholesterol range is 130 to 320 mg/dL        Diagnoses and health risks identified today and associated recommendations/orders:    1. Encounter for preventive health examination    2. Obesity, Class I, BMI 30-34.9  Body mass index is 30.56 kg/m².  Continue healthy diet and regular exercise as tolerated.  Continue medications as prescribed.  Follow up with PCP, Candy Linda MD     3. Hypertension associated with  diabetes  Stable  Continue medications as prescribed.  Follow up with PCP and cardiology, Dr Burton    4. Hyperlipidemia associated with type 2 diabetes mellitus  Stable  Continue medications as prescribed.  Follow up with PCP and cardio    5. Coronary artery disease of native artery of native heart with stable angina pectoris  Stable  Continue medications as prescribed.  Follow up with PCP and cardio    6. Ectatic aorta  Stable  Continue medications as prescribed.  Follow up with PCP and cardio    7. S/P coronary artery stent placement  Stable  Continue medications as prescribed.  Follow up with PCP and cardio    8. Type 2 diabetes mellitus without complication, without long-term current use of insulin  Stable  Continue medications as prescribed.  Follow up with PCP     9. PVD (peripheral vascular disease) with claudication  Stable  Continue medications as prescribed.  Follow up with PCP     10. Thrombocytopenia, unspecified  Stable  Continue medications as prescribed.  Follow up with PCP     11. Abnormality of gait and mobility  Stable without assistive device    12. Colon cancer screening  - Ambulatory referral/consult to Gastroenterology; Future    13. Need for shingles vaccine        - future; varicella-zoster [id number], PF, (SHINGRIX, PF,) 50 mcg/0.5 mL injection      Provided Lei with a 5-10 year written screening schedule and personal prevention plan. Recommendations were developed using the USPSTF age appropriate recommendations. Education, counseling, and referrals were provided as needed. After Visit Summary printed and given to patient which includes a list of additional screenings\tests needed.    Follow up if symptoms worsen or fail to improve, for 1 year for AWV, scheduled appt.    Verenice Hinojosa NP        Future Appointments       Date Provider Specialty Appt Notes    6/6/2023 Candy Linda MD Family Medicine 6 month ck up dm              Review for Opioid Screening: Risk factors reviewed  for any potential opioid use disorder  Review for Substance Use Disorders: Risk factors reviewed for any potential opioid use disorder     I offered to discuss advanced care planning, including how to pick a person who would make decisions for you if you were unable to make them for yourself, called a health care power of , and what kind of decisions you might make such as use of life sustaining treatments such as ventilators and tube feeding when faced with a life limiting illness recorded on a living will that they will need to know. (How you want to be cared for as you near the end of your natural life)     X  Patient has advanced directives written and agrees to provide copies to the institution.

## 2023-05-31 NOTE — PATIENT INSTRUCTIONS
Counseling and Referral of Other Preventative  (Italic type indicates deductible and co-insurance are waived)    Patient Name: Lei Hsu  Today's Date: 5/31/2023    Health Maintenance       Date Due Completion Date    Colorectal Cancer Screening Never done ---    Shingles Vaccine (1 of 2) Never done ---    LDCT Lung Screen 12/14/2019 12/14/2018    COVID-19 Vaccine (3 - Pfizer series) 05/21/2021 3/26/2021    Pneumococcal Vaccines (Age 65+) (2 - PCV) 07/08/2021 7/8/2020    PROSTATE-SPECIFIC ANTIGEN 07/20/2022 7/20/2021    Foot Exam 11/22/2022 11/22/2021    Eye Exam 04/25/2023 4/25/2022    Override on 12/9/2020: Done ([facility]- See care everywhere)    Hemoglobin A1c 06/08/2023 12/8/2022    High Dose Statin 12/07/2023 12/7/2022    Aspirin/Antiplatelet Therapy 12/07/2023 12/7/2022    Lipid Panel 12/08/2023 12/8/2022    Diabetes Urine Screening 05/22/2024 5/22/2023    TETANUS VACCINE 06/12/2027 6/12/2017        No orders of the defined types were placed in this encounter.      The following information is provided to all patients.  This information is to help you find resources for any of the problems found today that may be affecting your health:                Living healthy guide: www.[facility].[place].gov      Understanding Diabetes: www.diabetes.org      Eating healthy: www.cdc.gov/healthyweight      CDC home safety checklist: www.cdc.gov/steadi/patient.html      Agency on Aging: www.goea.[place].[facility]      Alcoholics anonymous (AA): www.aa.org      Physical Activity: www.tha.nih.gov/tt8cecn      Tobacco use: www.quitwithusla.org

## 2023-06-02 ENCOUNTER — PATIENT OUTREACH (OUTPATIENT)
Dept: ADMINISTRATIVE | Facility: HOSPITAL | Age: 68
End: 2023-06-02
Payer: MEDICARE

## 2023-06-02 NOTE — LETTER
AUTHORIZATION FOR RELEASE OF   CONFIDENTIAL INFORMATION    Dear RYDER    We are seeing Lei Hsu, date of birth 1955, in the clinic at [facility] MEDICINE 1ST FLOOR. Candy Linda MD is the patient's PCP. Lei Hsu has an outstanding lab/procedure at the time we reviewed his chart. In order to help keep his health information updated, he has authorized us to request the following medical record(s):        (  )  MAMMOGRAM                                      ( X )  COLONOSCOPY      (  )  PAP SMEAR                                          (  )  OUTSIDE LAB RESULTS     (  )  DEXA SCAN                                          (  )  EYE EXAM            (  )  FOOT EXAM                                          (  )  ENTIRE RECORD     (  )  OUTSIDE IMMUNIZATIONS                 (  )  _______________         Please fax records to [facility], Elizabeth D Cole, MD, [phone]    Thank you in advance,       Yanira BARRERA  Care Coordinator  [facility]  [address] [place] [address]  Phone [phone]  Fax [phone]           Patient Name: Lei Hsu  : 1955  Patient Phone #: [phone]

## 2023-06-02 NOTE — LETTER
AUTHORIZATION FOR RELEASE OF   CONFIDENTIAL INFORMATION    DR SOLER    We are seeing Lei Hsu, date of birth 1955, in the clinic at [facility] MEDICINE 1ST FLOOR. Candy Linda MD is the patient's PCP. Lei Hsu has an outstanding lab/procedure at the time we reviewed his chart. In order to help keep his health information updated, he has authorized us to request the following medical record(s):        (  )  MAMMOGRAM                                      (  )  COLONOSCOPY      (  )  PAP SMEAR                                          (  )  OUTSIDE LAB RESULTS     (  )  DEXA SCAN                                          ( X )  EYE EXAM            (  )  FOOT EXAM                                          (  )  ENTIRE RECORD     (  )  OUTSIDE IMMUNIZATIONS                 (  )  _______________         Please fax records to [facility], Elizabeth D Cole, MD, [phone]    Thank you in advance,       Yanira ABRRERA  Care Coordinator  [facility]  [address] [place] [address]  Phone [phone]  Fax [phone]           Patient Name: Lei Hsu  : 1955  Patient Phone #: [phone]

## 2023-06-02 NOTE — PROGRESS NOTES
Population Health Chart Review & Patient Outreach Details:     Reason for Outreach Encounter:     []  Non-Compliant Report   []  Payor Report (Hum[employer], PHN, BCBS, MSSP, MCIP, UHC, etc.)   []  Pre-Visit Chart Review     Updates Requested / Reviewed:     [x]  Care Everywhere    [x]     []  External Sources ([facility], [facility], DIS, etc.)   [x]  Care Team Updated    Patient Outreach Method:    []  Telephone Outreach Completed   [] Successful   [] Left Voicemail   [] Unable to Contact (wrong number, no voicemail)  []  [employer] Mobile Portal Outreach Sent  []  Letter Outreach Mailed  []  Fax Sent for External Records  [x]  External Records Upload    Health Maintenance Topics Addressed and Outreach Outcomes / Actions Taken:        []      Breast Cancer Screening []  Mammo Scheduled      []  External Records Requested     []  Added Reminder to Complete to Upcoming Primary Care Appt Notes     []  Patient Declined     []  Patient Will Call Back to Schedule     []  Patient Will Schedule with External Provider / Order Routed if Applicable             []       Cervical Cancer Screening []  Pap Scheduled      []  External Records Requested     []  Added Reminder to Complete to Upcoming Primary Care Appt Notes     []  Patient Declined     []  Patient Will Call Back to Schedule     []  Patient Will Schedule with External Provider               [x]          Colorectal Cancer Screening []  Colonoscopy Case Request or Referral Placed     [x]  External Records Requested     []  Added Reminder to Complete to Upcoming Primary Care Appt Notes     []  Patient Declined     []  Patient Will Call Back to Schedule     []  Patient Will Schedule with External Provider     []  Fit Kit Mailed (add the SmartPhrase under additional notes)     []  Reminded Patient to Complete Home Test             [x]      Diabetic Eye Exam []  Eye Camera Scheduled or Optometry Referral Placed     []  External Records Requested     []  Added Reminder to Complete  to Upcoming Primary Care Appt Notes     []  Patient Declined     []  Patient Will Call Back to Schedule     []  Patient Will Schedule with External Provider             []      Blood Pressure Control []  Primary Care Follow Up Visit Scheduled     []  Remote Blood Pressure Reading Captured     []  Added Reminder to Complete to Upcoming Primary Care Appt Notes     []  Patient Declined     []  Patient Will Call Back / Patient Will Send Portal Message with Reading     []  Patient Will Call Back to Schedule Provider Visit             []       HbA1c & Other Labs []  Lab Appt Scheduled for Due Labs     []  Primary Care Follow Up Visit Scheduled      []  Reminded Patient to Complete Home Test     []  Added Reminder to Complete to Upcoming Primary Care Appt Notes     []  Patient Declined     []  Patient Will Call Back to Schedule     []  Patient Will Schedule with External Provider / Order Routed if Applicable           []    Schedule Primary Care Appt []  Primary Care Appt Scheduled     []  Patient Declined     []  Patient Will Call Back to Schedule     []  Pt Established with External Provider & Updated Care Team             []      Medication Adherence []  Primary Care Appointment Scheduled     []  Added Reminder to Upcoming Primary Care Appt Notes     []  Patient Reminded to  Prescription     []  Patient Declined, Provider Notified if Needed     []  Sent Provider Message to Review and/or Add Exclusion to Problem List             []      Osteoporosis Screening []  DXA Appointment Scheduled     []  External Records Requested     []  Added Reminder to Complete to Upcoming Primary Care Appt Notes     []  Patient Declined     []  Patient Will Call Back to Schedule     []  Patient Will Schedule with External Provider / Order Routed if Applicable     Additional Care Coordinator Notes:  CC'D CHART REQUEST FROM PROVIDER       Further Action Needed If Patient Returns Outreach:

## 2023-06-06 ENCOUNTER — LAB VISIT (OUTPATIENT)
Dept: LAB | Facility: CLINIC | Age: 68
End: 2023-06-06
Payer: MEDICARE

## 2023-06-06 ENCOUNTER — OFFICE VISIT (OUTPATIENT)
Dept: FAMILY MEDICINE | Facility: CLINIC | Age: 68
End: 2023-06-06
Payer: MEDICARE

## 2023-06-06 VITALS
TEMPERATURE: 98 F | DIASTOLIC BLOOD PRESSURE: 80 MMHG | OXYGEN SATURATION: 97 % | HEIGHT: 72 IN | BODY MASS INDEX: 30.64 KG/M2 | WEIGHT: 226.19 LBS | HEART RATE: 89 BPM | SYSTOLIC BLOOD PRESSURE: 122 MMHG

## 2023-06-06 DIAGNOSIS — E11.9 TYPE 2 DIABETES MELLITUS WITHOUT COMPLICATION, WITHOUT LONG-TERM CURRENT USE OF INSULIN: ICD-10-CM

## 2023-06-06 DIAGNOSIS — E11.59 HYPERTENSION ASSOCIATED WITH DIABETES: ICD-10-CM

## 2023-06-06 DIAGNOSIS — E78.5 HYPERLIPIDEMIA ASSOCIATED WITH TYPE 2 DIABETES MELLITUS: ICD-10-CM

## 2023-06-06 DIAGNOSIS — E11.59 HYPERTENSION ASSOCIATED WITH DIABETES: Primary | ICD-10-CM

## 2023-06-06 DIAGNOSIS — I15.2 HYPERTENSION ASSOCIATED WITH DIABETES: Primary | ICD-10-CM

## 2023-06-06 DIAGNOSIS — E11.69 HYPERLIPIDEMIA ASSOCIATED WITH TYPE 2 DIABETES MELLITUS: ICD-10-CM

## 2023-06-06 DIAGNOSIS — I15.2 HYPERTENSION ASSOCIATED WITH DIABETES: ICD-10-CM

## 2023-06-06 LAB
ALBUMIN SERPL BCP-MCNC: 4 G/DL (ref 3.5–5.2)
ALP SERPL-CCNC: 99 U/L (ref 55–135)
ALT SERPL W/O P-5'-P-CCNC: 12 U/L (ref 10–44)
ANION GAP SERPL CALC-SCNC: 10 MMOL/L (ref 8–16)
AST SERPL-CCNC: 17 U/L (ref 10–40)
BASOPHILS # BLD AUTO: 0.03 K/UL (ref 0–0.2)
BASOPHILS NFR BLD: 0.7 % (ref 0–1.9)
BILIRUB SERPL-MCNC: 0.2 MG/DL (ref 0.1–1)
BUN SERPL-MCNC: 19 MG/DL (ref 8–23)
CALCIUM SERPL-MCNC: 8.7 MG/DL (ref 8.7–10.5)
CHLORIDE SERPL-SCNC: 104 MMOL/L (ref 95–110)
CO2 SERPL-SCNC: 24 MMOL/L (ref 23–29)
CREAT SERPL-MCNC: 1.3 MG/DL (ref 0.5–1.4)
DIFFERENTIAL METHOD: ABNORMAL
EOSINOPHIL # BLD AUTO: 0.1 K/UL (ref 0–0.5)
EOSINOPHIL NFR BLD: 1.1 % (ref 0–8)
ERYTHROCYTE [DISTWIDTH] IN BLOOD BY AUTOMATED COUNT: 14.5 % (ref 11.5–14.5)
EST. GFR  (NO RACE VARIABLE): 60 ML/MIN/1.73 M^2
GLUCOSE SERPL-MCNC: 169 MG/DL (ref 70–110)
HCT VFR BLD AUTO: 34.3 % (ref 40–54)
HGB BLD-MCNC: 10.2 G/DL (ref 14–18)
IMM GRANULOCYTES # BLD AUTO: 0.01 K/UL (ref 0–0.04)
IMM GRANULOCYTES NFR BLD AUTO: 0.2 % (ref 0–0.5)
LYMPHOCYTES # BLD AUTO: 1.3 K/UL (ref 1–4.8)
LYMPHOCYTES NFR BLD: 28.7 % (ref 18–48)
MCH RBC QN AUTO: 25.1 PG (ref 27–31)
MCHC RBC AUTO-ENTMCNC: 29.7 G/DL (ref 32–36)
MCV RBC AUTO: 84 FL (ref 82–98)
MONOCYTES # BLD AUTO: 0.3 K/UL (ref 0.3–1)
MONOCYTES NFR BLD: 7.1 % (ref 4–15)
NEUTROPHILS # BLD AUTO: 2.8 K/UL (ref 1.8–7.7)
NEUTROPHILS NFR BLD: 62.2 % (ref 38–73)
NRBC BLD-RTO: 0 /100 WBC
PLATELET # BLD AUTO: 153 K/UL (ref 150–450)
PMV BLD AUTO: 10.8 FL (ref 9.2–12.9)
POTASSIUM SERPL-SCNC: 4.4 MMOL/L (ref 3.5–5.1)
PROT SERPL-MCNC: 6.9 G/DL (ref 6–8.4)
RBC # BLD AUTO: 4.07 M/UL (ref 4.6–6.2)
SODIUM SERPL-SCNC: 138 MMOL/L (ref 136–145)
T4 FREE SERPL-MCNC: 0.9 NG/DL (ref 0.71–1.51)
TSH SERPL DL<=0.005 MIU/L-ACNC: 6.28 UIU/ML (ref 0.4–4)
WBC # BLD AUTO: 4.5 K/UL (ref 3.9–12.7)

## 2023-06-06 PROCEDURE — 83036 HEMOGLOBIN GLYCOSYLATED A1C: CPT | Performed by: FAMILY MEDICINE

## 2023-06-06 PROCEDURE — 84439 ASSAY OF FREE THYROXINE: CPT | Performed by: FAMILY MEDICINE

## 2023-06-06 PROCEDURE — 84443 ASSAY THYROID STIM HORMONE: CPT | Performed by: FAMILY MEDICINE

## 2023-06-06 PROCEDURE — 99214 PR OFFICE/OUTPT VISIT, EST, LEVL IV, 30-39 MIN: ICD-10-PCS | Mod: ,,, | Performed by: FAMILY MEDICINE

## 2023-06-06 PROCEDURE — 99214 OFFICE O/P EST MOD 30 MIN: CPT | Mod: ,,, | Performed by: FAMILY MEDICINE

## 2023-06-06 PROCEDURE — 85025 COMPLETE CBC W/AUTO DIFF WBC: CPT | Performed by: FAMILY MEDICINE

## 2023-06-06 PROCEDURE — 80053 COMPREHEN METABOLIC PANEL: CPT | Performed by: FAMILY MEDICINE

## 2023-06-06 NOTE — PROGRESS NOTES
Subjective:       Patient ID: Lei Hsu is a 68 y.o. male.    Chief Complaint: Follow-up (Pt here for 6 month ck up DM/Due labs/C/o Remeron didn't help to sleep)     is a 68-year-old male who is here for his routine six-month diabetic follow-up.  He has a history of long-term use of opiates, peripheral vascular disease secondary to diabetes, coronary artery disease, type 2 diabetes, hyperlipidemia, hypertension, and morbid obesity.  Labs done on 12/08/2022 revealed a PTH of 122, TSH 5.6, T4 0.83.  Other labs done on 05/22/2023 reveals CBC with a hemoglobin of 9.9 hematocrit of 33.1, CMP significant for glucose of 141 and a phosphorus of 2.6 otherwise within normal limits.      Mr. Hsu has the following specialists:  1. Dr. Mart-ophthalmologist (had an appointment yesterday)  2. Dr. Ceballos-nephrologist  3. Dr. Godinez-GI (colonoscopy scheduled for July 2023)  4. - urology   5 Dr. Burton-cardiology    He is also due for his diabetic foot exam and low-dose CT of the lung, however h his wife is ill, so he is spending a lot of time taking her to numerous doctors appointments and does not have time to do these.  He states he will revisit the idea in his next six-month appointment    Review of Systems   Constitutional:  Positive for fatigue.   HENT:  Positive for postnasal drip and rhinorrhea.    Eyes:         Recent cataract surgery , now with presbyopia   Respiratory:  Positive for cough. Negative for shortness of breath.    Cardiovascular:  Negative for chest pain and palpitations.   Gastrointestinal:         Colonoscopy scheduled with Dr Godinez in July 2023   Genitourinary:  Negative for dysuria.   Musculoskeletal:  Positive for arthralgias, back pain and myalgias.   Allergic/Immunologic: Positive for environmental allergies.   Neurological:  Negative for weakness.   Psychiatric/Behavioral:  Negative for agitation. The patient is not nervous/anxious.      Patient Active Problem  List   Diagnosis    PVD (peripheral vascular disease) with claudication    Hypertension associated with diabetes    Hyperlipidemia associated with type 2 diabetes mellitus    Tobacco use    Obesity with body mass index 30 or greater    SOB (shortness of breath)    Encounter for pre-operative cardiovascular clearance    Osteoarthritis of right knee    Status post total right knee replacement    Coronary artery disease involving native coronary artery of native heart without angina pectoris    Type 2 diabetes mellitus without complication, without long-term current use of insulin    Chronic pain    S/P coronary artery stent placement    Peripheral vascular disorder due to diabetes mellitus    Osteoarthritis    Diabetic nephropathy associated with type 2 diabetes mellitus    Jock itch    Long term (current) use of opiate analgesic    Stable angina pectoris    Dyspepsia    Chronic right hip pain    Positive cardiac stress test    GERD (gastroesophageal reflux disease)    Thrombocytopenia, unspecified    Primary insomnia    Nocturia    Ectatic aorta       Objective:      Physical Exam  Constitutional:       Appearance: Normal appearance. He is obese.   HENT:      Head: Normocephalic and atraumatic.      Nose: Nose normal.      Mouth/Throat:      Mouth: Mucous membranes are moist.   Eyes:      Pupils: Pupils are equal, round, and reactive to light.   Cardiovascular:      Rate and Rhythm: Normal rate and regular rhythm.      Pulses: Normal pulses.      Heart sounds: Normal heart sounds.   Pulmonary:      Effort: Pulmonary effort is normal.      Breath sounds: Normal breath sounds.   Skin:     General: Skin is warm and dry.   Neurological:      General: No focal deficit present.      Mental Status: He is alert and oriented to person, place, and time. Mental status is at baseline.   Psychiatric:         Mood and Affect: Mood normal.         Behavior: Behavior normal.         Thought Content: Thought content normal.       Lab  Results   Component Value Date    WBC 4.33 05/22/2023    HGB 9.9 (L) 05/22/2023    HCT 33.1 (L) 05/22/2023     (L) 05/22/2023    CHOL 125 12/08/2022    TRIG 115 12/08/2022    HDL 39 (L) 12/08/2022    ALT 19 12/08/2022    AST 18 12/08/2022     05/22/2023    K 4.5 05/22/2023     05/22/2023    CREATININE 1.3 05/22/2023    BUN 20 05/22/2023    CO2 24 05/22/2023    TSH 5.636 (H) 12/08/2022    PSA 0.86 07/20/2021    HGBA1C 5.6 12/08/2022     The ASCVD Risk score (Enrique HERRERA, et al., 2019) failed to calculate for the following reasons:    The valid total cholesterol range is 130 to 320 mg/dL  Visit Vitals  /80 (BP Location: Right arm, Patient Position: Sitting, BP Method: Medium (Manual))   Pulse 89   Temp 98.2 °F (36.8 °C)   Ht 6' (1.829 m)   Wt 102.6 kg (226 lb 3.1 oz)   SpO2 97%   BMI 30.68 kg/m²      Assessment:       1. Hypertension associated with diabetes    2. Hyperlipidemia associated with type 2 diabetes mellitus    3. Type 2 diabetes mellitus without complication, without long-term current use of insulin        Plan:       1. Hypertension associated with diabetes  -     CBC Auto Differential; Future; Expected date: 06/06/2023  -     Comprehensive Metabolic Panel; Future; Expected date: 06/06/2023  -     Microalbumin/Creatinine Ratio, Urine; Future; Expected date: 06/06/2023    2. Hyperlipidemia associated with type 2 diabetes mellitus  -     Cancel: Lipid Panel; Future; Expected date: 06/06/2023    3. Type 2 diabetes mellitus without complication, without long-term current use of insulin  -     Hemoglobin A1C; Future; Expected date: 06/06/2023  -     TSH; Future; Expected date: 06/06/2023  -     Microalbumin/Creatinine Ratio, Urine; Future; Expected date: 06/06/2023       Follow up in about 6 months (around 12/6/2023), or if symptoms worsen or fail to improve.

## 2023-06-07 ENCOUNTER — PATIENT OUTREACH (OUTPATIENT)
Dept: ADMINISTRATIVE | Facility: HOSPITAL | Age: 68
End: 2023-06-07
Payer: MEDICARE

## 2023-06-07 LAB
ESTIMATED AVG GLUCOSE: 114 MG/DL (ref 68–131)
HBA1C MFR BLD: 5.6 % (ref 4–5.6)

## 2023-06-07 NOTE — LETTER
AUTHORIZATION FOR RELEASE OF   CONFIDENTIAL INFORMATION    Dr Godinez    We are seeing Lei Hsu, date of birth 1955, in the clinic at [facility] MEDICINE 1ST FLOOR. Candy Linda MD is the patient's PCP. Lei Hsu has an outstanding lab/procedure at the time we reviewed his chart. In order to help keep his health information updated, he has authorized us to request the following medical record(s):        (  )  MAMMOGRAM                                      ( X )  COLONOSCOPY      (  )  PAP SMEAR                                          (  )  OUTSIDE LAB RESULTS     (  )  DEXA SCAN                                          (  )  EYE EXAM            (  )  FOOT EXAM                                          (  )  ENTIRE RECORD     (  )  OUTSIDE IMMUNIZATIONS                 (  )  _______________         Please fax records to [facility], Elizabeth D Cole, MD, [phone]     Thank you in advance,       Yanira BARRERA  Care Coordinator  [facility]  [address] [place] [address]  Phone [phone]  Fax [phone]         Patient Name: Lei Hsu  : 1955  Patient Phone #: [phone]

## 2023-06-20 DIAGNOSIS — E03.9 HYPOTHYROIDISM, UNSPECIFIED TYPE: Primary | ICD-10-CM

## 2023-06-20 RX ORDER — LEVOTHYROXINE SODIUM 25 UG/1
25 TABLET ORAL
Qty: 90 TABLET | Refills: 3 | Status: SHIPPED | OUTPATIENT
Start: 2023-06-20

## 2023-06-21 ENCOUNTER — TELEPHONE (OUTPATIENT)
Dept: FAMILY MEDICINE | Facility: CLINIC | Age: 68
End: 2023-06-21
Payer: MEDICARE

## 2023-06-21 NOTE — TELEPHONE ENCOUNTER
[facility] Synthroid was sent to the pharmacy yesterday evening.    ----- Message from Candy Linda MD sent at 6/20/2023  5:32 PM CDT -----  Need to start you on Synthroid, medication we called to your pharmacy

## 2023-07-03 ENCOUNTER — LAB VISIT (OUTPATIENT)
Dept: LAB | Facility: CLINIC | Age: 68
End: 2023-07-03
Payer: MEDICARE

## 2023-07-03 DIAGNOSIS — N18.2 CHRONIC KIDNEY DISEASE, STAGE II (MILD): Primary | ICD-10-CM

## 2023-07-03 DIAGNOSIS — N20.0 NEPHROLITH: ICD-10-CM

## 2023-07-03 DIAGNOSIS — D69.6 THROMBOCYTOPENIA, UNSPECIFIED: ICD-10-CM

## 2023-07-03 DIAGNOSIS — E78.2 MIXED HYPERLIPIDEMIA: ICD-10-CM

## 2023-07-03 DIAGNOSIS — R10.10 UPPER ABDOMINAL PAIN: ICD-10-CM

## 2023-07-03 LAB
ALBUMIN SERPL BCP-MCNC: 3.9 G/DL (ref 3.5–5.2)
ALBUMIN/CREAT UR: 4 UG/MG (ref 0–30)
ANION GAP SERPL CALC-SCNC: 10 MMOL/L (ref 8–16)
BASOPHILS # BLD AUTO: 0.02 K/UL (ref 0–0.2)
BASOPHILS NFR BLD: 0.5 % (ref 0–1.9)
BUN SERPL-MCNC: 21 MG/DL (ref 8–23)
CALCIUM SERPL-MCNC: 8.9 MG/DL (ref 8.7–10.5)
CHLORIDE SERPL-SCNC: 107 MMOL/L (ref 95–110)
CO2 SERPL-SCNC: 25 MMOL/L (ref 23–29)
COMPLEXED PSA SERPL-MCNC: 0.65 NG/ML (ref 0–4)
CREAT SERPL-MCNC: 1.2 MG/DL (ref 0.5–1.4)
CREAT UR-MCNC: 126 MG/DL (ref 23–375)
DIFFERENTIAL METHOD: ABNORMAL
EOSINOPHIL # BLD AUTO: 0 K/UL (ref 0–0.5)
EOSINOPHIL NFR BLD: 0.9 % (ref 0–8)
ERYTHROCYTE [DISTWIDTH] IN BLOOD BY AUTOMATED COUNT: 14 % (ref 11.5–14.5)
EST. GFR  (NO RACE VARIABLE): >60 ML/MIN/1.73 M^2
GLUCOSE SERPL-MCNC: 112 MG/DL (ref 70–110)
HCT VFR BLD AUTO: 34 % (ref 40–54)
HGB BLD-MCNC: 10.2 G/DL (ref 14–18)
IMM GRANULOCYTES # BLD AUTO: 0.02 K/UL (ref 0–0.04)
IMM GRANULOCYTES NFR BLD AUTO: 0.5 % (ref 0–0.5)
LYMPHOCYTES # BLD AUTO: 1.3 K/UL (ref 1–4.8)
LYMPHOCYTES NFR BLD: 28.2 % (ref 18–48)
MCH RBC QN AUTO: 24.8 PG (ref 27–31)
MCHC RBC AUTO-ENTMCNC: 30 G/DL (ref 32–36)
MCV RBC AUTO: 83 FL (ref 82–98)
MICROALBUMIN UR DL<=1MG/L-MCNC: 5 UG/ML
MONOCYTES # BLD AUTO: 0.4 K/UL (ref 0.3–1)
MONOCYTES NFR BLD: 7.9 % (ref 4–15)
NEUTROPHILS # BLD AUTO: 2.8 K/UL (ref 1.8–7.7)
NEUTROPHILS NFR BLD: 62 % (ref 38–73)
NRBC BLD-RTO: 0 /100 WBC
PHOSPHATE SERPL-MCNC: 3.2 MG/DL (ref 2.7–4.5)
PLATELET # BLD AUTO: 142 K/UL (ref 150–450)
PMV BLD AUTO: 10.7 FL (ref 9.2–12.9)
POTASSIUM SERPL-SCNC: 4.8 MMOL/L (ref 3.5–5.1)
RBC # BLD AUTO: 4.11 M/UL (ref 4.6–6.2)
SODIUM SERPL-SCNC: 142 MMOL/L (ref 136–145)
WBC # BLD AUTO: 4.43 K/UL (ref 3.9–12.7)

## 2023-07-03 PROCEDURE — 82306 VITAMIN D 25 HYDROXY: CPT | Performed by: STUDENT IN AN ORGANIZED HEALTH CARE EDUCATION/TRAINING PROGRAM

## 2023-07-03 PROCEDURE — 85025 COMPLETE CBC W/AUTO DIFF WBC: CPT | Performed by: STUDENT IN AN ORGANIZED HEALTH CARE EDUCATION/TRAINING PROGRAM

## 2023-07-03 PROCEDURE — 84153 ASSAY OF PSA TOTAL: CPT | Performed by: STUDENT IN AN ORGANIZED HEALTH CARE EDUCATION/TRAINING PROGRAM

## 2023-07-03 PROCEDURE — 82570 ASSAY OF URINE CREATININE: CPT | Performed by: STUDENT IN AN ORGANIZED HEALTH CARE EDUCATION/TRAINING PROGRAM

## 2023-07-03 PROCEDURE — 80069 RENAL FUNCTION PANEL: CPT | Performed by: STUDENT IN AN ORGANIZED HEALTH CARE EDUCATION/TRAINING PROGRAM

## 2023-07-05 LAB — 25(OH)D3+25(OH)D2 SERPL-MCNC: 18 NG/ML (ref 30–96)

## 2023-07-28 ENCOUNTER — PATIENT OUTREACH (OUTPATIENT)
Dept: ADMINISTRATIVE | Facility: HOSPITAL | Age: 68
End: 2023-07-28
Payer: MEDICARE

## 2023-07-28 NOTE — LETTER
AUTHORIZATION FOR RELEASE OF   CONFIDENTIAL INFORMATION    Dear Dr. Godinez/ Carisa,    We are seeing Lei Hsu, date of birth 1955, in the clinic at [facility] MEDICINE 1ST FLOOR. Candy Linda MD is the patient's PCP. Lei Hsu has an outstanding lab/procedure at the time we reviewed his chart. In order to help keep his health information updated, he has authorized us to request the following medical record(s):                                       ( X )  COLONOSCOPY -  or Most Recent             Please fax records to [facility], Elizabeth D Cole, MD, [phone]     If you have any questions, please contact [facility] at [phone].           Patient Name: Lei Hsu  : 1955  Patient Phone #: [phone]

## 2023-07-31 ENCOUNTER — PATIENT OUTREACH (OUTPATIENT)
Dept: ADMINISTRATIVE | Facility: HOSPITAL | Age: 68
End: 2023-07-31
Payer: MEDICARE

## 2023-10-03 ENCOUNTER — TELEPHONE (OUTPATIENT)
Dept: FAMILY MEDICINE | Facility: CLINIC | Age: 68
End: 2023-10-03
Payer: MEDICARE

## 2023-10-03 NOTE — TELEPHONE ENCOUNTER
Spoke with Jewell, will try to email me the order example mali Linda to see regarding ordering diabetic monitor, strips and lancets

## 2023-10-12 ENCOUNTER — PATIENT MESSAGE (OUTPATIENT)
Dept: ADMINISTRATIVE | Facility: HOSPITAL | Age: 68
End: 2023-10-12
Payer: MEDICARE

## 2023-12-13 DIAGNOSIS — M16.11 PRIMARY OSTEOARTHRITIS OF RIGHT HIP: ICD-10-CM

## 2023-12-13 DIAGNOSIS — N18.31 TYPE 2 DIABETES MELLITUS WITH STAGE 3A CHRONIC KIDNEY DISEASE, WITHOUT LONG-TERM CURRENT USE OF INSULIN: ICD-10-CM

## 2023-12-13 DIAGNOSIS — E78.2 MIXED HYPERLIPIDEMIA: ICD-10-CM

## 2023-12-13 DIAGNOSIS — G89.4 CHRONIC PAIN SYNDROME: ICD-10-CM

## 2023-12-13 DIAGNOSIS — E11.22 TYPE 2 DIABETES MELLITUS WITH STAGE 3A CHRONIC KIDNEY DISEASE, WITHOUT LONG-TERM CURRENT USE OF INSULIN: ICD-10-CM

## 2023-12-13 RX ORDER — METFORMIN HYDROCHLORIDE 500 MG/1
TABLET, EXTENDED RELEASE ORAL
Qty: 360 TABLET | Refills: 0 | Status: SHIPPED | OUTPATIENT
Start: 2023-12-13

## 2023-12-13 RX ORDER — ROSUVASTATIN CALCIUM 5 MG/1
5 TABLET, COATED ORAL DAILY
Qty: 90 TABLET | Refills: 0 | Status: SHIPPED | OUTPATIENT
Start: 2023-12-13

## 2023-12-13 RX ORDER — CELECOXIB 200 MG/1
200 CAPSULE ORAL
Qty: 90 CAPSULE | Refills: 0 | Status: SHIPPED | OUTPATIENT
Start: 2023-12-13 | End: 2024-03-18

## 2023-12-20 DIAGNOSIS — E11.9 TYPE 2 DIABETES MELLITUS WITHOUT COMPLICATION: ICD-10-CM

## 2023-12-21 RX ORDER — ISOSORBIDE MONONITRATE 30 MG/1
TABLET, EXTENDED RELEASE ORAL
Qty: 30 TABLET | Refills: 11 | OUTPATIENT
Start: 2023-12-21

## 2024-01-02 DIAGNOSIS — G89.4 CHRONIC PAIN SYNDROME: ICD-10-CM

## 2024-01-02 DIAGNOSIS — M43.26 FUSION OF SPINE, LUMBAR REGION: Primary | ICD-10-CM

## 2024-01-11 ENCOUNTER — HOSPITAL ENCOUNTER (OUTPATIENT)
Dept: RADIOLOGY | Facility: HOSPITAL | Age: 69
Discharge: HOME | End: 2024-01-11
Attending: PAIN MEDICINE
Payer: MEDICARE

## 2024-01-11 DIAGNOSIS — G89.4 CHRONIC PAIN SYNDROME: ICD-10-CM

## 2024-01-11 DIAGNOSIS — M43.26 FUSION OF SPINE, LUMBAR REGION: ICD-10-CM

## 2024-01-11 PROCEDURE — 72114 X-RAY EXAM L-S SPINE BENDING: CPT | Mod: TC,PO

## 2024-01-23 ENCOUNTER — HOSPITAL ENCOUNTER (OUTPATIENT)
Dept: RADIOLOGY | Facility: HOSPITAL | Age: 69
Discharge: HOME | End: 2024-01-23
Attending: PAIN MEDICINE
Payer: MEDICARE

## 2024-01-23 PROCEDURE — 72148 MRI LUMBAR SPINE W/O DYE: CPT | Mod: TC,PO

## 2024-02-01 RX ORDER — OMEPRAZOLE 40 MG/1
CAPSULE, DELAYED RELEASE ORAL
Qty: 30 CAPSULE | Refills: 0 | Status: SHIPPED | OUTPATIENT
Start: 2024-02-01 | End: 2024-04-01

## 2024-02-06 ENCOUNTER — LAB VISIT (OUTPATIENT)
Dept: LAB | Facility: HOSPITAL | Age: 69
End: 2024-02-06
Attending: INTERNAL MEDICINE
Payer: MEDICARE

## 2024-02-06 DIAGNOSIS — E11.9 TYPE II DIABETES MELLITUS: ICD-10-CM

## 2024-02-06 DIAGNOSIS — I10 HYPERTENSION, ESSENTIAL: Primary | ICD-10-CM

## 2024-02-06 DIAGNOSIS — N18.2 CHRONIC KIDNEY DISEASE, STAGE II (MILD): ICD-10-CM

## 2024-02-06 LAB
25(OH)D3+25(OH)D2 SERPL-MCNC: 39 NG/ML (ref 30–96)
ALBUMIN SERPL BCP-MCNC: 4.2 G/DL (ref 3.5–5.2)
ALBUMIN/CREAT UR: 7.5 UG/MG (ref 0–30)
ANION GAP SERPL CALC-SCNC: 10 MMOL/L (ref 8–16)
BASOPHILS # BLD AUTO: 0.03 K/UL (ref 0–0.2)
BASOPHILS NFR BLD: 0.7 % (ref 0–1.9)
BUN SERPL-MCNC: 26 MG/DL (ref 8–23)
CALCIUM SERPL-MCNC: 9.4 MG/DL (ref 8.7–10.5)
CHLORIDE SERPL-SCNC: 106 MMOL/L (ref 95–110)
CO2 SERPL-SCNC: 22 MMOL/L (ref 23–29)
CREAT SERPL-MCNC: 1.3 MG/DL (ref 0.5–1.4)
CREAT UR-MCNC: 133 MG/DL (ref 23–375)
CREAT UR-MCNC: 133 MG/DL (ref 23–375)
DIFFERENTIAL METHOD BLD: ABNORMAL
EOSINOPHIL # BLD AUTO: 0 K/UL (ref 0–0.5)
EOSINOPHIL NFR BLD: 0.9 % (ref 0–8)
ERYTHROCYTE [DISTWIDTH] IN BLOOD BY AUTOMATED COUNT: 14.9 % (ref 11.5–14.5)
EST. GFR  (NO RACE VARIABLE): 59.8 ML/MIN/1.73 M^2
GLUCOSE SERPL-MCNC: 106 MG/DL (ref 70–110)
HCT VFR BLD AUTO: 36.2 % (ref 40–54)
HGB BLD-MCNC: 11.2 G/DL (ref 14–18)
IMM GRANULOCYTES # BLD AUTO: 0 K/UL (ref 0–0.04)
IMM GRANULOCYTES NFR BLD AUTO: 0 % (ref 0–0.5)
LYMPHOCYTES # BLD AUTO: 1.3 K/UL (ref 1–4.8)
LYMPHOCYTES NFR BLD: 28.3 % (ref 18–48)
MCH RBC QN AUTO: 25.7 PG (ref 27–31)
MCHC RBC AUTO-ENTMCNC: 30.9 G/DL (ref 32–36)
MCV RBC AUTO: 83 FL (ref 82–98)
MICROALBUMIN UR DL<=1MG/L-MCNC: 10 UG/ML
MONOCYTES # BLD AUTO: 0.4 K/UL (ref 0.3–1)
MONOCYTES NFR BLD: 8.3 % (ref 4–15)
NEUTROPHILS # BLD AUTO: 2.8 K/UL (ref 1.8–7.7)
NEUTROPHILS NFR BLD: 61.8 % (ref 38–73)
NRBC BLD-RTO: 0 /100 WBC
PHOSPHATE SERPL-MCNC: 2.9 MG/DL (ref 2.7–4.5)
PLATELET # BLD AUTO: 127 K/UL (ref 150–450)
PMV BLD AUTO: 11.2 FL (ref 9.2–12.9)
POTASSIUM SERPL-SCNC: 4.6 MMOL/L (ref 3.5–5.1)
PROT UR-MCNC: 8 MG/DL (ref 0–15)
PROT/CREAT UR: 0.06 MG/G{CREAT} (ref 0–0.2)
PTH-INTACT SERPL-MCNC: 112.7 PG/ML (ref 9–77)
RBC # BLD AUTO: 4.35 M/UL (ref 4.6–6.2)
SODIUM SERPL-SCNC: 138 MMOL/L (ref 136–145)
URATE SERPL-MCNC: 7.3 MG/DL (ref 3.4–7)
WBC # BLD AUTO: 4.46 K/UL (ref 3.9–12.7)

## 2024-02-06 PROCEDURE — 83970 ASSAY OF PARATHORMONE: CPT | Performed by: INTERNAL MEDICINE

## 2024-02-06 PROCEDURE — 85025 COMPLETE CBC W/AUTO DIFF WBC: CPT | Performed by: INTERNAL MEDICINE

## 2024-02-06 PROCEDURE — 36415 COLL VENOUS BLD VENIPUNCTURE: CPT | Mod: PO | Performed by: INTERNAL MEDICINE

## 2024-02-06 PROCEDURE — 80069 RENAL FUNCTION PANEL: CPT | Performed by: INTERNAL MEDICINE

## 2024-02-06 PROCEDURE — 82043 UR ALBUMIN QUANTITATIVE: CPT | Performed by: INTERNAL MEDICINE

## 2024-02-06 PROCEDURE — 84156 ASSAY OF PROTEIN URINE: CPT | Performed by: INTERNAL MEDICINE

## 2024-02-06 PROCEDURE — 84550 ASSAY OF BLOOD/URIC ACID: CPT | Performed by: INTERNAL MEDICINE

## 2024-02-06 PROCEDURE — 82306 VITAMIN D 25 HYDROXY: CPT | Performed by: INTERNAL MEDICINE

## 2024-03-06 ENCOUNTER — OFFICE VISIT (OUTPATIENT)
Dept: HEMATOLOGY/ONCOLOGY | Facility: CLINIC | Age: 69
End: 2024-03-06
Payer: MEDICARE

## 2024-03-06 ENCOUNTER — TELEPHONE (OUTPATIENT)
Dept: HEMATOLOGY/ONCOLOGY | Facility: CLINIC | Age: 69
End: 2024-03-06

## 2024-03-06 VITALS
RESPIRATION RATE: 18 BRPM | WEIGHT: 241 LBS | BODY MASS INDEX: 33.74 KG/M2 | DIASTOLIC BLOOD PRESSURE: 78 MMHG | HEART RATE: 71 BPM | SYSTOLIC BLOOD PRESSURE: 137 MMHG | TEMPERATURE: 98 F | HEIGHT: 71 IN

## 2024-03-06 DIAGNOSIS — D63.1 ANEMIA ASSOCIATED WITH STAGE 2 CHRONIC RENAL FAILURE: ICD-10-CM

## 2024-03-06 DIAGNOSIS — D53.9 NUTRITIONAL ANEMIA: ICD-10-CM

## 2024-03-06 DIAGNOSIS — D50.9 IRON DEFICIENCY ANEMIA, UNSPECIFIED IRON DEFICIENCY ANEMIA TYPE: Primary | ICD-10-CM

## 2024-03-06 DIAGNOSIS — N18.2 ANEMIA ASSOCIATED WITH STAGE 2 CHRONIC RENAL FAILURE: ICD-10-CM

## 2024-03-06 PROCEDURE — 99204 OFFICE O/P NEW MOD 45 MIN: CPT | Mod: S$GLB,,, | Performed by: INTERNAL MEDICINE

## 2024-03-06 RX ORDER — DIAZEPAM 10 MG/1
5 TABLET ORAL 2 TIMES DAILY
COMMUNITY

## 2024-03-06 RX ORDER — HEPARIN 100 UNIT/ML
5 SYRINGE INTRAVENOUS
OUTPATIENT
Start: 2024-03-13

## 2024-03-06 RX ORDER — SODIUM CHLORIDE 0.9 % (FLUSH) 0.9 %
10 SYRINGE (ML) INJECTION
OUTPATIENT
Start: 2024-03-13

## 2024-03-06 RX ORDER — GABAPENTIN 100 MG/1
100 CAPSULE ORAL 3 TIMES DAILY
COMMUNITY

## 2024-03-06 RX ORDER — TAMSULOSIN HYDROCHLORIDE 0.4 MG/1
1 CAPSULE ORAL
COMMUNITY

## 2024-03-06 RX ORDER — DIPHENHYDRAMINE HYDROCHLORIDE 50 MG/ML
50 INJECTION INTRAMUSCULAR; INTRAVENOUS ONCE AS NEEDED
OUTPATIENT
Start: 2024-03-13

## 2024-03-06 RX ORDER — SODIUM CHLORIDE 9 MG/ML
INJECTION, SOLUTION INTRAVENOUS CONTINUOUS
OUTPATIENT
Start: 2024-03-13

## 2024-03-06 RX ORDER — EPINEPHRINE 0.3 MG/.3ML
0.3 INJECTION SUBCUTANEOUS ONCE AS NEEDED
OUTPATIENT
Start: 2024-03-13

## 2024-03-06 RX ORDER — LISINOPRIL 10 MG/1
10 TABLET ORAL
COMMUNITY

## 2024-03-06 NOTE — TELEPHONE ENCOUNTER
He has Fe deficiency anemia.  Orders placed for Injectafer x's 2 weeks.  [facility].    Get auth.    Get date and time.    RTC 3 months.    Check lab 3 months.

## 2024-03-06 NOTE — LETTER
March 29, 2024        Candy Linda MD  [address] [place] [address]             [facility] - Hematology Oncology  [address] [address]  Phone: [phone]  Fax: [phone]   Patient: Lei Hsu   MR Number: [id number]   YOB: 1955   Date of Visit: 3/6/2024       Dear Dr. Linda:    Thank you for referring Lei Hsu to me for evaluation. Below are the relevant portions of my assessment and plan of care.       Iron deficiency anemia, unspecified iron deficiency anemia type  -     Urinalysis; Future; Expected date: 03/13/2024    Nutritional anemia  -     Vitamin B12; Future; Expected date: 03/13/2024  -     Folate; Future; Expected date: 03/13/2024    Anemia associated with stage 2 chronic renal failure  -     Reticulocytes; Future; Expected date: 03/13/2024  -     Erythropoietin; Future; Expected date: 03/13/2024    Other orders  -     ferric carboxymaltose (INJECTAFER) 750 mg in sodium chloride 0.9% 265 mL infusion  -     0.9%  NaCl infusion  -     EPINEPHrine (EPIPEN) 0.3 mg/0.3 mL pen injection 0.3 mg  -     diphenhydrAMINE injection 50 mg  -     hydrocortisone sodium succinate injection 100 mg  -     sodium chloride 0.9% flush 10 mL  -     heparin, porcine (PF) 100 unit/mL injection flush 500 Units  -     sodium chloride 0.9% 100 mL flush bag                If you have questions, please do not hesitate to call me. I look forward to following Lei along with you.    Sincerely,      LORI Ya MD           CC    No Recipients

## 2024-03-13 ENCOUNTER — OFFICE VISIT (OUTPATIENT)
Dept: CARDIOLOGY | Facility: CLINIC | Age: 69
End: 2024-03-13
Payer: MEDICARE

## 2024-03-13 VITALS
BODY MASS INDEX: 33.88 KG/M2 | WEIGHT: 242 LBS | DIASTOLIC BLOOD PRESSURE: 72 MMHG | SYSTOLIC BLOOD PRESSURE: 122 MMHG | OXYGEN SATURATION: 97 % | HEART RATE: 76 BPM | RESPIRATION RATE: 16 BRPM | HEIGHT: 71 IN

## 2024-03-13 DIAGNOSIS — I15.2 HYPERTENSION ASSOCIATED WITH DIABETES: ICD-10-CM

## 2024-03-13 DIAGNOSIS — E66.9 OBESITY WITH BODY MASS INDEX 30 OR GREATER: ICD-10-CM

## 2024-03-13 DIAGNOSIS — E07.9 THYROID DYSFUNCTION: ICD-10-CM

## 2024-03-13 DIAGNOSIS — Z72.0 TOBACCO USE: ICD-10-CM

## 2024-03-13 DIAGNOSIS — E11.59 HYPERTENSION ASSOCIATED WITH DIABETES: ICD-10-CM

## 2024-03-13 DIAGNOSIS — I25.118 CORONARY ARTERY DISEASE OF NATIVE ARTERY OF NATIVE HEART WITH STABLE ANGINA PECTORIS: ICD-10-CM

## 2024-03-13 DIAGNOSIS — E11.69 HYPERLIPIDEMIA ASSOCIATED WITH TYPE 2 DIABETES MELLITUS: ICD-10-CM

## 2024-03-13 DIAGNOSIS — Z95.5 S/P CORONARY ARTERY STENT PLACEMENT: Primary | ICD-10-CM

## 2024-03-13 DIAGNOSIS — E78.5 HYPERLIPIDEMIA ASSOCIATED WITH TYPE 2 DIABETES MELLITUS: ICD-10-CM

## 2024-03-13 DIAGNOSIS — K21.9 GASTROESOPHAGEAL REFLUX DISEASE WITHOUT ESOPHAGITIS: ICD-10-CM

## 2024-03-13 PROBLEM — I77.819 ECTATIC AORTA: Status: RESOLVED | Noted: 2023-05-31 | Resolved: 2024-03-13

## 2024-03-13 PROBLEM — I73.9 PVD (PERIPHERAL VASCULAR DISEASE) WITH CLAUDICATION: Status: RESOLVED | Noted: 2017-04-18 | Resolved: 2024-03-13

## 2024-03-13 PROBLEM — E11.51 PERIPHERAL VASCULAR DISORDER DUE TO DIABETES MELLITUS: Status: RESOLVED | Noted: 2019-04-08 | Resolved: 2024-03-13

## 2024-03-13 PROCEDURE — 99214 OFFICE O/P EST MOD 30 MIN: CPT | Mod: PBBFAC,PN | Performed by: INTERNAL MEDICINE

## 2024-03-13 PROCEDURE — 99999 PR PBB SHADOW E&M-EST. PATIENT-LVL IV: CPT | Mod: PBBFAC,,, | Performed by: INTERNAL MEDICINE

## 2024-03-13 PROCEDURE — 99214 OFFICE O/P EST MOD 30 MIN: CPT | Mod: S$PBB,,, | Performed by: INTERNAL MEDICINE

## 2024-03-13 NOTE — ASSESSMENT & PLAN NOTE
Coronary artery disease status post stent placement he is doing well he was on enteric-coated aspirin daily continue the same.  Clinically stable he is presently on isosorbide mononitrate 30 mg daily encouraged to maintain the same.  Along with risk factor modification with Crestor 5 mg a day

## 2024-03-13 NOTE — ASSESSMENT & PLAN NOTE
His BMI is 33.75 kilograms/meter squared and his weight has gone up to 142 lb.  Need to watch his calorie intake and increase daily physical activity cut back on carbohydrates and maintain low-fat low-cholesterol diet

## 2024-03-13 NOTE — ASSESSMENT & PLAN NOTE
His still vaping at this time.  Which is equally harmful encouraged him to refrain from this and maintain on risk factor modification

## 2024-03-13 NOTE — PROGRESS NOTES
Subjective:    Patient ID:  Lei Hsu is a 69 y.o. male patient here for evaluation Follow-up (Pt is scheduled to receive iron infusions soon)      History of Present Illness:     Patient is a 69-year-old gentleman with history of known coronary artery disease with prior PCI arterial hypertension dyslipidemia seeking follow-up evaluation.  However this is my 1st evaluation seeing this patient.    Patient has been compliant with his medications and he had developed kidney stones had workup for the same.  He is also noted to have iron-deficiency anemia and scheduled to have iron infusion as directed by hematologist.    Patient denies having chest discomfort no arm neck or jaw pain no significant shortness of breath no palpitations dizziness lightheadedness noted.    No cough or congestion no fevers chills no nausea vomiting          Review of patient's allergies indicates:  No Known Allergies    Past Medical History:   Diagnosis Date    Atherosclerosis     CAD (coronary artery disease) 2021    Cardiac murmur     DM (diabetes mellitus) 2021    Hyperlipidemia     Hypertension     Personal history of colonic polyps     Tobacco abuse      Past Surgical History:   Procedure Laterality Date    BACK SURGERY      L3-L4    CORONARY ANGIOGRAPHY N/A 2/15/2022    Procedure: ANGIOGRAM, CORONARY ARTERY;  Surgeon: Howard Burton MD;  Location: [facility] CATH/EP LAB;  Service: Cardiology;  Laterality: N/A;    KNEE ARTHROSCOPY      KNEE SURGERY      LEFT HEART CATHETERIZATION Left 2/15/2022    Procedure: CATHETERIZATION, HEART, LEFT;  Surgeon: Howard Burton MD;  Location: [facility] CATH/EP LAB;  Service: Cardiology;  Laterality: Left;     Social History     Tobacco Use    Smoking status: Former     Current packs/day: 0.00     Average packs/day: 1 pack/day for 40.0 years (40.0 ttl pk-yrs)     Types: Vaping with nicotine, Cigarettes     Start date:      Quit date:      Years since quittin.2    Smokeless  tobacco: Never   Substance Use Topics    Alcohol use: No    Drug use: No        Review of Systems:    As noted in HPI in addition      REVIEW OF SYSTEMS  CARDIOVASCULAR: No recent chest pain, palpitations, arm, neck, or jaw pain  RESPIRATORY: No recent fever, cough chills, SOB or congestion  : No blood in the urine  GI: No Nausea, vomiting, constipation, diarrhea, blood, or reflux.  MUSCULOSKELETAL: No myalgias  NEURO: No lightheadedness or dizziness  EYES: No Double vision, blurry, vision or headache              Objective        Vitals:    03/13/24 1029   BP: 122/72   Pulse: 76   Resp: 16       LIPIDS - LAST 2   Lab Results   Component Value Date    CHOL 125 12/08/2022    CHOL 122 02/18/2021    HDL 39 (L) 12/08/2022    HDL 34 (L) 02/18/2021    LDLCALC 63.0 12/08/2022    LDLCALC 62.8 (L) 02/18/2021    TRIG 115 12/08/2022    TRIG 126 02/18/2021    CHOLHDL 31.2 12/08/2022    CHOLHDL 27.9 02/18/2021       CBC - LAST 2  Lab Results   Component Value Date    WBC 4.46 02/06/2024    WBC 4.43 07/03/2023    RBC 4.35 (L) 02/06/2024    RBC 4.11 (L) 07/03/2023    HGB 11.2 (L) 02/06/2024    HGB 10.2 (L) 07/03/2023    HCT 36.2 (L) 02/06/2024    HCT 34.0 (L) 07/03/2023    MCV 83 02/06/2024    MCV 83 07/03/2023    MCH 25.7 (L) 02/06/2024    MCH 24.8 (L) 07/03/2023    MCHC 30.9 (L) 02/06/2024    MCHC 30.0 (L) 07/03/2023    RDW 14.9 (H) 02/06/2024    RDW 14.0 07/03/2023     (L) 02/06/2024     (L) 07/03/2023    MPV 11.2 02/06/2024    MPV 10.7 07/03/2023    GRAN 2.8 02/06/2024    GRAN 61.8 02/06/2024    LYMPH 1.3 02/06/2024    LYMPH 28.3 02/06/2024    MONO 0.4 02/06/2024    MONO 8.3 02/06/2024    BASO 0.03 02/06/2024    BASO 0.02 07/03/2023    NRBC 0 02/06/2024    NRBC 0 07/03/2023       CHEMISTRY & LIVER FUNCTION - LAST 2  Lab Results   Component Value Date     02/06/2024     07/03/2023    K 4.6 02/06/2024    K 4.8 07/03/2023     02/06/2024     07/03/2023    CO2 22 (L) 02/06/2024    CO2 25  "07/03/2023    ANIONGAP 10 02/06/2024    ANIONGAP 10 07/03/2023    BUN 26 (H) 02/06/2024    BUN 21 07/03/2023    CREATININE 1.3 02/06/2024    CREATININE 1.2 07/03/2023     02/06/2024     (H) 07/03/2023    CALCIUM 9.4 02/06/2024    CALCIUM 8.9 07/03/2023    MG 1.9 05/22/2023    ALBUMIN 4.2 02/06/2024    ALBUMIN 3.9 07/03/2023    PROT 6.9 06/06/2023    PROT 7.7 12/08/2022    ALKPHOS 99 06/06/2023    ALKPHOS 159 (H) 12/08/2022    ALT 12 06/06/2023    ALT 19 12/08/2022    AST 17 06/06/2023    AST 18 12/08/2022    BILITOT 0.2 06/06/2023    BILITOT 0.4 12/08/2022        CARDIAC PROFILE - LAST 2  No results found for: "BNP", "CPK", "CPKMB", "LDH", "TROPONINI", "TROPONINIHS"     COAGULATION - LAST 2  No results found for: "LABPT", "INR", "APTT"    ENDOCRINE & PSA - LAST 2  Lab Results   Component Value Date    HGBA1C 5.6 06/06/2023    HGBA1C 5.6 12/08/2022    TSH 6.284 (H) 06/06/2023    TSH 5.636 (H) 12/08/2022    PSA 0.65 07/03/2023    PSA 0.86 07/20/2021        ECHOCARDIOGRAM RESULTS  Results for orders placed during the hospital encounter of 12/01/21    Echo    Interpretation Summary  · The left ventricle is normal in size with mild concentric hypertrophy and normal systolic function.  · The estimated ejection fraction is 62%.  · Normal left ventricular diastolic function.  · Normal right ventricular size with normal right ventricular systolic function.  · Mild left atrial enlargement.  · Normal central venous pressure (3 mmHg).  · The estimated PA systolic pressure is 24 mmHg.  · Mild tricuspid regurgitation.      CURRENT/PREVIOUS VISIT EKG  Results for orders placed or performed during the hospital encounter of 02/15/22   EKG 12-LEAD on arrival to floor    Collection Time: 02/15/22  9:57 AM    Narrative    Test Reason : I25.10,    Vent. Rate : 061 BPM     Atrial Rate : 061 BPM     P-R Int : 118 ms          QRS Dur : 086 ms      QT Int : 426 ms       P-R-T Axes : 023 118 094 degrees     QTc Int : 428 " ms    Sinus rhythm with Premature atrial complexes  Lateral infarct ,age undetermined  Abnormal ECG  When compared with ECG of 11-FEB-2022 13:12,  Premature atrial complexes are now Present  Lateral infarct is now Present  Confirmed by Ashutosh MIRZA, Kg PILLAI ([id number]) on 2/20/2022 5:54:22 PM    Referred By:             Confirmed By:Kg Boykin MD     No valid procedures specified.   Results for orders placed during the hospital encounter of 12/01/21    Nuclear Stress - Cardiology Interpreted    Interpretation Summary    Abnormal myocardial perfusion scan.    There are two defects.    Defect #1 - There is a moderate to severe intensity, moderate to large sized, mostly reversible with some fixed areas defect in the mid to apical inferior and inferoseptal wall(s).    Defect #2 - There is a small to moderate sized, moderate intensity, mostly fixed with some reversibilty defect in the basal to apical anterior and anteroapical wall(s) in the typical distribution of the LAD territory.    The gated perfusion images showed an ejection fraction of 57% post stress. Normal ejection fraction is greater than 53%.    There is normal wall motion post stress.    LV cavity size is  and normal at stress.    The EKG portion of this study is negative for ischemia.    The patient reported no chest pain during the stress test.    During stress, occasional PACs are noted.    Recommend further cardiac evaluation.    No valid procedures specified.    PHYSICAL EXAM  CONSTITUTIONAL: Well built, well nourished in no apparent distress  Pallor is present  NECK: no carotid bruit, no JVD  LUNGS: CTA  CHEST WALL: no tenderness  HEART: regular rate and rhythm, S1, S2 normal, no murmur, click, rub or gallop   ABDOMEN: soft, non-tender; bowel sounds normal; no masses,  no organomegaly  EXTREMITIES: Extremities normal, no edema, no calf tenderness noted  NEURO: AAO X 3    I HAVE REVIEWED :    The vital signs, nurses notes, and all the pertinent radiology  and labs.        Current Outpatient Medications   Medication Instructions    aspirin (ECOTRIN) 81 mg, Oral, 2 times daily    blood sugar diagnostic Strp Has an [employer] Talk meter- Use daily to check glucose. 99m need. f2f 6/7/22. E11.22    celecoxib (CELEBREX) 200 mg, Oral    diazePAM (VALIUM) 5 mg, Oral, 2 times daily, prn    diphenhydrAMINE (SOMINEX) 25 mg, Oral, Nightly PRN, Pt takes 1 1/2 tabs for sleep at night    gabapentin (NEURONTIN) 100 mg, Oral, 3 times daily    isosorbide mononitrate (IMDUR) 30 MG 24 hr tablet TAKE ONE TABLET BY MOUTH DAILY    lancets (LANCETS,ULTRA THIN) Misc Use daily to check glucose. 99m need. f2f 6/7/22. E11.22    levothyroxine (SYNTHROID) 25 mcg, Oral, Before breakfast    lisinopriL 10 mg, Oral    metFORMIN (GLUCOPHAGE-XR) 500 MG ER 24hr tablet TAKE ONE TABLET BY MOUTH EVERY MORNING, 1 TABLET AT NOON, and 2 TABLETS AT BEDTIME    MS CONTIN 30 mg, Oral, 2 times daily PRN    omeprazole (PRILOSEC) 40 MG capsule TAKE ONE CAPSULE BY MOUTH ONCE DAILY    oxyCODONE (ROXICODONE) 20 mg Tab immediate release tablet 1 tablet, Oral, 4 times daily PRN    rosuvastatin (CRESTOR) 5 mg, Oral, Daily    tamsulosin (FLOMAX) 0.4 mg Cap 1 capsule, Oral          Assessment & Plan     S/P coronary artery stent placement  Coronary artery disease status post stent placement he is doing well he was on enteric-coated aspirin daily continue the same.  Clinically stable he is presently on isosorbide mononitrate 30 mg daily encouraged to maintain the same.  Along with risk factor modification with Crestor 5 mg a day    Hyperlipidemia associated with type 2 diabetes mellitus  Presently he is on Crestor 5 mg daily encouraged encouraged to maintain the same along with low-fat low-cholesterol diet.  Last lipid levels are as follows   Latest Reference Range & Units 12/08/22 09:27   Cholesterol Total 120 - 199 mg/dL 125   HDL 40 - 75 mg/dL 39 (L)   HDL/Cholesterol Ratio 20.0 - 50.0 % 31.2   Non-HDL Cholesterol mg/dL 86    Total Cholesterol/HDL Ratio 2.0 - 5.0  3.2   Triglycerides 30 - 150 mg/dL 115   LDL Cholesterol 63.0 - 159.0 mg/dL 63.0   (L): Data is abnormally low    Hypertension associated with diabetes  Blood pressure has been stable at 120 2/72 mm Hg.  Continue on lisinopril 10 mg a day isosorbide mononitrate 30 mg daily maintain on low-fat low-cholesterol diet    Obesity with body mass index 30 or greater  His BMI is 33.75 kilograms/meter squared and his weight has gone up to 142 lb.  Need to watch his calorie intake and increase daily physical activity cut back on carbohydrates and maintain low-fat low-cholesterol diet    Tobacco use  His still vaping at this time.  Which is equally harmful encouraged him to refrain from this and maintain on risk factor modification    GERD (gastroesophageal reflux disease)  Continue on omeprazole 40 mg daily.    Thyroid dysfunction  Thyroid dysfunction encouraged to follow-up with primary care maintain on Synthroid supplements 25 mcg a day          No follow-ups on file.

## 2024-03-13 NOTE — ASSESSMENT & PLAN NOTE
Presently he is on Crestor 5 mg daily encouraged encouraged to maintain the same along with low-fat low-cholesterol diet.  Last lipid levels are as follows   Latest Reference Range & Units 12/08/22 09:27   Cholesterol Total 120 - 199 mg/dL 125   HDL 40 - 75 mg/dL 39 (L)   HDL/Cholesterol Ratio 20.0 - 50.0 % 31.2   Non-HDL Cholesterol mg/dL 86   Total Cholesterol/HDL Ratio 2.0 - 5.0  3.2   Triglycerides 30 - 150 mg/dL 115   LDL Cholesterol 63.0 - 159.0 mg/dL 63.0   (L): Data is abnormally low

## 2024-03-13 NOTE — ASSESSMENT & PLAN NOTE
Blood pressure has been stable at 120 2/72 mm Hg.  Continue on lisinopril 10 mg a day isosorbide mononitrate 30 mg daily maintain on low-fat low-cholesterol diet

## 2024-03-13 NOTE — ASSESSMENT & PLAN NOTE
Thyroid dysfunction encouraged to follow-up with primary care maintain on Synthroid supplements 25 mcg a day

## 2024-03-16 DIAGNOSIS — M16.11 PRIMARY OSTEOARTHRITIS OF RIGHT HIP: ICD-10-CM

## 2024-03-16 DIAGNOSIS — G89.4 CHRONIC PAIN SYNDROME: ICD-10-CM

## 2024-03-18 RX ORDER — CELECOXIB 200 MG/1
200 CAPSULE ORAL
Qty: 90 CAPSULE | Refills: 0 | Status: SHIPPED | OUTPATIENT
Start: 2024-03-18

## 2024-03-18 NOTE — PROGRESS NOTES
[facility] in office hematology Oncology initial encounter note     March 6, 2024    Subjective:      Patient ID:   Lei Hsu  69 y.o. male  1955  MD Juan Fabian MD      Chief Complaint:   anemia    HPI:  69 y.o. male referred for evaluation of anemia.  He admits to easy fatigue but denies shortness of breath with exertion.  Hemoglobin was 10.5, ferritin was 10, he has been on a multivitamin for 3 or 4 months,  hemoglobin is improved at 11.1.    He has peripheral arterial disease, hypertension, hyperlipidemia, type 2 diabetes, coronary artery disease without angina, history of thrombocytopenia, obesity, and osteoarthritis of multiple joints.  He has history of chronic renal insufficiency.    He is status post coronary artery stent placement, status post right knee replacement.    He has a 40 pack year history of cigarette smoking but quit in 2018.  One pack per day for 40 years.  He does not have a history of alcohol in the past.  He does not have history of allergies to medications.    Medications include Ecotrin, Celebrex, Benadryl, Imdur, Synthroid, Glucophage, Prilosec, oxycodone, Crestor, and MS Contin.    ROS:   GEN: normal without any fever, night sweats or weight loss  HEENT: normal with no HA's, sore throat, stiff neck, changes in vision  CV: See HPI  PULM: normal with no SOB, cough, hemoptysis, sputum or pleuritic pain  GI: normal with no abdominal pain, nausea, vomiting, constipation, diarrhea, melanotic stools, BRBPR, or hematemesis  : renal stones, CRI.  BREAST: normal with no mass, discharge, pain  SKIN: normal with no rash, erythema, bruising, or swelling     Past Medical History:   Diagnosis Date    Atherosclerosis     CAD (coronary artery disease) 01/26/2021    Cardiac murmur     DM (diabetes mellitus) 01/26/2021    Hyperlipidemia     Hypertension     Personal history of colonic polyps     Tobacco abuse      Past Surgical History:   Procedure Laterality  Date    BACK SURGERY      L3-L4    CORONARY ANGIOGRAPHY N/A 2/15/2022    Procedure: ANGIOGRAM, CORONARY ARTERY;  Surgeon: Howard Burton MD;  Location: [facility] CATH/EP LAB;  Service: Cardiology;  Laterality: N/A;    KNEE ARTHROSCOPY      KNEE SURGERY      LEFT HEART CATHETERIZATION Left 2/15/2022    Procedure: CATHETERIZATION, HEART, LEFT;  Surgeon: Howard Burton MD;  Location: [facility] CATH/EP LAB;  Service: Cardiology;  Laterality: Left;       Review of patient's allergies indicates:  No Known Allergies  Social History     Socioeconomic History    Marital status:    Tobacco Use    Smoking status: Former     Current packs/day: 0.00     Average packs/day: 1 pack/day for 40.0 years (40.0 ttl pk-yrs)     Types: Vaping with nicotine, Cigarettes     Start date:      Quit date:      Years since quittin.2    Smokeless tobacco: Never   Substance and Sexual Activity    Alcohol use: No    Drug use: No    Sexual activity: Yes     Partners: Female         Current Outpatient Medications:     aspirin (ECOTRIN) 81 MG EC tablet, Take 81 mg by mouth 2 (two) times a day., Disp: , Rfl:     blood sugar diagnostic Strp, Has an [employer] Talk meter- Use daily to check glucose. 99m need. f2f 22. E11.22, Disp: 100 strip, Rfl: 3    diazePAM (VALIUM) 10 MG Tab, Take 5 mg by mouth 2 (two) times daily. prn, Disp: , Rfl:     diphenhydrAMINE (SOMINEX) 25 mg tablet, Take 25 mg by mouth nightly as needed for Insomnia. Pt takes 1 1/2 tabs for sleep at night, Disp: , Rfl:     gabapentin (NEURONTIN) 100 MG capsule, Take 100 mg by mouth 3 (three) times daily., Disp: , Rfl:     isosorbide mononitrate (IMDUR) 30 MG 24 hr tablet, TAKE ONE TABLET BY MOUTH DAILY, Disp: 30 tablet, Rfl: 11    lancets (LANCETS,ULTRA THIN) Misc, Use daily to check glucose. 99m need. f2f 22. E11.22, Disp: 100 each, Rfl: 3    levothyroxine (SYNTHROID) 25 MCG tablet, Take 1 tablet (25 mcg total) by mouth before breakfast., Disp: 90 tablet, Rfl: 3     "lisinopriL 10 MG tablet, Take 10 mg by mouth., Disp: , Rfl:     metFORMIN (GLUCOPHAGE-XR) 500 MG ER 24hr tablet, TAKE ONE TABLET BY MOUTH EVERY MORNING, 1 TABLET AT NOON, and 2 TABLETS AT BEDTIME, Disp: 360 tablet, Rfl: 0    MS CONTIN 30 mg 12 hr tablet, Take 30 mg by mouth 2 (two) times daily as needed., Disp: , Rfl:     omeprazole (PRILOSEC) 40 MG capsule, TAKE ONE CAPSULE BY MOUTH ONCE DAILY, Disp: 30 capsule, Rfl: 0    oxyCODONE (ROXICODONE) 20 mg Tab immediate release tablet, Take 1 tablet by mouth 4 (four) times daily as needed., Disp: , Rfl:     rosuvastatin (CRESTOR) 5 MG tablet, Take 1 tablet (5 mg total) by mouth once daily., Disp: 90 tablet, Rfl: 0    tamsulosin (FLOMAX) 0.4 mg Cap, Take 1 capsule by mouth., Disp: , Rfl:     celecoxib (CELEBREX) 200 MG capsule, TAKE ONE CAPSULE BY MOUTH ONCE DAILY, Disp: 90 capsule, Rfl: 0          Objective:   Vitals:  Blood pressure 137/78, pulse 71, temperature 98 °F (36.7 °C), resp. rate 18, height 5' 11" (1.803 m), weight 109.3 kg (241 lb).    Physical Examination:   GEN: no apparent distress, comfortable  HEAD: atraumatic and normocephalic  EYES: + pallor, no icterus  ENT: no pharyngeal erythema, external ears WNL; no nasal discharge  NECK: no masses, thyroid normal, trachea midline, no LAD/LN's, supple  CV: RRR with no murmur; normal pulse  CHEST: Normal respiratory effort; CTAB; normal breath sounds; no wheeze or crackles  ABDOM: nontender and nondistended; soft,  no rebound/guarding, L/S NP  MUSC/Skeletal: ROM normal; no crepitus; joints normal  EXTREM: no clubbing, cyanosis, inflammation or swelling  SKIN: no rashes, lesions, ulcers, petechiae  : no cvat  NEURO: grossly intact; motor/sensory WNL;  no tremors  PSYCH: normal mood, affect and behavior  LYMPH: normal cervical, supraclavicular, axillary LN's      Labs:   Lab Results   Component Value Date    WBC 4.46 02/06/2024    HGB 11.2 (L) 02/06/2024    HCT 36.2 (L) 02/06/2024    MCV 83 02/06/2024     " (L) 02/06/2024    CMP  Sodium   Date Value Ref Range Status   02/06/2024 138 136 - 145 mmol/L Final     Potassium   Date Value Ref Range Status   02/06/2024 4.6 3.5 - 5.1 mmol/L Final     Chloride   Date Value Ref Range Status   02/06/2024 106 95 - 110 mmol/L Final     CO2   Date Value Ref Range Status   02/06/2024 22 (L) 23 - 29 mmol/L Final     Glucose   Date Value Ref Range Status   02/06/2024 106 70 - 110 mg/dL Final     BUN   Date Value Ref Range Status   02/06/2024 26 (H) 8 - 23 mg/dL Final     Creatinine   Date Value Ref Range Status   02/06/2024 1.3 0.5 - 1.4 mg/dL Final     Calcium   Date Value Ref Range Status   02/06/2024 9.4 8.7 - 10.5 mg/dL Final     Total Protein   Date Value Ref Range Status   06/06/2023 6.9 6.0 - 8.4 g/dL Final     Albumin   Date Value Ref Range Status   02/06/2024 4.2 3.5 - 5.2 g/dL Final     Total Bilirubin   Date Value Ref Range Status   06/06/2023 0.2 0.1 - 1.0 mg/dL Final     Comment:     For infants and newborns, interpretation of results should be based  on gestational age, weight and in agreement with clinical  observations.    Premature Infant recommended reference ranges:  Up to 24 hours.............<8.0 mg/dL  Up to 48 hours............<12.0 mg/dL  3-5 days..................<15.0 mg/dL  6-29 days.................<15.0 mg/dL       Alkaline Phosphatase   Date Value Ref Range Status   06/06/2023 99 55 - 135 U/L Final     AST   Date Value Ref Range Status   06/06/2023 17 10 - 40 U/L Final     ALT   Date Value Ref Range Status   06/06/2023 12 10 - 44 U/L Final     Anion Gap   Date Value Ref Range Status   02/06/2024 10 8 - 16 mmol/L Final     eGFR if    Date Value Ref Range Status   02/11/2022 >60.0 >60 mL/min/1.73 m^2 Final     eGFR if non    Date Value Ref Range Status   02/11/2022 >60.0 >60 mL/min/1.73 m^2 Final     Comment:     Calculation used to obtain the estimated glomerular filtration  rate (eGFR) is the CKD-EPI equation.             Assessment:   (1) 69 y.o. male with diagnosis of Fe deficiency anemia.  Check folate, B 12, TSH, retic and Epo, U/A.    (2)Check stool for heme.  Will need GI evaluation to check for bleeding site?    (3)Oral Fe 65mg/325mg 1 daily will gradually increase Fe reserves.    (4)IV Fe with injectafer, ferrlicet, venofer infusion weekly will restore Fe reserves sooner than po Fe supplement.  Order weekly at .    RTC4 months with lab.        Plan:       Iron deficiency anemia, unspecified iron deficiency anemia type  -     Urinalysis; Future; Expected date: 03/13/2024    Nutritional anemia  -     Vitamin B12; Future; Expected date: 03/13/2024  -     Folate; Future; Expected date: 03/13/2024    Anemia associated with stage 2 chronic renal failure  -     Reticulocytes; Future; Expected date: 03/13/2024  -     Erythropoietin; Future; Expected date: 03/13/2024    Other orders  -     ferric carboxymaltose (INJECTAFER) 750 mg in sodium chloride 0.9% 265 mL infusion  -     0.9%  NaCl infusion  -     EPINEPHrine (EPIPEN) 0.3 mg/0.3 mL pen injection 0.3 mg  -     diphenhydrAMINE injection 50 mg  -     hydrocortisone sodium succinate injection 100 mg  -     sodium chloride 0.9% flush 10 mL  -     heparin, porcine (PF) 100 unit/mL injection flush 500 Units  -     sodium chloride 0.9% 100 mL flush bag

## 2024-03-18 NOTE — TELEPHONE ENCOUNTER
Refill Routing Note   Medication(s) are not appropriate for processing by [facility] for the following reason(s):        Outside of protocol    [facility] action(s):  Route               Appointments  past 12m or future 3m with PCP    Date Provider   Last Visit   6/6/2023 Candy Linda MD   Next Visit   Visit date not found Candy Linda MD   ED visits in past 90 days: 0        Note composed:11:49 AM 03/18/2024

## 2024-04-01 RX ORDER — OMEPRAZOLE 40 MG/1
40 CAPSULE, DELAYED RELEASE ORAL DAILY
Qty: 90 CAPSULE | Refills: 3 | Status: SHIPPED | OUTPATIENT
Start: 2024-04-01 | End: 2025-04-01

## 2024-04-09 DIAGNOSIS — E11.22 TYPE 2 DIABETES MELLITUS WITH STAGE 3A CHRONIC KIDNEY DISEASE, WITHOUT LONG-TERM CURRENT USE OF INSULIN: ICD-10-CM

## 2024-04-09 DIAGNOSIS — N18.31 TYPE 2 DIABETES MELLITUS WITH STAGE 3A CHRONIC KIDNEY DISEASE, WITHOUT LONG-TERM CURRENT USE OF INSULIN: ICD-10-CM

## 2024-04-09 RX ORDER — METFORMIN HYDROCHLORIDE 500 MG/1
TABLET, EXTENDED RELEASE ORAL
Qty: 360 TABLET | Refills: 0 | Status: SHIPPED | OUTPATIENT
Start: 2024-04-09

## 2024-04-09 NOTE — TELEPHONE ENCOUNTER
Care Due:                  Date            Visit Type   Department     Provider  --------------------------------------------------------------------------------                                EP -                              PRIMARY  Last Visit: 06-      CARE ([facility])   None Found     Candy Linda  Next Visit: None Scheduled  None         None Found                                                            Last  Test          Frequency    Reason                     Performed    Due Date  --------------------------------------------------------------------------------    Office Visit  12 months..  celecoxib................  06- 05-    CMP.........  12 months..  celecoxib, rosuvastatin..  06- 05-    HBA1C.......  6 months...  metFORMIN................  06- 12-    Lipid Panel.  12 months..  rosuvastatin.............  12- 12-    TSH.........  12 months..  levothyroxine............  06- 05-    [facility] Embedded Care Due Messages. Reference number: [id number].   4/09/2024 4:11:53 PM CDT

## 2024-05-17 RX ORDER — LEVOTHYROXINE SODIUM 25 UG/1
25 TABLET ORAL
Qty: 90 TABLET | Refills: 0 | Status: SHIPPED | OUTPATIENT
Start: 2024-05-17

## 2024-05-18 NOTE — TELEPHONE ENCOUNTER
Refill Decision Note   Lei Hsu  is requesting a refill authorization.  Brief Assessment and Rationale for Refill:  Approve     Medication Therapy Plan:         Comments:     Note composed:10:21 PM 05/17/2024             Appointments     Last Visit   6/6/2023 Candy Linda MD   Next Visit   Visit date not found Candy Linda MD

## 2024-05-18 NOTE — TELEPHONE ENCOUNTER
Care Due:                  Date            Visit Type   Department     Provider  --------------------------------------------------------------------------------                                EP -                              PRIMARY  Last Visit: 06-      CARE ([facility])   None Found     Candy Linda  Next Visit: None Scheduled  None         None Found                                                            Last  Test          Frequency    Reason                     Performed    Due Date  --------------------------------------------------------------------------------    HBA1C.......  6 months...  metFORMIN................  12- 06-    Lipid Panel.  12 months..  rosuvastatin.............  12- 12-    [employer] Embedded Care Due Messages. Reference number: [id number].   5/17/2024 10:21:43 PM CDT

## 2024-06-06 ENCOUNTER — OFFICE VISIT (OUTPATIENT)
Facility: CLINIC | Age: 69
End: 2024-06-06
Payer: MEDICARE

## 2024-06-06 VITALS
BODY MASS INDEX: 32.6 KG/M2 | HEART RATE: 73 BPM | TEMPERATURE: 98 F | RESPIRATION RATE: 18 BRPM | WEIGHT: 240.69 LBS | DIASTOLIC BLOOD PRESSURE: 78 MMHG | SYSTOLIC BLOOD PRESSURE: 139 MMHG | HEIGHT: 72 IN

## 2024-06-06 DIAGNOSIS — D50.9 IRON DEFICIENCY ANEMIA, UNSPECIFIED IRON DEFICIENCY ANEMIA TYPE: Primary | ICD-10-CM

## 2024-06-06 PROCEDURE — 99999 PR PBB SHADOW E&M-EST. PATIENT-LVL IV: CPT | Mod: PBBFAC,,, | Performed by: INTERNAL MEDICINE

## 2024-06-06 PROCEDURE — 99215 OFFICE O/P EST HI 40 MIN: CPT | Mod: S$PBB,,, | Performed by: INTERNAL MEDICINE

## 2024-06-06 PROCEDURE — G2211 COMPLEX E/M VISIT ADD ON: HCPCS | Mod: S$PBB,,, | Performed by: INTERNAL MEDICINE

## 2024-06-06 PROCEDURE — 99214 OFFICE O/P EST MOD 30 MIN: CPT | Mod: PBBFAC,PN | Performed by: INTERNAL MEDICINE

## 2024-06-06 NOTE — PROGRESS NOTES
[facility] [address] Hematology Oncology In Office Subsequent Encounter Note    6/6/24     Subjective:      Patient ID:   Lei Hsu  69 y.o. male  1955  MD Juan Fabian MD      Chief Complaint:   anemia    HPI:  69 y.o. male referred for evaluation of anemia.  He admits to easy fatigue but denies shortness of breath with exertion.  Hemoglobin was 10.5, ferritin was 10, he has been on a multivitamin for 3 or 4 months.    He completed injectaferr infusion x2 through [facility].  Energy level is improved at a 6/10.  Hemoglobin has improved from 10.5-12.3 at this time.  Reviewed with him and gave him the supplies to check his stools for occult blood.  He will check this weekly x3 weeks and turn in the packet to us here.  I have asked him to get a repeat CBC and ferritin towards the end of July and follow-up here in early August.    He has peripheral arterial disease, hypertension, hyperlipidemia, type 2 diabetes, coronary artery disease without angina, history of thrombocytopenia, obesity, and osteoarthritis of multiple joints.  He has history of chronic renal insufficiency.    He is status post coronary artery stent placement, status post right knee replacement.  He is wearing a wrap at the left knee, he aggravated his knee condition while placing a generator in his shed.  He may need knee replacement eventually?    He has a 40 pack year history of cigarette smoking but quit in 2018.  One pack per day for 40 years.  He does not have a history of alcohol in the past.  He does not have history of allergies to medications.    Medications include Ecotrin, Celebrex, Benadryl, Imdur, Synthroid, Glucophage, Prilosec, oxycodone, Crestor, and MS Contin.    ROS:   GEN: normal without any fever, night sweats or weight loss  HEENT: normal with no HA's, sore throat, stiff neck, changes in vision  CV: See HPI  PULM: normal with no SOB, cough, hemoptysis, sputum or pleuritic pain  GI: normal  with no abdominal pain, nausea, vomiting, constipation, diarrhea, melanotic stools, BRBPR, or hematemesis  : renal stones, CRI.  BREAST: normal with no mass, discharge, pain  SKIN: normal with no rash, erythema, bruising, or swelling     Past Medical History:   Diagnosis Date    Atherosclerosis     CAD (coronary artery disease) 2021    Cardiac murmur     DM (diabetes mellitus) 2021    Hyperlipidemia     Hypertension     Personal history of colonic polyps     Tobacco abuse      Past Surgical History:   Procedure Laterality Date    BACK SURGERY      L3-L4    CORONARY ANGIOGRAPHY N/A 2/15/2022    Procedure: ANGIOGRAM, CORONARY ARTERY;  Surgeon: Howard Burton MD;  Location: [facility] CATH/EP LAB;  Service: Cardiology;  Laterality: N/A;    KNEE ARTHROSCOPY      KNEE SURGERY      LEFT HEART CATHETERIZATION Left 2/15/2022    Procedure: CATHETERIZATION, HEART, LEFT;  Surgeon: Howard Burton MD;  Location: [facility] CATH/EP LAB;  Service: Cardiology;  Laterality: Left;       Review of patient's allergies indicates:  No Known Allergies  Social History     Socioeconomic History    Marital status:    Tobacco Use    Smoking status: Former     Current packs/day: 0.00     Average packs/day: 1 pack/day for 40.0 years (40.0 ttl pk-yrs)     Types: Vaping with nicotine, Cigarettes     Start date:      Quit date: 2018     Years since quittin.4    Smokeless tobacco: Never   Substance and Sexual Activity    Alcohol use: No    Drug use: No    Sexual activity: Yes     Partners: Female     Social Determinants of Health     Financial Resource Strain: Low Risk  (2023)    [facility]    Overall Financial Resource Strain (CARDIA)     Difficulty of Paying Living Expenses: Not hard at all   Food Insecurity: No Food Insecurity (2023)    Received from [facility]  [facility]    Hunger Vital Sign     Worried About Running Out of Food in the Last Year: Never true     Ran Out of Food in the Last Year: Never true   Transportation Needs: No Transportation Needs (12/19/2023)    [facility]    PRAPARE - Transportation     Lack of Transportation (Medical): No     Lack of Transportation (Non-Medical): No   Physical Activity: Inactive (12/19/2023)    [facility] from [facility], [facility]    Exercise Vital Sign     Days of Exercise per Week: 0 days     Minutes of Exercise per Session: 0 min   Stress: No Stress Concern Present (12/19/2023)    Received from [facility], [facility]    [employer] - Occupational Stress Questionnaire     Feeling of Stress : Not at all   Housing Stability: Low Risk  (12/19/2023)    Received from [facility], [facility]    Housing Stability Vital Sign     Unable to Pay for Housing in the Last Year: No     Number of Places Lived in the Last Year: 1     In the last 12 months, was there a time when you did not have a steady place to sleep or slept in a shelter (including now)?: No         Current Outpatient Medications:     aspirin (ECOTRIN) 81 MG EC tablet, Take 81 mg by mouth 2 (two) times a day., Disp: , Rfl:     blood sugar diagnostic Strp, Has an Em[employer] Talk meter- Use daily to check glucose. 99m need. f2f 6/7/22. E11.22, Disp: 100 strip, Rfl: 3    celecoxib (CELEBREX) 200 MG capsule, TAKE ONE CAPSULE BY MOUTH ONCE DAILY, Disp: 90 capsule, Rfl: 0    diazePAM (VALIUM) 10 MG Tab, Take 5 mg by mouth 2 (two) times daily. prn, Disp: , Rfl:     diphenhydrAMINE (SOMINEX) 25 mg tablet, Take 25 mg by  mouth nightly as needed for Insomnia. Pt takes 1 1/2 tabs for sleep at night, Disp: , Rfl:     gabapentin (NEURONTIN) 100 MG capsule, Take 100 mg by mouth 3 (three) times daily., Disp: , Rfl:     isosorbide mononitrate (IMDUR) 30 MG 24 hr tablet, TAKE ONE TABLET BY MOUTH DAILY, Disp: 30 tablet, Rfl: 11    lancets (LANCETS,ULTRA THIN) Misc, Use daily to check glucose. 99m need. f2f 6/7/22. E11.22, Disp: 100 each, Rfl: 3    levothyroxine (SYNTHROID) 25 MCG tablet, TAKE ONE TABLET BY MOUTH DAILY BEFORE BREAKFAST, Disp: 90 tablet, Rfl: 0    lisinopriL 10 MG tablet, Take 10 mg by mouth., Disp: , Rfl:     metFORMIN (GLUCOPHAGE-XR) 500 MG ER 24hr tablet, TAKE ONE TABLET BY MOUTH EVERY MORNING, 1 TABLET AT NOON, and 2 TABLETS AT BEDTIME, Disp: 360 tablet, Rfl: 0    MS CONTIN 30 mg 12 hr tablet, Take 30 mg by mouth 2 (two) times daily as needed., Disp: , Rfl:     omeprazole (PRILOSEC) 40 MG capsule, Take 1 capsule (40 mg total) by mouth Daily., Disp: 90 capsule, Rfl: 3    oxyCODONE (ROXICODONE) 20 mg Tab immediate release tablet, Take 1 tablet by mouth 4 (four) times daily as needed., Disp: , Rfl:     rosuvastatin (CRESTOR) 5 MG tablet, Take 1 tablet (5 mg total) by mouth once daily., Disp: 90 tablet, Rfl: 0    tamsulosin (FLOMAX) 0.4 mg Cap, Take 1 capsule by mouth., Disp: , Rfl:           Objective:   Vitals:  Blood pressure 139/78, pulse 73, temperature 97.6 °F (36.4 °C), resp. rate 18, height 6' (1.829 m), weight 109.2 kg (240 lb 11.2 oz).    Physical Examination:   GEN: no apparent distress, comfortable  HEAD: atraumatic and normocephalic  EYES: + pallor, no icterus  ENT: no pharyngeal erythema, external ears WNL; no nasal discharge  NECK: no masses, thyroid normal, trachea midline, no LAD/LN's, supple  CV: RRR with no murmur; normal pulse  CHEST: Normal respiratory effort; CTAB; normal breath sounds; no wheeze or crackles  ABDOM: nontender and nondistended; soft,  no rebound/guarding, L/S NP  MUSC/Skeletal: ROM normal;  no crepitus; joints normal  EXTREM: no clubbing, cyanosis, inflammation or swelling  SKIN: no rashes, lesions, ulcers, petechiae  : no cvat  NEURO: grossly intact; motor/sensory WNL;  no tremors  PSYCH: normal mood, affect and behavior  LYMPH: normal cervical, supraclavicular, axillary LN's      Assessment:   (1) 69 y.o. male with diagnosis of Fe deficiency anemia.      (2)Check stool for heme.  Will need GI evaluation to check for bleeding site?  Pending.    (3)S/P Injectafer x's 2 infusions.    (4)Hgb better at 12.3 Ferritin now 102, EPO 12.8.    RTC 2 months with lab.

## 2024-07-09 ENCOUNTER — TELEPHONE (OUTPATIENT)
Facility: CLINIC | Age: 69
End: 2024-07-09
Payer: MEDICARE

## 2024-07-09 ENCOUNTER — LAB VISIT (OUTPATIENT)
Dept: LAB | Facility: HOSPITAL | Age: 69
End: 2024-07-09
Attending: INTERNAL MEDICINE
Payer: MEDICARE

## 2024-07-09 DIAGNOSIS — D63.1 ANEMIA ASSOCIATED WITH STAGE 2 CHRONIC RENAL FAILURE: ICD-10-CM

## 2024-07-09 DIAGNOSIS — D50.9 IRON DEFICIENCY ANEMIA, UNSPECIFIED IRON DEFICIENCY ANEMIA TYPE: ICD-10-CM

## 2024-07-09 DIAGNOSIS — D53.9 NUTRITIONAL ANEMIA: ICD-10-CM

## 2024-07-09 DIAGNOSIS — D53.9 NUTRITIONAL ANEMIA: Primary | ICD-10-CM

## 2024-07-09 DIAGNOSIS — N18.2 ANEMIA ASSOCIATED WITH STAGE 2 CHRONIC RENAL FAILURE: ICD-10-CM

## 2024-07-09 LAB
OB PNL STL: NEGATIVE

## 2024-07-09 PROCEDURE — 82272 OCCULT BLD FECES 1-3 TESTS: CPT | Mod: 91 | Performed by: INTERNAL MEDICINE

## 2024-08-06 ENCOUNTER — TELEPHONE (OUTPATIENT)
Facility: CLINIC | Age: 69
End: 2024-08-06
Payer: MEDICARE

## 2024-08-08 ENCOUNTER — OFFICE VISIT (OUTPATIENT)
Facility: CLINIC | Age: 69
End: 2024-08-08
Payer: MEDICARE

## 2024-08-08 VITALS
TEMPERATURE: 98 F | WEIGHT: 237.81 LBS | DIASTOLIC BLOOD PRESSURE: 79 MMHG | RESPIRATION RATE: 16 BRPM | HEART RATE: 86 BPM | BODY MASS INDEX: 32.25 KG/M2 | SYSTOLIC BLOOD PRESSURE: 126 MMHG

## 2024-08-08 DIAGNOSIS — D50.0 IRON DEFICIENCY ANEMIA DUE TO CHRONIC BLOOD LOSS: Primary | ICD-10-CM

## 2024-08-08 DIAGNOSIS — E53.8 VITAMIN B12 DEFICIENCY DUE TO INTESTINAL MALABSORPTION: ICD-10-CM

## 2024-08-08 DIAGNOSIS — K90.9 VITAMIN B12 DEFICIENCY DUE TO INTESTINAL MALABSORPTION: ICD-10-CM

## 2024-08-08 PROCEDURE — 99214 OFFICE O/P EST MOD 30 MIN: CPT | Mod: S$PBB,,, | Performed by: INTERNAL MEDICINE

## 2024-08-08 PROCEDURE — G2211 COMPLEX E/M VISIT ADD ON: HCPCS | Mod: S$PBB,,, | Performed by: INTERNAL MEDICINE

## 2024-08-08 PROCEDURE — 99999 PR PBB SHADOW E&M-EST. PATIENT-LVL IV: CPT | Mod: PBBFAC,,, | Performed by: INTERNAL MEDICINE

## 2024-08-08 PROCEDURE — 99214 OFFICE O/P EST MOD 30 MIN: CPT | Mod: PBBFAC,PN | Performed by: INTERNAL MEDICINE

## 2024-08-10 NOTE — PROGRESS NOTES
[facility] [address] Hematology Oncology In Office Subsequent Encounter Note    6/6/24     Subjective:      Patient ID:   Lei Hsu  69 y.o. male  1955  NENITA Trinidad MD      Chief Complaint:   anemia    HPI:  69 y.o. male referred for evaluation of anemia.  He admits to easy fatigue but denies shortness of breath with exertion.  Hemoglobin was 10.5, ferritin was 10, he has been on a multivitamin for 3 or 4 months.  Previous EGD and colonoscopy lip was less than 1 year ago.  Stools for occult blood are negative x3 now.  Injectafer x2 has been given.  Hemoglobin has gone from 12.3-12.6 and his ferritin level is up to 126 with an erythropoietin level of 12.8.  Previous endoscopy from August 29, 2023 showed gastritis and a small polyp was removed with the colonoscopy.  B12 is stable at 365.    He completed injectaferr infusion x2 through [facility].  Energy level is improved at a 6/10.  Hemoglobin has improved from 10.5-12.3 at this time.  Reviewed with him and gave him the supplies to check his stools for occult blood.  He will check this weekly x3 weeks and turn in the packet to us here.  I have asked him to get a repeat CBC and ferritin towards the end of July and follow-up here in early August.  Stool for heme was negative.    Current CBC, Ferritin are as above.    He has peripheral arterial disease, hypertension, hyperlipidemia, type 2 diabetes, coronary artery disease without angina, history of thrombocytopenia, obesity, and osteoarthritis of multiple joints.  He has history of chronic renal insufficiency.    He is status post coronary artery stent placement, status post right knee replacement.  He is wearing a wrap at the left knee, he aggravated his knee condition while placing a generator in his shed.  He may need knee replacement eventually?    He has a 40 pack year history of cigarette smoking but quit in 2018.  One pack per day for 40 years.  He does not have a  history of alcohol in the past.  He does not have history of allergies to medications.    Medications include Ecotrin, Celebrex, Benadryl, Imdur, Synthroid, Glucophage, Prilosec, oxycodone, Crestor, and MS Contin.    ROS:   GEN: normal without any fever, night sweats or weight loss  HEENT: normal with no HA's, sore throat, stiff neck, changes in vision  CV: See HPI  PULM: normal with no SOB, cough, hemoptysis, sputum or pleuritic pain  GI: normal with no abdominal pain, nausea, vomiting, constipation, diarrhea, melanotic stools, BRBPR, or hematemesis  : renal stones, CRI.  BREAST: normal with no mass, discharge, pain  SKIN: normal with no rash, erythema, bruising, or swelling     Past Medical History:   Diagnosis Date    Atherosclerosis     CAD (coronary artery disease) 2021    Cardiac murmur     DM (diabetes mellitus) 2021    Hyperlipidemia     Hypertension     Personal history of colonic polyps     Tobacco abuse      Past Surgical History:   Procedure Laterality Date    BACK SURGERY      L3-L4    CORONARY ANGIOGRAPHY N/A 2/15/2022    Procedure: ANGIOGRAM, CORONARY ARTERY;  Surgeon: Howard Burton MD;  Location: [facility] CATH/EP LAB;  Service: Cardiology;  Laterality: N/A;    KNEE ARTHROSCOPY      KNEE SURGERY      LEFT HEART CATHETERIZATION Left 2/15/2022    Procedure: CATHETERIZATION, HEART, LEFT;  Surgeon: Howard Burton MD;  Location: [facility] CATH/EP LAB;  Service: Cardiology;  Laterality: Left;       Review of patient's allergies indicates:  No Known Allergies  Social History     Socioeconomic History    Marital status:    Tobacco Use    Smoking status: Former     Current packs/day: 0.00     Average packs/day: 1 pack/day for 40.0 years (40.0 ttl pk-yrs)     Types: Vaping with nicotine, Cigarettes     Start date:      Quit date: 2018     Years since quittin.6    Smokeless tobacco: Never   Substance and Sexual Activity    Alcohol use: No    Drug use: No    Sexual activity: Yes      Partners: Female     Social Determinants of Health     Financial Resource Strain: Low Risk  (12/19/2023)    Received from [facility], [facility]    Overall Financial Resource Strain (CARDIA)     Difficulty of Paying Living Expenses: Not hard at all   Food Insecurity: No Food Insecurity (12/19/2023)    Received from [facility], [facility]    Hunger Vital Sign     Worried About Running Out of Food in the Last Year: Never true     Ran Out of Food in the Last Year: Never true   Transportation Needs: No Transportation Needs (12/19/2023)    Received from [facility], [facility]    PRAPARE - Transportation     Lack of Transportation (Medical): No     Lack of Transportation (Non-Medical): No   Physical Activity: Inactive (12/19/2023)    Received from [facility], [facility]    Exercise Vital Sign     Days of Exercise per Week: 0 days     Minutes of Exercise per Session: 0 min   Stress: No Stress Concern Present (12/19/2023)    Received from [facility], [facility]    [employer] - Occupational Stress Questionnaire     Feeling of Stress : Not at all   Housing Stability: Low Risk  (12/19/2023)    Received from [facility], [facility]    Housing Stability Vital Sign     Unable to Pay for Housing in the Last Year: No     Number of Places Lived in the Last Year: 1     Unstable Housing in the Last Year: No         Current Outpatient Medications:     aspirin (ECOTRIN) 81 MG EC tablet, Take 81 mg by mouth 2 (two) times a day., Disp: , Rfl:     blood sugar diagnostic Strp,  Has an [employer] Talk meter- Use daily to check glucose. 99m need. f2f 6/7/22. E11.22, Disp: 100 strip, Rfl: 3    celecoxib (CELEBREX) 200 MG capsule, TAKE ONE CAPSULE BY MOUTH ONCE DAILY, Disp: 90 capsule, Rfl: 0    diazePAM (VALIUM) 10 MG Tab, Take 5 mg by mouth 2 (two) times daily. prn, Disp: , Rfl:     diphenhydrAMINE (SOMINEX) 25 mg tablet, Take 25 mg by mouth nightly as needed for Insomnia. Pt takes 1 1/2 tabs for sleep at night, Disp: , Rfl:     gabapentin (NEURONTIN) 100 MG capsule, Take 100 mg by mouth 3 (three) times daily., Disp: , Rfl:     isosorbide mononitrate (IMDUR) 30 MG 24 hr tablet, TAKE ONE TABLET BY MOUTH DAILY, Disp: 30 tablet, Rfl: 11    lancets (LANCETS,ULTRA THIN) Misc, Use daily to check glucose. 99m need. f2f 6/7/22. E11.22, Disp: 100 each, Rfl: 3    levothyroxine (SYNTHROID) 25 MCG tablet, TAKE ONE TABLET BY MOUTH DAILY BEFORE BREAKFAST, Disp: 90 tablet, Rfl: 0    lisinopriL 10 MG tablet, Take 10 mg by mouth., Disp: , Rfl:     metFORMIN (GLUCOPHAGE-XR) 500 MG ER 24hr tablet, TAKE ONE TABLET BY MOUTH EVERY MORNING, 1 TABLET AT NOON, and 2 TABLETS AT BEDTIME, Disp: 360 tablet, Rfl: 0    MS CONTIN 30 mg 12 hr tablet, Take 30 mg by mouth 2 (two) times daily as needed., Disp: , Rfl:     omeprazole (PRILOSEC) 40 MG capsule, Take 1 capsule (40 mg total) by mouth Daily., Disp: 90 capsule, Rfl: 3    oxyCODONE (ROXICODONE) 20 mg Tab immediate release tablet, Take 1 tablet by mouth 4 (four) times daily as needed., Disp: , Rfl:     rosuvastatin (CRESTOR) 5 MG tablet, Take 1 tablet (5 mg total) by mouth once daily., Disp: 90 tablet, Rfl: 0    tamsulosin (FLOMAX) 0.4 mg Cap, Take 1 capsule by mouth., Disp: , Rfl:           Objective:   Vitals:  Blood pressure 126/79, pulse 86, temperature 97.9 °F (36.6 °C), resp. rate 16, weight 107.9 kg (237 lb 12.8 oz).    Physical Examination:   GEN: no apparent distress, comfortable  HEAD: atraumatic and normocephalic  EYES: + pallor, no icterus  ENT: no pharyngeal  erythema, external ears WNL; no nasal discharge  NECK: no masses, thyroid normal, trachea midline, no LAD/LN's, supple  CV: RRR with no murmur; normal pulse  CHEST: Normal respiratory effort; CTAB; normal breath sounds; no wheeze or crackles  ABDOM: nontender and nondistended; soft,  no rebound/guarding, L/S NP  MUSC/Skeletal: ROM normal; no crepitus; joints normal  EXTREM: no clubbing, cyanosis, inflammation or swelling  SKIN: no rashes, lesions, ulcers, petechiae  : no cvat  NEURO: grossly intact; motor/sensory WNL;  no tremors  PSYCH: normal mood, affect and behavior  LYMPH: normal cervical, supraclavicular, axillary LN's      Assessment:   (1) 69 y.o. male with diagnosis of Fe deficiency anemia.      (2)Stool heme negative now.  EGD gastritis, Colonoscopy polyp removed.  8/29/23.    (3)S/P Injectafer x's 2 infusions.  Given.    (4)Hgb better at 12.6, Ferritin now 126, EPO 12.8.    RTC 6 months with lab.

## 2024-08-15 ENCOUNTER — PATIENT MESSAGE (OUTPATIENT)
Dept: FAMILY MEDICINE | Facility: CLINIC | Age: 69
End: 2024-08-15
Payer: MEDICARE

## 2024-09-24 ENCOUNTER — OFFICE VISIT (OUTPATIENT)
Dept: CARDIOLOGY | Facility: CLINIC | Age: 69
End: 2024-09-24
Payer: MEDICARE

## 2024-09-24 VITALS
SYSTOLIC BLOOD PRESSURE: 110 MMHG | BODY MASS INDEX: 32.1 KG/M2 | RESPIRATION RATE: 16 BRPM | WEIGHT: 237 LBS | DIASTOLIC BLOOD PRESSURE: 70 MMHG | OXYGEN SATURATION: 98 % | HEIGHT: 72 IN | HEART RATE: 85 BPM

## 2024-09-24 DIAGNOSIS — Z95.5 S/P CORONARY ARTERY STENT PLACEMENT: ICD-10-CM

## 2024-09-24 DIAGNOSIS — E11.59 HYPERTENSION ASSOCIATED WITH DIABETES: ICD-10-CM

## 2024-09-24 DIAGNOSIS — E78.5 HYPERLIPIDEMIA ASSOCIATED WITH TYPE 2 DIABETES MELLITUS: ICD-10-CM

## 2024-09-24 DIAGNOSIS — E07.9 THYROID DYSFUNCTION: ICD-10-CM

## 2024-09-24 DIAGNOSIS — E11.21 DIABETIC NEPHROPATHY ASSOCIATED WITH TYPE 2 DIABETES MELLITUS: ICD-10-CM

## 2024-09-24 DIAGNOSIS — I25.118 CORONARY ARTERY DISEASE OF NATIVE ARTERY OF NATIVE HEART WITH STABLE ANGINA PECTORIS: Primary | ICD-10-CM

## 2024-09-24 DIAGNOSIS — I15.2 HYPERTENSION ASSOCIATED WITH DIABETES: ICD-10-CM

## 2024-09-24 DIAGNOSIS — E11.69 HYPERLIPIDEMIA ASSOCIATED WITH TYPE 2 DIABETES MELLITUS: ICD-10-CM

## 2024-09-24 PROCEDURE — 99214 OFFICE O/P EST MOD 30 MIN: CPT | Mod: S$PBB,,, | Performed by: INTERNAL MEDICINE

## 2024-09-24 PROCEDURE — 99999 PR PBB SHADOW E&M-EST. PATIENT-LVL IV: CPT | Mod: PBBFAC,,, | Performed by: INTERNAL MEDICINE

## 2024-09-24 PROCEDURE — 99214 OFFICE O/P EST MOD 30 MIN: CPT | Mod: PBBFAC,PN | Performed by: INTERNAL MEDICINE

## 2024-09-24 NOTE — ASSESSMENT & PLAN NOTE
Patient was doing well from angina standpoint.  He was on isosorbide mononitrate because of recurrent episodes of dizziness will stop this medicine as well.  Continue on aspirin therapy and Crestor 5 mg nightly.  Maintain on low-fat low-cholesterol diet

## 2024-09-24 NOTE — ASSESSMENT & PLAN NOTE
Last angiogram shows patent stents and no significant obstructive disease continue on risk factor modification as mentioned above

## 2024-09-24 NOTE — ASSESSMENT & PLAN NOTE
Although blood pressure is 110/70 mm Hg today recommend to start the isosorbide mononitrate is continues to have fairly symptomatic he was off lisinopril completely suspected developing progressive autonomic dysfunction.  He is on tamsulosin for urologic symptoms and defer this to urology management.

## 2024-09-24 NOTE — ASSESSMENT & PLAN NOTE
As below his lipid levels are well controlled LDL C is 63 non-HDL is 86 continue on Crestor 5 mg nightly maintain low-fat low-cholesterol diet

## 2024-09-24 NOTE — PROGRESS NOTES
Subjective:    Patient ID:  Lei Hsu is a 69 y.o. male patient here for evaluation Hypertension, Hyperlipidemia, and Coronary Artery Disease      History of Present Illness:   Is a 67-year-old gentleman with history of known coronary artery disease arteriosclerosis type 2 diabetes arterial hypertension reportedly about a month ago he had profound low blood pressure and he had fallen.  Is since then he has stopped taking lisinopril with some improvement his still has episodes of dizziness and lightheadedness.  No occurrence of any angina occasional atypical chest pain sharp pains noted that resolved spontaneously.  This has no swelling in the lower extremities no arm neck or jaw pain noted no exertional angina.  His previous angiogram has demonstrated nonobstructive coronary disease without any high-grade lesions            Review of patient's allergies indicates:  No Known Allergies    Past Medical History:   Diagnosis Date    Atherosclerosis     CAD (coronary artery disease) 2021    Cardiac murmur     DM (diabetes mellitus) 2021    Hyperlipidemia     Hypertension     Personal history of colonic polyps     Tobacco abuse      Past Surgical History:   Procedure Laterality Date    BACK SURGERY      L3-L4    CORONARY ANGIOGRAPHY N/A 2/15/2022    Procedure: ANGIOGRAM, CORONARY ARTERY;  Surgeon: Howard Burton MD;  Location: [facility] CATH/EP LAB;  Service: Cardiology;  Laterality: N/A;    KNEE ARTHROSCOPY      KNEE SURGERY      LEFT HEART CATHETERIZATION Left 2/15/2022    Procedure: CATHETERIZATION, HEART, LEFT;  Surgeon: Howard Burton MD;  Location: [facility] CATH/EP LAB;  Service: Cardiology;  Laterality: Left;     Social History     Tobacco Use    Smoking status: Former     Current packs/day: 0.00     Average packs/day: 1 pack/day for 40.0 years (40.0 ttl pk-yrs)     Types: Vaping with nicotine, Cigarettes     Start date:      Quit date: 2018     Years since quittin.7    Smokeless tobacco: Never    Substance Use Topics    Alcohol use: No    Drug use: No        Review of Systems:    As noted in HPI in addition      REVIEW OF SYSTEMS  CARDIOVASCULAR: No recent chest pain, palpitations, arm, neck, or jaw pain  RESPIRATORY: No recent fever, cough chills, SOB or congestion  : No blood in the urine  GI: No Nausea, vomiting, constipation, diarrhea, blood, or reflux.  MUSCULOSKELETAL: No myalgias  NEURO: No lightheadedness or dizziness  EYES: No Double vision, blurry, vision or headache              Objective        Vitals:    09/24/24 0952   BP: 110/70   Pulse: 85   Resp: 16       LIPIDS - LAST 2   Lab Results   Component Value Date    CHOL 125 12/08/2022    CHOL 122 02/18/2021    HDL 39 (L) 12/08/2022    HDL 34 (L) 02/18/2021    LDLCALC 63.0 12/08/2022    LDLCALC 62.8 (L) 02/18/2021    TRIG 115 12/08/2022    TRIG 126 02/18/2021    CHOLHDL 31.2 12/08/2022    CHOLHDL 27.9 02/18/2021       CBC - LAST 2  Lab Results   Component Value Date    WBC 4.46 02/06/2024    WBC 4.43 07/03/2023    RBC 4.35 (L) 02/06/2024    RBC 4.11 (L) 07/03/2023    HGB 11.2 (L) 02/06/2024    HGB 10.2 (L) 07/03/2023    HCT 36.2 (L) 02/06/2024    HCT 34.0 (L) 07/03/2023    MCV 83 02/06/2024    MCV 83 07/03/2023    MCH 25.7 (L) 02/06/2024    MCH 24.8 (L) 07/03/2023    MCHC 30.9 (L) 02/06/2024    MCHC 30.0 (L) 07/03/2023    RDW 14.9 (H) 02/06/2024    RDW 14.0 07/03/2023     (L) 02/06/2024     (L) 07/03/2023    MPV 11.2 02/06/2024    MPV 10.7 07/03/2023    GRAN 2.8 02/06/2024    GRAN 61.8 02/06/2024    LYMPH 1.3 02/06/2024    LYMPH 28.3 02/06/2024    MONO 0.4 02/06/2024    MONO 8.3 02/06/2024    BASO 0.03 02/06/2024    BASO 0.02 07/03/2023    NRBC 0 02/06/2024    NRBC 0 07/03/2023       CHEMISTRY & LIVER FUNCTION - LAST 2  Lab Results   Component Value Date     02/06/2024     07/03/2023    K 4.6 02/06/2024    K 4.8 07/03/2023     02/06/2024     07/03/2023    CO2 22 (L) 02/06/2024    CO2 25 07/03/2023     "ANIONGAP 10 02/06/2024    ANIONGAP 10 07/03/2023    BUN 26 (H) 02/06/2024    BUN 21 07/03/2023    CREATININE 1.3 02/06/2024    CREATININE 1.2 07/03/2023     02/06/2024     (H) 07/03/2023    CALCIUM 9.4 02/06/2024    CALCIUM 8.9 07/03/2023    MG 1.9 05/22/2023    ALBUMIN 4.2 02/06/2024    ALBUMIN 3.9 07/03/2023    PROT 6.9 06/06/2023    PROT 7.7 12/08/2022    ALKPHOS 99 06/06/2023    ALKPHOS 159 (H) 12/08/2022    ALT 12 06/06/2023    ALT 19 12/08/2022    AST 17 06/06/2023    AST 18 12/08/2022    BILITOT 0.2 06/06/2023    BILITOT 0.4 12/08/2022        CARDIAC PROFILE - LAST 2  No results found for: "BNP", "CPK", "CPKMB", "LDH", "TROPONINI", "TROPONINIHS"     COAGULATION - LAST 2  No results found for: "LABPT", "INR", "APTT"    ENDOCRINE & PSA - LAST 2  Lab Results   Component Value Date    HGBA1C 5.6 06/06/2023    HGBA1C 5.6 12/08/2022    TSH 6.284 (H) 06/06/2023    TSH 5.636 (H) 12/08/2022    PSA 0.65 07/03/2023    PSA 0.86 07/20/2021        ECHOCARDIOGRAM RESULTS  Results for orders placed during the hospital encounter of 12/01/21    Echo    Interpretation Summary  · The left ventricle is normal in size with mild concentric hypertrophy and normal systolic function.  · The estimated ejection fraction is 62%.  · Normal left ventricular diastolic function.  · Normal right ventricular size with normal right ventricular systolic function.  · Mild left atrial enlargement.  · Normal central venous pressure (3 mmHg).  · The estimated PA systolic pressure is 24 mmHg.  · Mild tricuspid regurgitation.      CURRENT/PREVIOUS VISIT EKG  Results for orders placed or performed during the hospital encounter of 02/15/22   EKG 12-LEAD on arrival to floor    Collection Time: 02/15/22  9:57 AM    Narrative    Test Reason : I25.10,    Vent. Rate : 061 BPM     Atrial Rate : 061 BPM     P-R Int : 118 ms          QRS Dur : 086 ms      QT Int : 426 ms       P-R-T Axes : 023 118 094 degrees     QTc Int : 428 ms    Sinus rhythm " with Premature atrial complexes  Lateral infarct ,age undetermined  Abnormal ECG  When compared with ECG of 11-FEB-2022 13:12,  Premature atrial complexes are now Present  Lateral infarct is now Present  Confirmed by Ashutosh MIRZA, Kg PILLAI ([id number]) on 2/20/2022 5:54:22 PM    Referred By:             Confirmed By:Kg Boykin MD     No valid procedures specified.   Results for orders placed during the hospital encounter of 12/01/21    Nuclear Stress - Cardiology Interpreted    Interpretation Summary    Abnormal myocardial perfusion scan.    There are two defects.    Defect #1 - There is a moderate to severe intensity, moderate to large sized, mostly reversible with some fixed areas defect in the mid to apical inferior and inferoseptal wall(s).    Defect #2 - There is a small to moderate sized, moderate intensity, mostly fixed with some reversibilty defect in the basal to apical anterior and anteroapical wall(s) in the typical distribution of the LAD territory.    The gated perfusion images showed an ejection fraction of 57% post stress. Normal ejection fraction is greater than 53%.    There is normal wall motion post stress.    LV cavity size is  and normal at stress.    The EKG portion of this study is negative for ischemia.    The patient reported no chest pain during the stress test.    During stress, occasional PACs are noted.    Recommend further cardiac evaluation.    Summary of the last angiogram 2022       The left ventricular systolic function was normal.  The pre-procedure left ventricular end diastolic pressure was 3.  The post-procedure left ventricular end diastolic pressure was 4.  The ejection fraction was calculated to be 65%.  There was trivial (1+) mitral regurgitation.  The estimated blood loss was <50 mL.  There was non-obstructive coronary artery disease..       PHYSICAL EXAM  CONSTITUTIONAL: Well built, well nourished in no apparent distress  NECK: no carotid bruit, no JVD  LUNGS: CTA  CHEST WALL:  no tenderness  HEART: regular rate and rhythm, S1, S2 normal, no murmur, click, rub or gallop   ABDOMEN: soft, non-tender; bowel sounds normal; no masses,  no organomegaly  EXTREMITIES: Extremities normal, no edema, no calf tenderness noted  NEURO: AAO X 3    I HAVE REVIEWED :    The vital signs, nurses notes, and all the pertinent radiology and labs.        Current Outpatient Medications   Medication Instructions    aspirin (ECOTRIN) 81 mg, Oral, 2 times daily    blood sugar diagnostic Strp Has an Embrace Talk meter- Use daily to check glucose. 99m need. f2f 6/7/22. E11.22    celecoxib (CELEBREX) 200 mg, Oral    diphenhydrAMINE (SOMINEX) 25 mg, Oral, Nightly PRN, Pt takes 1 1/2 tabs for sleep at night    gabapentin (NEURONTIN) 100 mg, Oral, 3 times daily    isosorbide mononitrate (IMDUR) 30 MG 24 hr tablet TAKE ONE TABLET BY MOUTH DAILY    lancets (LANCETS,ULTRA THIN) Misc Use daily to check glucose. 99m need. f2f 6/7/22. E11.22    levothyroxine (SYNTHROID) 25 mcg, Oral, Before breakfast    lisinopriL 10 mg    metFORMIN (GLUCOPHAGE-XR) 500 MG ER 24hr tablet TAKE ONE TABLET BY MOUTH EVERY MORNING, 1 TABLET AT NOON, and 2 TABLETS AT BEDTIME    MS CONTIN 30 mg, Oral, 2 times daily PRN    omeprazole (PRILOSEC) 40 mg, Oral, Daily    oxyCODONE (ROXICODONE) 20 mg Tab immediate release tablet 1 tablet, Oral, 4 times daily PRN    rosuvastatin (CRESTOR) 5 mg, Oral, Daily    tamsulosin (FLOMAX) 0.4 mg Cap 1 capsule, Oral          Assessment & Plan     1. Coronary artery disease of native artery of native heart with stable angina pectoris  Assessment & Plan:  Patient was doing well from angina standpoint.  He was on isosorbide mononitrate because of recurrent episodes of dizziness will stop this medicine as well.  Continue on aspirin therapy and Crestor 5 mg nightly.  Maintain on low-fat low-cholesterol diet      2. Hyperlipidemia associated with type 2 diabetes mellitus  Assessment & Plan:  As below his lipid levels are well  controlled LDL C is 63 non-HDL is 86 continue on Crestor 5 mg nightly maintain low-fat low-cholesterol diet      3. Hypertension associated with diabetes  Assessment & Plan:  Although blood pressure is 110/70 mm Hg today recommend to start the isosorbide mononitrate is continues to have fairly symptomatic he was off lisinopril completely suspected developing progressive autonomic dysfunction.  He is on tamsulosin for urologic symptoms and defer this to urology management.      4. S/P coronary artery stent placement  Assessment & Plan:  Last angiogram shows patent stents and no significant obstructive disease continue on risk factor modification as mentioned above      5. Diabetic nephropathy associated with type 2 diabetes mellitus  Assessment & Plan:  He was developed progressive diabetic neuropathy renal function has been stable.  Continue management with aggressive diabetic control      6. Thyroid dysfunction  Assessment & Plan:  Continue on levothyroxine 25 mcg a day.            Follow up in about 6 months (around 3/24/2025).

## 2024-09-24 NOTE — ASSESSMENT & PLAN NOTE
He was developed progressive diabetic neuropathy renal function has been stable.  Continue management with aggressive diabetic control

## 2024-11-26 DIAGNOSIS — N18.31 TYPE 2 DIABETES MELLITUS WITH STAGE 3A CHRONIC KIDNEY DISEASE, WITHOUT LONG-TERM CURRENT USE OF INSULIN: ICD-10-CM

## 2024-11-26 DIAGNOSIS — E11.22 TYPE 2 DIABETES MELLITUS WITH STAGE 3A CHRONIC KIDNEY DISEASE, WITHOUT LONG-TERM CURRENT USE OF INSULIN: ICD-10-CM

## 2024-11-26 NOTE — TELEPHONE ENCOUNTER
Refill Routing Note   Medication(s) are not appropriate for processing by [facility] for the following reason(s):        Patient not seen by provider within 15 months  Required labs outdated    [facility] action(s):  Defer   Requires appointment : Yes   Requires labs : Yes             Appointments  past 12m or future 3m with PCP    Date Provider   Last Visit   6/6/2023 Candy Linda MD   Next Visit   Visit date not found Candy Linda MD   ED visits in past 90 days: 0        Note composed:12:47 PM 11/26/2024

## 2024-11-26 NOTE — TELEPHONE ENCOUNTER
Care Due:                  Date            Visit Type   Department     Provider  --------------------------------------------------------------------------------                                EP -                              PRIMARY  Last Visit: 06-      CARE ([facility])   None Found     Candy Linda  Next Visit: None Scheduled  None         None Found                                                            Last  Test          Frequency    Reason                     Performed    Due Date  --------------------------------------------------------------------------------    Office Visit  12 months..  celecoxib, levothyroxine,   06- 05-                             metFORMIN, rosuvastatin..    CBC.........  12 months..  celecoxib................  02- 02-    CMP.........  12 months..  celecoxib, metFORMIN,      06- 05-                             rosuvastatin.............    HBA1C.......  6 months...  metFORMIN................  06- 12-    Lipid Panel.  12 months..  rosuvastatin.............  12- 12-    TSH.........  12 months..  levothyroxine............  06- 05-    [facility] Embedded Care Due Messages. Reference number: [id number].   11/26/2024 8:01:17 AM CST

## 2024-11-27 RX ORDER — METFORMIN HYDROCHLORIDE 500 MG/1
TABLET, EXTENDED RELEASE ORAL
Qty: 360 TABLET | Refills: 0 | OUTPATIENT
Start: 2024-11-27

## 2025-02-13 ENCOUNTER — TELEPHONE (OUTPATIENT)
Facility: CLINIC | Age: 70
End: 2025-02-13
Payer: MEDICARE

## 2025-02-13 DIAGNOSIS — K90.9 VITAMIN B12 DEFICIENCY DUE TO INTESTINAL MALABSORPTION: ICD-10-CM

## 2025-02-13 DIAGNOSIS — D50.0 IRON DEFICIENCY ANEMIA DUE TO CHRONIC BLOOD LOSS: Primary | ICD-10-CM

## 2025-02-13 DIAGNOSIS — D63.1 ANEMIA ASSOCIATED WITH STAGE 2 CHRONIC RENAL FAILURE: ICD-10-CM

## 2025-02-13 DIAGNOSIS — D53.9 NUTRITIONAL ANEMIA: ICD-10-CM

## 2025-02-13 DIAGNOSIS — E53.8 VITAMIN B12 DEFICIENCY DUE TO INTESTINAL MALABSORPTION: ICD-10-CM

## 2025-02-13 DIAGNOSIS — N18.2 ANEMIA ASSOCIATED WITH STAGE 2 CHRONIC RENAL FAILURE: ICD-10-CM

## 2025-02-13 NOTE — TELEPHONE ENCOUNTER
Patient would like to have labs done at [facility] instead of . Labs prepared, pending approval.

## 2025-02-14 ENCOUNTER — OFFICE VISIT (OUTPATIENT)
Dept: CARDIOLOGY | Facility: CLINIC | Age: 70
End: 2025-02-14
Payer: MEDICARE

## 2025-02-14 ENCOUNTER — LAB VISIT (OUTPATIENT)
Dept: LAB | Facility: HOSPITAL | Age: 70
End: 2025-02-14
Attending: INTERNAL MEDICINE
Payer: MEDICARE

## 2025-02-14 VITALS
OXYGEN SATURATION: 98 % | HEIGHT: 72 IN | BODY MASS INDEX: 32.46 KG/M2 | DIASTOLIC BLOOD PRESSURE: 83 MMHG | SYSTOLIC BLOOD PRESSURE: 130 MMHG | WEIGHT: 239.63 LBS | HEART RATE: 84 BPM

## 2025-02-14 DIAGNOSIS — D50.9 IRON DEFICIENCY ANEMIA, UNSPECIFIED IRON DEFICIENCY ANEMIA TYPE: ICD-10-CM

## 2025-02-14 DIAGNOSIS — Z95.5 S/P CORONARY ARTERY STENT PLACEMENT: ICD-10-CM

## 2025-02-14 DIAGNOSIS — E53.8 BIOTIN-(PROPIONYL-COA-CARBOXYLASE) LIGASE DEFICIENCY: ICD-10-CM

## 2025-02-14 DIAGNOSIS — I15.2 HYPERTENSION ASSOCIATED WITH DIABETES: ICD-10-CM

## 2025-02-14 DIAGNOSIS — D50.0 IRON DEFICIENCY ANEMIA SECONDARY TO BLOOD LOSS (CHRONIC): Primary | ICD-10-CM

## 2025-02-14 DIAGNOSIS — E11.59 HYPERTENSION ASSOCIATED WITH DIABETES: ICD-10-CM

## 2025-02-14 DIAGNOSIS — E11.69 HYPERLIPIDEMIA ASSOCIATED WITH TYPE 2 DIABETES MELLITUS: Primary | ICD-10-CM

## 2025-02-14 DIAGNOSIS — E07.9 THYROID DYSFUNCTION: ICD-10-CM

## 2025-02-14 DIAGNOSIS — I25.118 CORONARY ARTERY DISEASE OF NATIVE ARTERY OF NATIVE HEART WITH STABLE ANGINA PECTORIS: ICD-10-CM

## 2025-02-14 DIAGNOSIS — D63.1 ANEMIA OF CHRONIC RENAL FAILURE: ICD-10-CM

## 2025-02-14 DIAGNOSIS — N18.9 ANEMIA OF CHRONIC RENAL FAILURE: ICD-10-CM

## 2025-02-14 DIAGNOSIS — E78.5 HYPERLIPIDEMIA ASSOCIATED WITH TYPE 2 DIABETES MELLITUS: Primary | ICD-10-CM

## 2025-02-14 DIAGNOSIS — Z72.0 TOBACCO USE: ICD-10-CM

## 2025-02-14 DIAGNOSIS — E78.2 MIXED HYPERLIPIDEMIA: ICD-10-CM

## 2025-02-14 DIAGNOSIS — E11.9 TYPE 2 DIABETES MELLITUS WITHOUT COMPLICATION, WITHOUT LONG-TERM CURRENT USE OF INSULIN: ICD-10-CM

## 2025-02-14 DIAGNOSIS — N18.2 CHRONIC KIDNEY DISEASE, STAGE II (MILD): ICD-10-CM

## 2025-02-14 DIAGNOSIS — K90.9 IDIOPATHIC STEATORRHEA: ICD-10-CM

## 2025-02-14 LAB
BASOPHILS # BLD AUTO: 0.04 K/UL (ref 0–0.2)
BASOPHILS NFR BLD: 1 % (ref 0–1.9)
DIFFERENTIAL METHOD BLD: ABNORMAL
EOSINOPHIL # BLD AUTO: 0 K/UL (ref 0–0.5)
EOSINOPHIL NFR BLD: 0.7 % (ref 0–8)
ERYTHROCYTE [DISTWIDTH] IN BLOOD BY AUTOMATED COUNT: 13.3 % (ref 11.5–14.5)
FERRITIN SERPL-MCNC: 53.3 NG/ML (ref 20–300)
HCT VFR BLD AUTO: 38 % (ref 40–54)
HGB BLD-MCNC: 12.4 G/DL (ref 14–18)
IMM GRANULOCYTES # BLD AUTO: 0.01 K/UL (ref 0–0.04)
IMM GRANULOCYTES NFR BLD AUTO: 0.2 % (ref 0–0.5)
LYMPHOCYTES # BLD AUTO: 1.3 K/UL (ref 1–4.8)
LYMPHOCYTES NFR BLD: 30.7 % (ref 18–48)
MCH RBC QN AUTO: 30.5 PG (ref 27–31)
MCHC RBC AUTO-ENTMCNC: 32.6 G/DL (ref 32–36)
MCV RBC AUTO: 94 FL (ref 82–98)
MONOCYTES # BLD AUTO: 0.3 K/UL (ref 0.3–1)
MONOCYTES NFR BLD: 6.4 % (ref 4–15)
NEUTROPHILS # BLD AUTO: 2.5 K/UL (ref 1.8–7.7)
NEUTROPHILS NFR BLD: 61 % (ref 38–73)
NRBC BLD-RTO: 0 /100 WBC
OHS QRS DURATION: 82 MS
OHS QTC CALCULATION: 420 MS
PLATELET # BLD AUTO: 114 K/UL (ref 150–450)
PMV BLD AUTO: 10.2 FL (ref 9.2–12.9)
RBC # BLD AUTO: 4.06 M/UL (ref 4.6–6.2)
VIT B12 SERPL-MCNC: 437 PG/ML (ref 210–950)
WBC # BLD AUTO: 4.07 K/UL (ref 3.9–12.7)

## 2025-02-14 PROCEDURE — 99999 PR PBB SHADOW E&M-EST. PATIENT-LVL III: CPT | Mod: PBBFAC,,,

## 2025-02-14 PROCEDURE — 85025 COMPLETE CBC W/AUTO DIFF WBC: CPT | Performed by: INTERNAL MEDICINE

## 2025-02-14 PROCEDURE — 99213 OFFICE O/P EST LOW 20 MIN: CPT | Mod: PBBFAC,PN

## 2025-02-14 PROCEDURE — 36415 COLL VENOUS BLD VENIPUNCTURE: CPT | Performed by: INTERNAL MEDICINE

## 2025-02-14 PROCEDURE — 82607 VITAMIN B-12: CPT | Performed by: INTERNAL MEDICINE

## 2025-02-14 PROCEDURE — 82728 ASSAY OF FERRITIN: CPT | Performed by: INTERNAL MEDICINE

## 2025-02-14 RX ORDER — ISOSORBIDE MONONITRATE 30 MG/1
30 TABLET, EXTENDED RELEASE ORAL DAILY
COMMUNITY
End: 2025-02-14 | Stop reason: SDUPTHER

## 2025-02-14 RX ORDER — CHOLECALCIFEROL (VITAMIN D3) 25 MCG
1000 TABLET ORAL DAILY
COMMUNITY
Start: 2024-10-28 | End: 2025-10-28

## 2025-02-14 RX ORDER — ISOSORBIDE MONONITRATE 30 MG/1
30 TABLET, EXTENDED RELEASE ORAL DAILY
Qty: 90 TABLET | Refills: 3 | Status: SHIPPED | OUTPATIENT
Start: 2025-02-14

## 2025-02-14 RX ORDER — LISINOPRIL 5 MG/1
2.5 TABLET ORAL DAILY PRN
Qty: 45 TABLET | Refills: 3 | Status: SHIPPED | OUTPATIENT
Start: 2025-02-14 | End: 2026-02-14

## 2025-02-14 RX ORDER — ROSUVASTATIN CALCIUM 10 MG/1
10 TABLET, COATED ORAL DAILY
Qty: 90 TABLET | Refills: 3 | Status: SHIPPED | OUTPATIENT
Start: 2025-02-14

## 2025-02-14 NOTE — PROGRESS NOTES
Subjective:    Patient ID:  Lei Hsu is a 69 y.o. male who presents for follow-up.      HPI:  Lei Hsu is here for follow-up visit with a hx of CAD (nonobstructive disease, patent stents in ) and T2DM.     Recently noticed some elevated BP (100-10s/70-80s, but a few times it has been higher to the 150-160s when he is out working) with associated headache with mild lightheadedness. He has been taking an extra Imdur on those days. Reports occasional shortness of breath, admits to being a former a smoker and occasionally vapes note.    Denies chest pain or palpitations. Denies recent fever cough chills or congestion. Denies blood in the urine or blood in the stool.  Denies myalgias. Denies orthopnea or peripheral edema. Denies nausea vomiting or dyspepsia. No recent arm neck or jaw pain. No dizziness.       Review of patient's allergies indicates:  No Known Allergies    Past Medical History:   Diagnosis Date    Atherosclerosis     CAD (coronary artery disease) 2021    Cardiac murmur     DM (diabetes mellitus) 2021    Hyperlipidemia     Hypertension     Personal history of colonic polyps     Tobacco abuse      Past Surgical History:   Procedure Laterality Date    BACK SURGERY      L3-L4    CORONARY ANGIOGRAPHY N/A 2/15/2022    Procedure: ANGIOGRAM, CORONARY ARTERY;  Surgeon: Howard Burton MD;  Location: [facility] CATH/EP LAB;  Service: Cardiology;  Laterality: N/A;    KNEE ARTHROSCOPY      KNEE SURGERY      LEFT HEART CATHETERIZATION Left 2/15/2022    Procedure: CATHETERIZATION, HEART, LEFT;  Surgeon: Howard Burton MD;  Location: [facility] CATH/EP LAB;  Service: Cardiology;  Laterality: Left;     Social History     Tobacco Use    Smoking status: Former     Current packs/day: 0.00     Average packs/day: 1 pack/day for 40.0 years (40.0 ttl pk-yrs)     Types: Vaping with nicotine, Cigarettes     Start date:      Quit date: 2018     Years since quittin.1    Smokeless tobacco: Never    Substance Use Topics    Alcohol use: No    Drug use: No     No family history on file.     Review of Systems:   See HPI       Objective:        Vitals:    02/14/25 1109   BP: 130/83   Pulse: 84       Lab Results   Component Value Date    WBC 4.07 02/14/2025    HGB 12.4 (L) 02/14/2025    HCT 38.0 (L) 02/14/2025     (L) 02/14/2025    CHOL 125 12/08/2022    TRIG 115 12/08/2022    HDL 39 (L) 12/08/2022    ALT 12 06/06/2023    AST 17 06/06/2023     02/06/2024    K 4.6 02/06/2024     02/06/2024    CREATININE 1.3 02/06/2024    BUN 26 (H) 02/06/2024    CO2 22 (L) 02/06/2024    TSH 6.284 (H) 06/06/2023    PSA 0.65 07/03/2023    HGBA1C 5.6 06/06/2023        ECHOCARDIOGRAM RESULTS  Results for orders placed during the hospital encounter of 12/01/21    Echo    Interpretation Summary  · The left ventricle is normal in size with mild concentric hypertrophy and normal systolic function.  · The estimated ejection fraction is 62%.  · Normal left ventricular diastolic function.  · Normal right ventricular size with normal right ventricular systolic function.  · Mild left atrial enlargement.  · Normal central venous pressure (3 mmHg).  · The estimated PA systolic pressure is 24 mmHg.  · Mild tricuspid regurgitation.        CURRENT/PREVIOUS VISIT EKG  Results for orders placed or performed during the hospital encounter of 02/15/22   EKG 12-LEAD on arrival to floor    Collection Time: 02/15/22  9:57 AM    Narrative    Test Reason : I25.10,    Vent. Rate : 061 BPM     Atrial Rate : 061 BPM     P-R Int : 118 ms          QRS Dur : 086 ms      QT Int : 426 ms       P-R-T Axes : 023 118 094 degrees     QTc Int : 428 ms    Sinus rhythm with Premature atrial complexes  Lateral infarct ,age undetermined  Abnormal ECG  When compared with ECG of 11-FEB-2022 13:12,  Premature atrial complexes are now Present  Lateral infarct is now Present  Confirmed by Ashutosh MIRZA, Kg PILLAI ([id number]) on 2/20/2022 5:54:22 PM    Referred By:              Confirmed By:Kg Boykin MD     No valid procedures specified.   Results for orders placed during the hospital encounter of 12/01/21    Nuclear Stress - Cardiology Interpreted    Interpretation Summary    Abnormal myocardial perfusion scan.    There are two defects.    Defect #1 - There is a moderate to severe intensity, moderate to large sized, mostly reversible with some fixed areas defect in the mid to apical inferior and inferoseptal wall(s).    Defect #2 - There is a small to moderate sized, moderate intensity, mostly fixed with some reversibilty defect in the basal to apical anterior and anteroapical wall(s) in the typical distribution of the LAD territory.    The gated perfusion images showed an ejection fraction of 57% post stress. Normal ejection fraction is greater than 53%.    There is normal wall motion post stress.    LV cavity size is  and normal at stress.    The EKG portion of this study is negative for ischemia.    The patient reported no chest pain during the stress test.    During stress, occasional PACs are noted.    Recommend further cardiac evaluation.    Summary of the last angiogram 2022     The left ventricular systolic function was normal.  The pre-procedure left ventricular end diastolic pressure was 3.  The post-procedure left ventricular end diastolic pressure was 4.  The ejection fraction was calculated to be 65%.  There was trivial (1+) mitral regurgitation.  The estimated blood loss was <50 mL.  There was non-obstructive coronary artery disease.      Physical Exam:  CONSTITUTIONAL: No fever, no chills  HEENT: Normocephalic, atraumatic, pupils reactive to light                 NECK:  No JVD, no carotid bruit  CVS: S1S2+, RRR, no murmurs   LUNGS: Clear  ABDOMEN: Soft, NT, BS+. No bruit  EXTREMITIES: No cyanosis, edema. Pulses intact  : No russo catheter  NEURO: AAO X 3  PSY: Normal affect      Medication List with Changes/Refills   New Medications    LISINOPRIL  (PRINIVIL,ZESTRIL) 5 MG TABLET    Take 0.5 tablets (2.5 mg total) by mouth daily as needed (If SBP >140).   Current Medications    ASPIRIN (ECOTRIN) 81 MG EC TABLET    Take 81 mg by mouth 2 (two) times a day.    BLOOD SUGAR DIAGNOSTIC STRP    Has an Embrace Talk meter- Use daily to check glucose. 99m need. f2f 6/7/22. E11.22    CELECOXIB (CELEBREX) 200 MG CAPSULE    TAKE ONE CAPSULE BY MOUTH ONCE DAILY    DIPHENHYDRAMINE (SOMINEX) 25 MG TABLET    Take 25 mg by mouth nightly as needed for Insomnia. Pt takes 1 1/2 tabs for sleep at night    GABAPENTIN (NEURONTIN) 100 MG CAPSULE    Take 400 mg by mouth 2 (two) times daily. 2 tab PO (200 mg) BID daily    LANCETS (LANCETS,ULTRA THIN) MISC    Use daily to check glucose. 99m need. f2f 6/7/22. E11.22    LEVOTHYROXINE (SYNTHROID) 25 MCG TABLET    TAKE ONE TABLET BY MOUTH DAILY BEFORE BREAKFAST    METFORMIN (GLUCOPHAGE-XR) 500 MG ER 24HR TABLET    TAKE ONE TABLET BY MOUTH EVERY MORNING, 1 TABLET AT NOON, and 2 TABLETS AT BEDTIME    MS CONTIN 30 MG 12 HR TABLET    Take 30 mg by mouth 2 (two) times daily as needed.    OMEPRAZOLE (PRILOSEC) 40 MG CAPSULE    Take 1 capsule (40 mg total) by mouth Daily.    OXYCODONE (ROXICODONE) 20 MG TAB IMMEDIATE RELEASE TABLET    Take 1 tablet by mouth 4 (four) times daily as needed.    TAMSULOSIN (FLOMAX) 0.4 MG CAP    Take 1 capsule by mouth.    VITAMIN D (VITAMIN D3) 1000 UNITS TAB    Take 1,000 Units by mouth once daily.   Changed and/or Refilled Medications    Modified Medication Previous Medication    ISOSORBIDE MONONITRATE (IMDUR) 30 MG 24 HR TABLET isosorbide mononitrate (IMDUR) 30 MG 24 hr tablet       Take 1 tablet (30 mg total) by mouth once daily.    Take 30 mg by mouth once daily.    ROSUVASTATIN (CRESTOR) 10 MG TABLET rosuvastatin (CRESTOR) 5 MG tablet       Take 1 tablet (10 mg total) by mouth once daily.    Take 1 tablet (5 mg total) by mouth once daily.             Assessment:       1. Hyperlipidemia associated with type 2  diabetes mellitus    2. S/P coronary artery stent placement    3. Coronary artery disease of native artery of native heart with stable angina pectoris    4. Iron deficiency anemia, unspecified iron deficiency anemia type    5. Type 2 diabetes mellitus without complication, without long-term current use of insulin    6. Thyroid dysfunction    7. Mixed hyperlipidemia    8. Hypertension associated with diabetes    9. Tobacco use         Plan:     Problem List Items Addressed This Visit          Cardiac/Vascular    Hypertension associated with diabetes    Current Assessment & Plan     BP stable in office.  Per patient's home BP, will add lisinopril 2.5 mg as needed for SBP >140 mmHg after his other antihypertensive has had sufficient time to work.  Continue Imdur 30 mg daily.  Low sodium diet.         Relevant Medications    isosorbide mononitrate (IMDUR) 30 MG 24 hr tablet    lisinopriL (PRINIVIL,ZESTRIL) 5 MG tablet    Hyperlipidemia associated with type 2 diabetes mellitus - Primary    Current Assessment & Plan       Increase Crestor to 10 mg  Low fat diet and exercise encouraged         Relevant Medications    rosuvastatin (CRESTOR) 10 MG tablet    Coronary artery disease of native artery of native heart with stable angina pectoris    S/P coronary artery stent placement    Overview     [facility] 2022  Left Main   The vessel was visualized by angiography, is moderate in size and is angiographically normal.      Left Anterior Descending   The vessel was visualized by angiography, is moderate in size and is angiographically normal. The vessel is moderately tortuous.      First Diagonal Branch   1st Diag lesion is 20% stenosed. The lesion was < 10 mm long. The lesion shape was concentric.      Left Circumflex   The vessel was visualized by angiography, is moderate in size and is angiographically normal. The vessel is moderately tortuous.      Right Coronary Artery   The vessel was visualized by angiography, is moderate in  size and is angiographically normal. The vessel is severely tortuous.      Right Posterior Descending Artery   The vessel is small. There is disease in the middle portion of the vessel. The vessel is tortuous.   RPDA lesion is 20% stenosed.               Current Assessment & Plan     Continue statin and aspirin.  Heart healthy diet encouraged.         Relevant Orders    IN OFFICE EKG 12-LEAD (to [place])       Oncology    Iron deficiency anemia, unspecified    Current Assessment & Plan     H/H stable.   Managed by PCP.            Endocrine    Type 2 diabetes mellitus without complication, without long-term current use of insulin    Current Assessment & Plan     Continue current regimen. Managed by PCP         Thyroid dysfunction    Current Assessment & Plan     Continue Synthroid  Managed by PCP            Other    Tobacco use    Current Assessment & Plan     Cessation encouraged.          Other Visit Diagnoses       Mixed hyperlipidemia                Follow up in about 6 months (around 8/14/2025).      Mine Morales, [facility]  02/14/2025

## 2025-02-14 NOTE — ASSESSMENT & PLAN NOTE
BP stable in office.  Per patient's home BP, will add lisinopril 2.5 mg as needed for SBP >140 mmHg after his other antihypertensive has had sufficient time to work.  Continue Imdur 30 mg daily.  Low sodium diet.

## 2025-02-20 ENCOUNTER — OFFICE VISIT (OUTPATIENT)
Facility: CLINIC | Age: 70
End: 2025-02-20
Payer: MEDICARE

## 2025-02-20 VITALS
SYSTOLIC BLOOD PRESSURE: 132 MMHG | RESPIRATION RATE: 16 BRPM | BODY MASS INDEX: 32.37 KG/M2 | HEART RATE: 91 BPM | TEMPERATURE: 97 F | WEIGHT: 239 LBS | DIASTOLIC BLOOD PRESSURE: 72 MMHG | OXYGEN SATURATION: 97 % | HEIGHT: 72 IN

## 2025-02-20 DIAGNOSIS — D51.8 DIETARY VITAMIN B12 DEFICIENCY ANEMIA: ICD-10-CM

## 2025-02-20 DIAGNOSIS — D50.0 IRON DEFICIENCY ANEMIA DUE TO CHRONIC BLOOD LOSS: Primary | ICD-10-CM

## 2025-02-20 PROCEDURE — 99215 OFFICE O/P EST HI 40 MIN: CPT | Mod: PBBFAC,PN | Performed by: INTERNAL MEDICINE

## 2025-02-20 NOTE — PROGRESS NOTES
[facility] [address] Hematology Oncology In Office Subsequent Encounter Note    2/20/24    Subjective:      Patient ID:   Lei Hsu  69 y.o. male  1955  NENITA Trinidad MD      Chief Complaint:   anemia    HPI:  69 y.o. male referred for evaluation of anemia.  He admits to easy fatigue but denies shortness of breath with exertion.  Hemoglobin was 10.5, ferritin was 10, he has been on a multivitamin for 3 or 4 months.  Previous EGD and colonoscopy lip was less than 1 year ago.  Stools for occult blood are negative x3 now.  Injectafer x2 has been given.  Hemoglobin has gone from 12.3-12.6 and his ferritin level is up to 126 with an erythropoietin level of 12.8.  Previous endoscopy from August 29, 2023 showed gastritis and a small polyp was removed with the colonoscopy.  B12 is stable at 365.    He completed injectaferr infusion x2 through [facility].  Energy level is improved at a 6/10.  Hemoglobin has improved from 10.5-12.3 at this time.  Reviewed with him and gave him the supplies to check his stools for occult blood.  He will check this weekly x3 weeks and turn in the packet to us here.  I have asked him to get a repeat CBC and ferritin towards the end of July and follow-up here in early August.  Stool for heme was negative.    Current CBC, Ferritin are as above.    He has peripheral arterial disease, hypertension, hyperlipidemia, type 2 diabetes, coronary artery disease without angina, history of thrombocytopenia, obesity, and osteoarthritis of multiple joints.  He has history of chronic renal insufficiency.    He is status post coronary artery stent placement, status post right knee replacement.  He is wearing a wrap at the left knee, he aggravated his knee condition while placing a generator in his shed.  He may need knee replacement eventually?    He has a 40 pack year history of cigarette smoking but quit in 2018.  One pack per day for 40 years.  He does not have a  history of alcohol in the past.  He does not have history of allergies to medications.    Medications include Ecotrin, Celebrex, Benadryl, Imdur, Synthroid, Glucophage, Prilosec, oxycodone, Crestor, and MS Contin.    ROS:   GEN: normal without any fever, night sweats or weight loss  HEENT: normal with no HA's, sore throat, stiff neck, changes in vision  CV: See HPI  PULM: normal with no SOB, cough, hemoptysis, sputum or pleuritic pain  GI: normal with no abdominal pain, nausea, vomiting, constipation, diarrhea, melanotic stools, BRBPR, or hematemesis  : renal stones, CRI.  BREAST: normal with no mass, discharge, pain  SKIN: normal with no rash, erythema, bruising, or swelling     Past Medical History:   Diagnosis Date    Atherosclerosis     CAD (coronary artery disease) 2021    Cardiac murmur     DM (diabetes mellitus) 2021    Hyperlipidemia     Hypertension     Personal history of colonic polyps     Tobacco abuse      Past Surgical History:   Procedure Laterality Date    BACK SURGERY      L3-L4    CORONARY ANGIOGRAPHY N/A 2/15/2022    Procedure: ANGIOGRAM, CORONARY ARTERY;  Surgeon: Howard Burton MD;  Location: [facility] CATH/EP LAB;  Service: Cardiology;  Laterality: N/A;    KNEE ARTHROSCOPY      KNEE SURGERY      LEFT HEART CATHETERIZATION Left 2/15/2022    Procedure: CATHETERIZATION, HEART, LEFT;  Surgeon: Howard Burton MD;  Location: [facility] CATH/EP LAB;  Service: Cardiology;  Laterality: Left;       Review of patient's allergies indicates:  No Known Allergies  Social History     Socioeconomic History    Marital status:    Tobacco Use    Smoking status: Former     Current packs/day: 0.00     Average packs/day: 1 pack/day for 40.0 years (40.0 ttl pk-yrs)     Types: Vaping with nicotine, Cigarettes     Start date:      Quit date: 2018     Years since quittin.1    Smokeless tobacco: Never   Substance and Sexual Activity    Alcohol use: No    Drug use: No    Sexual activity: Yes      Partners: Female     Social Drivers of Health     Financial Resource Strain: Low Risk  (12/19/2023)    Received from [facility]    Overall Financial Resource Strain (CARDIA)     Difficulty of Paying Living Expenses: Not hard at all   Food Insecurity: No Food Insecurity (12/19/2023)    Received from [facility]    Hunger Vital Sign     Worried About Running Out of Food in the Last Year: Never true     Ran Out of Food in the Last Year: Never true   Transportation Needs: No Transportation Needs (12/19/2023)    Received from [facility]    PRAPARE - Transportation     Lack of Transportation (Medical): No     Lack of Transportation (Non-Medical): No   Physical Activity: Inactive (12/19/2023)    Received from [facility]    Exercise Vital Sign     Days of Exercise per Week: 0 days     Minutes of Exercise per Session: 0 min   Stress: No Stress Concern Present (12/19/2023)    Received from [facility]    [employer] [employer] - Occupational Stress Questionnaire     Feeling of Stress : Not at all   Housing Stability: Low Risk  (12/19/2023)    Received from [facility]    Housing Stability Vital Sign     Unable to Pay for Housing in the Last Year: No     Number of Places Lived in the Last Year: 1     Unstable Housing in the Last Year: No         Current Outpatient Medications:     aspirin (ECOTRIN) 81 MG EC tablet, Take 81 mg by mouth 2 (two) times a day., Disp: , Rfl:     blood sugar diagnostic Strp, Has an Em[employer] Talk meter- Use daily to check glucose. 99m need. f2f 6/7/22. E11.22, Disp: 100 strip, Rfl: 3    celecoxib (CELEBREX) 200 MG capsule, TAKE ONE CAPSULE BY MOUTH ONCE DAILY, Disp: 90 capsule, Rfl: 0    diphenhydrAMINE (SOMINEX) 25 mg tablet, Take 25 mg by mouth nightly as needed for  Insomnia. Pt takes 1 1/2 tabs for sleep at night, Disp: , Rfl:     gabapentin (NEURONTIN) 100 MG capsule, Take 400 mg by mouth 2 (two) times daily. 2 tab PO (200 mg) BID daily, Disp: , Rfl:     isosorbide mononitrate (IMDUR) 30 MG 24 hr tablet, Take 1 tablet (30 mg total) by mouth once daily., Disp: 90 tablet, Rfl: 3    lancets (LANCETS,ULTRA THIN) Misc, Use daily to check glucose. 99m need. f2f 6/7/22. E11.22, Disp: 100 each, Rfl: 3    levothyroxine (SYNTHROID) 25 MCG tablet, TAKE ONE TABLET BY MOUTH DAILY BEFORE BREAKFAST, Disp: 90 tablet, Rfl: 0    lisinopriL (PRINIVIL,ZESTRIL) 5 MG tablet, Take 0.5 tablets (2.5 mg total) by mouth daily as needed (If SBP >140)., Disp: 45 tablet, Rfl: 3    metFORMIN (GLUCOPHAGE-XR) 500 MG ER 24hr tablet, TAKE ONE TABLET BY MOUTH EVERY MORNING, 1 TABLET AT NOON, and 2 TABLETS AT BEDTIME, Disp: 360 tablet, Rfl: 0    MS CONTIN 30 mg 12 hr tablet, Take 30 mg by mouth 2 (two) times daily as needed., Disp: , Rfl:     omeprazole (PRILOSEC) 40 MG capsule, Take 1 capsule (40 mg total) by mouth Daily., Disp: 90 capsule, Rfl: 3    oxyCODONE (ROXICODONE) 20 mg Tab immediate release tablet, Take 1 tablet by mouth 4 (four) times daily as needed., Disp: , Rfl:     rosuvastatin (CRESTOR) 10 MG tablet, Take 1 tablet (10 mg total) by mouth once daily., Disp: 90 tablet, Rfl: 3    tamsulosin (FLOMAX) 0.4 mg Cap, Take 1 capsule by mouth., Disp: , Rfl:     vitamin D (VITAMIN D3) 1000 units Tab, Take 1,000 Units by mouth once daily., Disp: , Rfl:           Objective:   Vitals:  Blood pressure 132/72, pulse 91, temperature 97.2 °F (36.2 °C), temperature source Temporal, resp. rate 16, height 6' (1.829 m), weight 108.4 kg (239 lb), SpO2 97%.    Physical Examination:   GEN: no apparent distress, comfortable  HEAD: atraumatic and normocephalic  EYES: + pallor, no icterus  ENT: no pharyngeal erythema, external ears WNL; no nasal discharge  NECK: no masses, thyroid normal, trachea midline, no LAD/LN's,  supple  CV: RRR with no murmur; normal pulse  CHEST: Normal respiratory effort; CTAB; normal breath sounds; no wheeze or crackles  ABDOM: nontender and nondistended; soft,  no rebound/guarding, L/S NP  MUSC/Skeletal: ROM normal; no crepitus; joints normal  EXTREM: no clubbing, cyanosis, inflammation or swelling  SKIN: no rashes, lesions, ulcers, petechiae  : no cvat  NEURO: grossly intact; motor/sensory WNL;  no tremors  PSYCH: normal mood, affect and behavior  LYMPH: normal cervical, supraclavicular, axillary LN's    Current lab shows HGB 12.4 and Ferritin 53.   B 12 437.    Assessment:   (1) 69 y.o. male with diagnosis of Fe deficiency anemia.      (2)Stool heme negative now.  EGD gastritis, Colonoscopy polyp removed.  8/29/23.    (3)S/P Injectafer x's 2 infusions.  Given.    (4)Hgb better at 12.4, Ferritin now at 53., EPO 12.8.    RTC 3 months with lab.  CBC, B 12, Ferritin.  May come to Fe infusion trial?    Leroy Steward MD  [facility] Onc.  2/20/25

## 2025-05-20 ENCOUNTER — OFFICE VISIT (OUTPATIENT)
Facility: CLINIC | Age: 70
End: 2025-05-20
Payer: MEDICARE

## 2025-05-20 VITALS
WEIGHT: 230.88 LBS | DIASTOLIC BLOOD PRESSURE: 80 MMHG | BODY MASS INDEX: 31.27 KG/M2 | OXYGEN SATURATION: 97 % | HEIGHT: 72 IN | RESPIRATION RATE: 18 BRPM | SYSTOLIC BLOOD PRESSURE: 130 MMHG | HEART RATE: 67 BPM | TEMPERATURE: 98 F

## 2025-05-20 DIAGNOSIS — E66.9 OBESITY WITH BODY MASS INDEX 30 OR GREATER: ICD-10-CM

## 2025-05-20 DIAGNOSIS — D50.9 IRON DEFICIENCY ANEMIA, UNSPECIFIED IRON DEFICIENCY ANEMIA TYPE: Primary | ICD-10-CM

## 2025-05-20 DIAGNOSIS — D51.8 DIETARY VITAMIN B12 DEFICIENCY ANEMIA: ICD-10-CM

## 2025-05-20 PROCEDURE — 99999 PR PBB SHADOW E&M-EST. PATIENT-LVL IV: CPT | Mod: PBBFAC,,, | Performed by: INTERNAL MEDICINE

## 2025-05-20 PROCEDURE — G2211 COMPLEX E/M VISIT ADD ON: HCPCS | Mod: S$PBB,,, | Performed by: INTERNAL MEDICINE

## 2025-05-20 PROCEDURE — 99214 OFFICE O/P EST MOD 30 MIN: CPT | Mod: S$PBB,,, | Performed by: INTERNAL MEDICINE

## 2025-05-20 PROCEDURE — 99214 OFFICE O/P EST MOD 30 MIN: CPT | Mod: PBBFAC,PN | Performed by: INTERNAL MEDICINE

## 2025-05-20 NOTE — PROGRESS NOTES
[facility] [address] Hematology Oncology In Office Subsequent Encounter Note    5/20/25    Subjective:      Patient ID:   Lei Hsu  70 y.o. male  1955  NENITA Trinidad MD      Chief Complaint:   anemia    HPI:  He follows up with myself today for his iron deficiency anemia.  He is status post iron infusions in the past.  Hemoglobin is stable at 12.4 and his ferritin level has gone from 53 up to 58.  This will be monitored for now.  B12 level returned normal at 437.    TSH returned high at 5.77, NENITA Desir has made adjustments in his Synthroid dosage.    He does have morbid obesity.  He continues on his weight loss efforts.  He did weigh 290 lb and now weighs a proximally 230.  He cut down on his intake of food and calories to get down and lose a proximally 60 lb.  He says he will go further on this.    70 y.o. male referred for evaluation of anemia.  He admits to easy fatigue but denies shortness of breath with exertion.  Hemoglobin was 10.5, ferritin was 10, he has been on a multivitamin for 3 or 4 months.  Previous EGD and colonoscopy lip was less than 1 year ago.  Stools for occult blood are negative x3 now.  Injectafer x2 has been given.  Hemoglobin has gone from 12.3-12.6 and his ferritin level is up to 126 with an erythropoietin level of 12.8.  Previous endoscopy from August 29, 2023 showed gastritis and a small polyp was removed with the colonoscopy.  B12 is stable at 365.    He completed injectaferr infusion x2 through [facility].  Energy level is improved at a 6/10.  Hemoglobin has improved from 10.5-12.3 at this time.  Reviewed with him and gave him the supplies to check his stools for occult blood.  He will check this weekly x3 weeks and turn in the packet to us here.  I have asked him to get a repeat CBC and ferritin towards the end of July and follow-up here in early August.  Stool for heme was negative.    Current CBC, Ferritin are as above.    He has peripheral  arterial disease, hypertension, hyperlipidemia, type 2 diabetes, coronary artery disease without angina, history of thrombocytopenia, obesity, and osteoarthritis of multiple joints.  He has history of chronic renal insufficiency.    He is status post coronary artery stent placement, status post right knee replacement.  He is wearing a wrap at the left knee, he aggravated his knee condition while placing a generator in his shed.  He may need knee replacement eventually?    He has a 40 pack year history of cigarette smoking but quit in 2018.  One pack per day for 40 years.  He does not have a history of alcohol in the past.  He does not have history of allergies to medications.    Medications include Ecotrin, Celebrex, Benadryl, Imdur, Synthroid, Glucophage, Prilosec, oxycodone, Crestor, and MS Contin.    ROS:   GEN: normal without any fever, night sweats or weight loss  HEENT: normal with no HA's, sore throat, stiff neck, changes in vision  CV: See HPI  PULM: normal with no SOB, cough, hemoptysis, sputum or pleuritic pain  GI: normal with no abdominal pain, nausea, vomiting, constipation, diarrhea, melanotic stools, BRBPR, or hematemesis  : renal stones, CRI.  BREAST: normal with no mass, discharge, pain  SKIN: normal with no rash, erythema, bruising, or swelling     Past Medical History:   Diagnosis Date    Atherosclerosis     CAD (coronary artery disease) 01/26/2021    Cardiac murmur     DM (diabetes mellitus) 01/26/2021    Hyperlipidemia     Hypertension     Personal history of colonic polyps     Tobacco abuse      Past Surgical History:   Procedure Laterality Date    BACK SURGERY      L3-L4    CORONARY ANGIOGRAPHY N/A 2/15/2022    Procedure: ANGIOGRAM, CORONARY ARTERY;  Surgeon: Howard Burton MD;  Location: [facility] CATH/EP LAB;  Service: Cardiology;  Laterality: N/A;    KNEE ARTHROSCOPY      KNEE SURGERY      LEFT HEART CATHETERIZATION Left 2/15/2022    Procedure: CATHETERIZATION, HEART, LEFT;  Surgeon: Howard  MD Josephine;  Location: [facility] CATH/EP LAB;  Service: Cardiology;  Laterality: Left;       Review of patient's allergies indicates:  No Known Allergies  Social History     Socioeconomic History    Marital status:    Tobacco Use    Smoking status: Former     Current packs/day: 0.00     Average packs/day: 1 pack/day for 40.0 years (40.0 ttl pk-yrs)     Types: Vaping with nicotine, Cigarettes     Start date:      Quit date: 2018     Years since quittin.3    Smokeless tobacco: Never   Substance and Sexual Activity    Alcohol use: No    Drug use: No    Sexual activity: Yes     Partners: Female     Social Drivers of Health     Financial Resource Strain: Low Risk  (2023)    Received from [facility]    Overall Financial Resource Strain (CARDIA)     Difficulty of Paying Living Expenses: Not hard at all   Food Insecurity: No Food Insecurity (2023)    Received from [facility]    Hunger Vital Sign     Worried About Running Out of Food in the Last Year: Never true     Ran Out of Food in the Last Year: Never true   Transportation Needs: No Transportation Needs (2023)    Received from [facility]    PRAPARE - Transportation     Lack of Transportation (Medical): No     Lack of Transportation (Non-Medical): No   Physical Activity: Inactive (2023)    Received from [facility]    Exercise Vital Sign     Days of Exercise per Week: 0 days     Minutes of Exercise per Session: 0 min   Stress: No Stress Concern Present (2023)    Received from [facility]    [place] [place] [employer] - Occupational Stress Questionnaire     Feeling of Stress : Not at all   Housing Stability: Low Risk  (2023)    Received from [facility]    Housing Stability Vital Sign     Unable to  Pay for Housing in the Last Year: No     Number of Places Lived in the Last Year: 1     Unstable Housing in the Last Year: No         Current Outpatient Medications:     aspirin (ECOTRIN) 81 MG EC tablet, Take 81 mg by mouth 2 (two) times a day., Disp: , Rfl:     blood sugar diagnostic Strp, Has an [employer] Talk meter- Use daily to check glucose. 99m need. f2f 6/7/22. E11.22, Disp: 100 strip, Rfl: 3    celecoxib (CELEBREX) 200 MG capsule, TAKE ONE CAPSULE BY MOUTH ONCE DAILY, Disp: 90 capsule, Rfl: 0    diphenhydrAMINE (SOMINEX) 25 mg tablet, Take 25 mg by mouth nightly as needed for Insomnia. Pt takes 1 1/2 tabs for sleep at night, Disp: , Rfl:     gabapentin (NEURONTIN) 100 MG capsule, Take 400 mg by mouth 2 (two) times daily. 2 tab PO (200 mg) BID daily, Disp: , Rfl:     isosorbide mononitrate (IMDUR) 30 MG 24 hr tablet, Take 1 tablet (30 mg total) by mouth once daily., Disp: 90 tablet, Rfl: 3    lancets (LANCETS,ULTRA THIN) Misc, Use daily to check glucose. 99m need. f2f 6/7/22. E11.22, Disp: 100 each, Rfl: 3    levothyroxine (SYNTHROID) 25 MCG tablet, TAKE ONE TABLET BY MOUTH DAILY BEFORE BREAKFAST, Disp: 90 tablet, Rfl: 0    lisinopriL (PRINIVIL,ZESTRIL) 5 MG tablet, Take 0.5 tablets (2.5 mg total) by mouth daily as needed (If SBP >140)., Disp: 45 tablet, Rfl: 3    metFORMIN (GLUCOPHAGE-XR) 500 MG ER 24hr tablet, TAKE ONE TABLET BY MOUTH EVERY MORNING, 1 TABLET AT NOON, and 2 TABLETS AT BEDTIME, Disp: 360 tablet, Rfl: 0    MS CONTIN 30 mg 12 hr tablet, Take 30 mg by mouth 2 (two) times daily as needed., Disp: , Rfl:     omeprazole (PRILOSEC) 40 MG capsule, Take 1 capsule (40 mg total) by mouth Daily., Disp: 90 capsule, Rfl: 3    oxyCODONE (ROXICODONE) 20 mg Tab immediate release tablet, Take 1 tablet by mouth 4 (four) times daily as needed., Disp: , Rfl:     rosuvastatin (CRESTOR) 10 MG tablet, Take 1 tablet (10 mg total) by mouth once daily., Disp: 90 tablet, Rfl: 3    tamsulosin (FLOMAX) 0.4 mg Cap, Take 1  capsule by mouth., Disp: , Rfl:     vitamin D (VITAMIN D3) 1000 units Tab, Take 1,000 Units by mouth once daily., Disp: , Rfl:           Objective:   Vitals:  Blood pressure 130/80, pulse 67, temperature 98 °F (36.7 °C), temperature source Temporal, resp. rate 18, height 6' (1.829 m), weight 104.7 kg (230 lb 14.4 oz), SpO2 97%.    Physical Examination:   GEN: no apparent distress, comfortable  HEAD: atraumatic and normocephalic  EYES: + pallor, no icterus  ENT: no pharyngeal erythema, external ears WNL; no nasal discharge  NECK: no masses, thyroid normal, trachea midline, no LAD/LN's, supple  CV: RRR with no murmur; normal pulse  CHEST: Normal respiratory effort; CTAB; normal breath sounds; no wheeze or crackles  ABDOM: nontender and nondistended; soft,  no rebound/guarding, L/S NP  MUSC/Skeletal: ROM normal; no crepitus; joints normal  EXTREM: no clubbing, cyanosis, inflammation or swelling  SKIN: no rashes, lesions, ulcers, petechiae  : no cvat  NEURO: grossly intact; motor/sensory WNL;  no tremors  PSYCH: normal mood, affect and behavior  LYMPH: normal cervical, supraclavicular, axillary LN's    Current lab shows HGB 12.4 and Ferritin 53.   B 12 437.    Assessment:   (1) 70 y.o. male with diagnosis of Fe deficiency anemia.      (2)Stool heme negative now.  EGD gastritis, Colonoscopy polyp removed.  8/29/23.    (3)S/P Injectafer x's 2 infusions.  Given.    (4)Hgb better at 12.4, Ferritin now at 58., EPO 12.8.  Observation is the plan, iron infusion is not needed at this time.    (5) morbid obesity situation.  Discussed with him again.  He continues on his weight loss efforts and has gone from 290 lb down to 230 lb.  He will continue to try to reduce his dietary intake to pursue further weight loss efforts.    RTC 6 months with a CBC, B12, ferritin.    Leroy Steward MD  [facility] On[facility].  5/20/25

## (undated) DEVICE — PACK CUSTOM UNIV BASIN SLI

## (undated) DEVICE — PIN PINABALL HEADLESS
Type: IMPLANTABLE DEVICE | Site: KNEE | Status: NON-FUNCTIONAL
Removed: 2017-06-27

## (undated) DEVICE — PADDING CAST SPECIALIST 6X4YD

## (undated) DEVICE — SEE MEDLINE ITEM 157131

## (undated) DEVICE — Device

## (undated) DEVICE — CATHETER DIAGNOSTIC DXTERITY 5FR JR4.0

## (undated) DEVICE — SPONGE LAP 18X18 PREWASHED

## (undated) DEVICE — KIT AUTO TRANSF 800ML RESVR

## (undated) DEVICE — HOOK CEMENT BECHTOL**CALL IN**

## (undated) DEVICE — SHEATH INTRODUCER SLENDER 6FX10CM

## (undated) DEVICE — PACK ARTHROSCOPY W/ISO BAC

## (undated) DEVICE — SCRUB HIBICLENS 4% CHG 4OZ

## (undated) DEVICE — GOWN X-LG STERILE BACK

## (undated) DEVICE — DRAPE XRAY CASSTTE 20X25

## (undated) DEVICE — SEE MEDLINE ITEM 152622

## (undated) DEVICE — TRAY DRY SPONGE SCRUB W FOAM

## (undated) DEVICE — NDL ECLIPSE SAFETY 18GX1-1/2IN

## (undated) DEVICE — BRUSH SCRUB HIBICLENS 4%

## (undated) DEVICE — SEE MEDLINE ITEM 152530

## (undated) DEVICE — MIXER BONE CEMENT

## (undated) DEVICE — PAD ABD 8X10 STERILE

## (undated) DEVICE — ELECTRODE REM PLYHSV RETURN 9

## (undated) DEVICE — BLADE SAW SGTL DL STR 25X1.27X

## (undated) DEVICE — GUIDEWIRE DOUBLE ENDED .035 DIA. 150CML

## (undated) DEVICE — ALCOHOL 70% ISOP RUBBING 4OZ

## (undated) DEVICE — SEE MEDLINE ITEM 153151

## (undated) DEVICE — SLEEVE SCD EXPRESS CALF MEDIUM

## (undated) DEVICE — HEMOSTAT VASC BAND REG 24CM

## (undated) DEVICE — SEE MEDLINE ITEM 157166

## (undated) DEVICE — GLOVE SURG ULTRA TOUCH 7.5

## (undated) DEVICE — TAPE SILK 3IN

## (undated) DEVICE — PACK BASIC

## (undated) DEVICE — SUT VCRL 2-0 GYN 8-18IN MO4

## (undated) DEVICE — COVER SURG LIGHT HANDLE

## (undated) DEVICE — TOURNIQUET SB QC DP 34X4IN

## (undated) DEVICE — SYR 30CC LUER LOCK

## (undated) DEVICE — KIT EVACUATOR FLAT DRAIN 100CC

## (undated) DEVICE — SUT #2 TI-CRON HGS-21 30IN

## (undated) DEVICE — COLD THER SYS W/PAD UNV RH

## (undated) DEVICE — CATHETER RADIAL TIG 4.0 OPTITORQUE 5X110

## (undated) DEVICE — SUT MONOCRYL 3-0 PS-1

## (undated) DEVICE — SOL IRR NACL .9% 3000ML

## (undated) DEVICE — DRAPE INCISE IOBAN 2 23X17IN

## (undated) DEVICE — DRESSING N ADH OIL EMUL 3X8 3S

## (undated) DEVICE — DRAIN SINGLE ROUND 3/16 15F

## (undated) DEVICE — GAUZE SPONGE BULKEE 6X6.75IN

## (undated) DEVICE — GLOVE SURG ULTRA TOUCH 8

## (undated) DEVICE — UNDERGLOVES BIOGEL PI SIZE 8

## (undated) DEVICE — SHEET DRAPE MEDIUM

## (undated) DEVICE — SEE MEDLINE ITEM 146271

## (undated) DEVICE — SOL 9P NACL IRR PIC IL

## (undated) DEVICE — CLOSURE SKIN STERI STRIP 1/2X4

## (undated) DEVICE — SUT VICRYL 1 MO-4 18 CR/8

## (undated) DEVICE — LINER SUCTION 3000CC

## (undated) DEVICE — BLADE ELECTRO EXTENDED.

## (undated) DEVICE — BANDAGE ESMARK 6X12

## (undated) DEVICE — COOLER ICEMAN COLD THERAPY